# Patient Record
Sex: MALE | Race: WHITE | NOT HISPANIC OR LATINO | Employment: OTHER | URBAN - METROPOLITAN AREA
[De-identification: names, ages, dates, MRNs, and addresses within clinical notes are randomized per-mention and may not be internally consistent; named-entity substitution may affect disease eponyms.]

---

## 2017-01-11 ENCOUNTER — OFFICE VISIT (OUTPATIENT)
Dept: FAMILY MEDICINE | Facility: CLINIC | Age: 67
End: 2017-01-11
Payer: COMMERCIAL

## 2017-01-11 VITALS
BODY MASS INDEX: 33.03 KG/M2 | OXYGEN SATURATION: 95 % | HEIGHT: 73 IN | WEIGHT: 249.2 LBS | SYSTOLIC BLOOD PRESSURE: 128 MMHG | DIASTOLIC BLOOD PRESSURE: 78 MMHG | TEMPERATURE: 97.1 F | HEART RATE: 65 BPM

## 2017-01-11 DIAGNOSIS — I10 BENIGN ESSENTIAL HYPERTENSION: Primary | ICD-10-CM

## 2017-01-11 DIAGNOSIS — G62.9 PERIPHERAL POLYNEUROPATHY: ICD-10-CM

## 2017-01-11 DIAGNOSIS — D17.30 LIPOMA OF SKIN AND SUBCUTANEOUS TISSUE: ICD-10-CM

## 2017-01-11 DIAGNOSIS — M25.531 RIGHT WRIST PAIN: ICD-10-CM

## 2017-01-11 DIAGNOSIS — E78.5 HYPERLIPIDEMIA LDL GOAL <130: ICD-10-CM

## 2017-01-11 DIAGNOSIS — C02.9 SQUAMOUS CELL CARCINOMA OF TONGUE (H): ICD-10-CM

## 2017-01-11 DIAGNOSIS — Z12.5 SCREENING FOR PROSTATE CANCER: ICD-10-CM

## 2017-01-11 LAB
ALBUMIN SERPL-MCNC: 4.3 G/DL (ref 3.4–5)
ALP SERPL-CCNC: 75 U/L (ref 40–150)
ALT SERPL W P-5'-P-CCNC: 53 U/L (ref 0–70)
ANION GAP SERPL CALCULATED.3IONS-SCNC: 5 MMOL/L (ref 3–14)
AST SERPL W P-5'-P-CCNC: 24 U/L (ref 0–45)
BILIRUB SERPL-MCNC: 1.1 MG/DL (ref 0.2–1.3)
BUN SERPL-MCNC: 11 MG/DL (ref 7–30)
CALCIUM SERPL-MCNC: 9.1 MG/DL (ref 8.5–10.1)
CHLORIDE SERPL-SCNC: 105 MMOL/L (ref 94–109)
CHOLEST SERPL-MCNC: 157 MG/DL
CO2 SERPL-SCNC: 28 MMOL/L (ref 20–32)
CREAT SERPL-MCNC: 0.75 MG/DL (ref 0.66–1.25)
ERYTHROCYTE [DISTWIDTH] IN BLOOD BY AUTOMATED COUNT: 13.4 % (ref 10–15)
GFR SERPL CREATININE-BSD FRML MDRD: ABNORMAL ML/MIN/1.7M2
GLUCOSE SERPL-MCNC: 104 MG/DL (ref 70–99)
HCT VFR BLD AUTO: 49.5 % (ref 40–53)
HDLC SERPL-MCNC: 41 MG/DL
HGB BLD-MCNC: 17.3 G/DL (ref 13.3–17.7)
LDLC SERPL CALC-MCNC: 74 MG/DL
MCH RBC QN AUTO: 29.4 PG (ref 26.5–33)
MCHC RBC AUTO-ENTMCNC: 34.9 G/DL (ref 31.5–36.5)
MCV RBC AUTO: 84 FL (ref 78–100)
NONHDLC SERPL-MCNC: 116 MG/DL
PLATELET # BLD AUTO: 219 10E9/L (ref 150–450)
POTASSIUM SERPL-SCNC: 3.8 MMOL/L (ref 3.4–5.3)
PROT SERPL-MCNC: 7 G/DL (ref 6.8–8.8)
PSA SERPL-ACNC: 1.82 UG/L (ref 0–4)
RBC # BLD AUTO: 5.89 10E12/L (ref 4.4–5.9)
SODIUM SERPL-SCNC: 138 MMOL/L (ref 133–144)
TRIGL SERPL-MCNC: 208 MG/DL
WBC # BLD AUTO: 5.6 10E9/L (ref 4–11)

## 2017-01-11 PROCEDURE — 36415 COLL VENOUS BLD VENIPUNCTURE: CPT | Performed by: INTERNAL MEDICINE

## 2017-01-11 PROCEDURE — 85027 COMPLETE CBC AUTOMATED: CPT | Performed by: INTERNAL MEDICINE

## 2017-01-11 PROCEDURE — 80053 COMPREHEN METABOLIC PANEL: CPT | Performed by: INTERNAL MEDICINE

## 2017-01-11 PROCEDURE — 80061 LIPID PANEL: CPT | Performed by: INTERNAL MEDICINE

## 2017-01-11 PROCEDURE — G0103 PSA SCREENING: HCPCS | Performed by: INTERNAL MEDICINE

## 2017-01-11 PROCEDURE — 99203 OFFICE O/P NEW LOW 30 MIN: CPT | Performed by: INTERNAL MEDICINE

## 2017-01-11 RX ORDER — HYDROCHLOROTHIAZIDE 25 MG/1
25 TABLET ORAL PRN
COMMUNITY
End: 2017-02-09

## 2017-01-11 NOTE — MR AVS SNAPSHOT
After Visit Summary   1/11/2017    Chrystal Solis    MRN: [id number]           Patient Information     Date Of Birth          1950        Visit Information        Provider Department      1/11/2017 8:30 AM Lam Rodríguez MD [facility]        Today's Diagnoses     Benign essential hypertension    -  1     Hyperlipidemia LDL goal <130         Squamous cell carcinoma of tongue (H)         Lipoma of skin and subcutaneous tissue         Right wrist pain         Peripheral polyneuropathy (H)         Screening for prostate cancer            Follow-ups after your visit        Additional Services     GENERAL SURG ADULT REFERRAL       Your provider has referred you to: FM[facility]: [facility] [place] [phone]   [email or web address]    Dr. Jaswinder Whitten    Please be aware that coverage of these services is subject to the terms and limitations of your health insurance plan.  Call member services at your health plan with any benefit or coverage questions.      Please bring the following with you to your appointment:    (1) Any X-Rays, CTs or MRIs which have been performed.  Contact the facility where they were done to arrange for  prior to your scheduled appointment.   (2) List of current medications   (3) This referral request   (4) Any documents/labs given to you for this referral            ORTHOPEDICS ADULT REFERRAL       Your provider has referred you to: [facility] [place] [phone]   [email or web address] (Hand specialist)    Please be aware that coverage of these services is subject to the terms and limitations of your health insurance plan.  Call member services at your health plan with any benefit or coverage questions.      Please bring the following to your appointment:    >>   Any x-rays, CTs or MRIs which have been performed.  Contact the facility where they were done to  "arrange for  prior to your scheduled appointment.    >>   List of current medications   >>   This referral request   >>   Any documents/labs given to you for this referral                  Who to contact     If you have questions or need follow up information about today's clinic visit or your schedule please contact [facility] directly at [phone].  Normal or non-critical lab and imaging results will be communicated to you by MyChart, letter or phone within 4 business days after the clinic has received the results. If you do not hear from us within 7 days, please contact the clinic through [employer] or phone. If you have a critical or abnormal lab result, we will notify you by phone as soon as possible.  Submit refill requests through [employer] or call your pharmacy and they will forward the refill request to us. Please allow 3 business days for your refill to be completed.          Additional Information About Your Visit        [employer] Information     [employer] lets you send messages to your doctor, view your test results, renew your prescriptions, schedule appointments and more. To sign up, go to [email or web address] . Click on \"Log in\" on the left side of the screen, which will take you to the Welcome page. Then click on \"Sign up Now\" on the right side of the page.     You will be asked to enter the access code listed below, as well as some personal information. Please follow the directions to create your username and password.     Your access code is: [email or web address]  Expires: 2017  9:51 AM     Your access code will  in 90 days. If you need help or a new code, please call your [place] clinic or [phone].        Care EveryWhere ID     This is your Care EveryWhere ID. This could be used by other organizations to access your [place] medical records  [id number]        Your Vitals Were     Pulse Temperature Height BMI (Body Mass Index) Pulse Oximetry       65 97.1  F (36.2  C) " "(Oral) 6' 0.75\" (1.848 m) 33.10 kg/m2 95%        Blood Pressure from Last 3 Encounters:   01/11/17 128/78   01/31/14 132/79   03/06/13 132/80    Weight from Last 3 Encounters:   01/11/17 249 lb 3.2 oz (113.036 kg)   01/31/14 234 lb 9.6 oz (106.414 kg)   03/06/13 222 lb (100.699 kg)              We Performed the Following     CBC with platelets     Comprehensive metabolic panel     GENERAL SURG ADULT REFERRAL     Lipid Profile with reflex to direct LDL     ORTHOPEDICS ADULT REFERRAL     PSA, screen        Primary Care Provider Office Phone # Fax #    Moshe Thomas [phone]       [facility] INTERNAL [facility] [address] [address]        Thank you!     Thank you for choosing [facility]  for your care. Our goal is always to provide you with excellent care. Hearing back from our patients is one way we can continue to improve our services. Please take a few minutes to complete the written survey that you may receive in the mail after your visit with us. Thank you!             Your Updated Medication List - Protect others around you: Learn how to safely use, store and throw away your medicines at www.disposemymeds.org.          This list is accurate as of: 1/11/17  9:51 AM.  Always use your most recent med list.                   Brand Name Dispense Instructions for use    AVAPRO 150 MG tablet   Generic drug:  irbesartan      Take 300 mg by mouth daily.       hydrochlorothiazide 25 MG tablet    HYDRODIURIL     Take 25 mg by mouth as needed       LIPITOR 10 MG tablet   Generic drug:  atorvastatin      Take 10 mg by mouth daily.       NORVASC 10 MG tablet   Generic drug:  amLODIPine      Take 10 mg by mouth daily.         "

## 2017-01-11 NOTE — PROGRESS NOTES
"  SUBJECTIVE:                                                    Chrystal Solis is a 66 year old male who presents to clinic today for the following health issues:      New Patient/Transfer of Care    Right wrist pain x 2 months    Hyperlipidemia Follow-Up      Rate your low fat/cholesterol diet?: not monitoring fat    Taking statin?  Yes, no muscle aches from statin    Other lipid medications/supplements?:  none     Hypertension Follow-up      Outpatient blood pressures are not being checked.    Low Salt Diet: not monitoring salt       Amount of exercise or physical activity: No exercise outside of work; physical job    Problems taking medications regularly: No    Medication side effects: none  Diet: regular (no restrictions)      Musculoskeletal problem/pain      Duration: 2 years ago fall at work (8/31/2015); since then intermittent pain, severe over last few months    Description  Location: Right wrist     Intensity:  severe    Accompanying signs and symptoms: none    History  Previous similar problem: None   Previous evaluation:  x-ray YES- x-ray in 2015 showed no fracture according to patient    Precipitating or alleviating factors:  Trauma or overuse: YES  Aggravating factors include: some pain at rest, severely exacerbated by griping     Therapies tried and outcome: ice and prednisone (prednisone helped temporarily)      Subcutaneous mass on left shoulder present for years that gets sore when he bumps.  Biopsy showed \"chunk of lard\" \"fatty tissue\" ? Lipoma           Problem list and histories reviewed & adjusted, as indicated.  Additional history: as documented    Patient Active Problem List   Diagnosis     Squamous cell carcinoma of tongue (H)     Benign essential hypertension     Hyperlipidemia LDL goal <130     Peripheral polyneuropathy (H)     Past Surgical History   Procedure Laterality Date     Orthopedic surgery  2002     torn ligament right shoulder     Hernia repair Left 2002     Soft tissue " "surgery  12/6/10     excisional biopsy of tongue lesion     Tonsillectomy  Age 6     Colonoscopy  3/2009     Ent surgery       Head & neck surgery       Biopsy         Social History   Substance Use Topics     Smoking status: Former Smoker -- 0.25 packs/day for 2 years     Types: Cigarettes     Quit date: 01/01/1978     Smokeless tobacco: Never Used      Comment: smoked socially in college     Alcohol Use: 0.0 oz/week     0 Standard drinks or equivalent per week      Comment: 4-5 drinks a week     Family History   Problem Relation Age of Onset     CEREBROVASCULAR DISEASE Mother      Hypertension Mother      Respiratory Father      heavy smoker     CANCER Maternal Grandfather      lip cancer--heavy smoker     Unknown/Adopted Son      Unknown/Adopted Daughter          Current Outpatient Prescriptions   Medication Sig Dispense Refill     hydrochlorothiazide (HYDRODIURIL) 25 MG tablet Take 25 mg by mouth as needed       amLODIPine (NORVASC) 10 MG tablet Take 10 mg by mouth daily.       irbesartan (AVAPRO) 150 MG tablet Take 300 mg by mouth daily.        atorvastatin (LIPITOR) 10 MG tablet Take 10 mg by mouth daily.       Allergies   Allergen Reactions     No Known Allergies        ROS:  Neuropathy numbness in bilateral feet relates to chemo    10 ROS otherwise negative    OBJECTIVE:                                                    /78 mmHg  Pulse 65  Temp(Src) 97.1  F (36.2  C) (Oral)  Ht 6' 0.75\" (1.848 m)  Wt 249 lb 3.2 oz (113.036 kg)  BMI 33.10 kg/m2  SpO2 95%  Body mass index is 33.1 kg/(m^2).   GENERAL: healthy, alert and no distress  EYES: Eyes grossly normal to inspection, PERRL and conjunctivae and sclerae normal  HENT: ear canals and TM's normal, nose and mouth without ulcers or lesions  NECK: fibrotic tissue in neck without masses or adenopathy  RESP: lungs clear to auscultation - no rales, rhonchi or wheezes  CV: regular rate and rhythm, normal S1 S2, no S3 or S4, no murmur, click or rub, no " peripheral edema and peripheral pulses strong  ABDOMEN: soft, nontender, no hepatosplenomegaly, no masses and bowel sounds normal  MS: no gross musculoskeletal defects noted, no edema; pain with palpation of base of right thumb, also pain with flexion extension of wrist  SKIN: no suspicious lesions or rashes; subcutaneous mass on left shoulder, mobile, not tender, 5cm X5cm   NEURO: Normal strength and tone, mentation intact and speech normal  PSYCH: mentation appears normal, affect normal/bright    Diagnostic Test Results:  none      ASSESSMENT:                                                        PLAN:                                                    1. Benign essential hypertension  Second check demonstrates good control   Check labs      2. Hyperlipidemia LDL goal <130  On statin therapy   He wants his lipids checked    3. Squamous cell carcinoma of tongue (H)  Continue follow up with [facility]     4. Lipoma of skin and subcutaneous tissue  He should see Dr. Lopez to discuss removing this large lipoma  - GENERAL SURG ADULT REFERRAL    5. Right wrist pain  Complicated presentation with history of remote trauma, some of his symptoms are likely from base of thumb OA, but wrist symptoms are also concerning, I think he should have a hand surgery consult   - ORTHOPEDICS ADULT REFERRAL    6. Peripheral polyneuropathy (H)  Stable symptoms         FUTURE APPOINTMENTS:       - Follow-up visit in pending labs and symptoms     Lam Rodríguez MD  [facility]      Total time > 28 minutes mostly coordinating care/ counseled on diet and exercise

## 2017-01-11 NOTE — PROGRESS NOTES
"Quick Note:    The following letter pertains to your most recent diagnostic tests:    -Liver and gallbladder tests are normal for you. (ALT,AST, Alk phos, bilirubin), kidney function is normal for you (Creatinine, GFR), Sodium is normal, Potassium is normal for you, Calcium is normal for you, Glucose (blood sugar) is near enough to normal for you.     -Your total cholesterol is 157 which is at your goal of total cholesterol less than 200.    -Your triglycerides are 208 which are above your goal of triglycerides less than 150.    -Your HDL or \"good cholesterol\" is 41 which is at your goal of HDL cholesterol greater than 40.    -Your LDL cholesterol or \"bad cholesterol\" is 74 which is at your goal of LDL cholesterol less than <100. Your LDL goal is based on your risk factors for artery disease.        Bottom line: Your cholesterol could use some attention to your diet. Reducing carbohydrates and fats in your diet, losing weight and consuming less alcohol can improve your triglyceride levels. This can be rechecked in one year. The rest of your labs look OK.       Follow up: Schedule an appointment for a physical examination with fasting blood tests in one year's time, or return sooner if new questions, symptoms or problems arise.        Sincerely,    Dr. Rodríguez  ______  "

## 2017-01-11 NOTE — NURSING NOTE
"Chief Complaint   Patient presents with     Establish Care       Initial /91 mmHg  Pulse 65  Temp(Src) 97.1  F (36.2  C) (Oral)  Ht 6' 0.75\" (1.848 m)  Wt 249 lb 3.2 oz (113.036 kg)  BMI 33.10 kg/m2  SpO2 95% Estimated body mass index is 33.1 kg/(m^2) as calculated from the following:    Height as of this encounter: 6' 0.75\" (1.848 m).    Weight as of this encounter: 249 lb 3.2 oz (113.036 kg).  BP completed using cuff size: large, right arm  Nuvia Sunshine MA  "

## 2017-01-11 NOTE — Clinical Note
"[facility]  [address]  [place] [address]  [phone]                                                                                               Date: 1/12/2017    Chrystal RENEE Will                                                                               [address] [place] [address]              The following letter pertains to your most recent diagnostic tests:    -Liver and gallbladder tests are normal for you. (ALT,AST, Alk phos, bilirubin), kidney function is normal for you (Creatinine, GFR), Sodium is normal, Potassium is normal for you, Calcium is normal for you, Glucose (blood sugar) is near enough to normal for you.      -Your total cholesterol is 157 which is at your goal of total cholesterol less than 200.    -Your triglycerides are 208 which are above your goal of triglycerides less than 150.    -Your HDL or \"good cholesterol\" is 41 which is at your goal of HDL cholesterol greater than 40.    -Your LDL cholesterol or \"bad cholesterol\" is 74 which is at your goal of LDL cholesterol less than <100.  Your LDL goal is based on your risk factors for artery disease.         Bottom line:  Your cholesterol could use some attention to your diet.  Reducing carbohydrates and fats in your diet, losing weight and consuming less alcohol can improve your triglyceride levels. This can be rechecked in one year.  The rest of your labs look OK.        Follow up:  Schedule an appointment for a physical examination with fasting blood tests in one year's time, or return sooner if new questions, symptoms or problems arise.         Sincerely,    Dr. Rodríguez/Melia VAZQUEZ CMA    Results for orders placed or performed in visit on 01/11/17   Comprehensive metabolic panel   Result Value Ref Range    Sodium 138 133 - 144 mmol/L    Potassium 3.8 3.4 - 5.3 mmol/L    Chloride 105 94 - 109 mmol/L    Carbon Dioxide 28 20 - 32 mmol/L    Anion Gap 5 3 - 14 mmol/L    Glucose 104 (H) 70 - 99 " mg/dL    Urea Nitrogen 11 7 - 30 mg/dL    Creatinine 0.75 0.66 - 1.25 mg/dL    GFR Estimate >90  Non  GFR Calc   >60 mL/min/1.7m2    GFR Estimate If Black >90   GFR Calc   >60 mL/min/1.7m2    Calcium 9.1 8.5 - 10.1 mg/dL    Bilirubin Total 1.1 0.2 - 1.3 mg/dL    Albumin 4.3 3.4 - 5.0 g/dL    Protein Total 7.0 6.8 - 8.8 g/dL    Alkaline Phosphatase 75 40 - 150 U/L    ALT 53 0 - 70 U/L    AST 24 0 - 45 U/L   Lipid Profile with reflex to direct LDL   Result Value Ref Range    Cholesterol 157 <200 mg/dL    Triglycerides 208 (H) <150 mg/dL    HDL Cholesterol 41 >39 mg/dL    LDL Cholesterol Calculated 74 <100 mg/dL    Non HDL Cholesterol 116 <130 mg/dL   CBC with platelets   Result Value Ref Range    WBC 5.6 4.0 - 11.0 10e9/L    RBC Count 5.89 4.4 - 5.9 10e12/L    Hemoglobin 17.3 13.3 - 17.7 g/dL    Hematocrit 49.5 40.0 - 53.0 %    MCV 84 78 - 100 fl    MCH 29.4 26.5 - 33.0 pg    MCHC 34.9 31.5 - 36.5 g/dL    RDW 13.4 10.0 - 15.0 %    Platelet Count 219 150 - 450 10e9/L   PSA, screen   Result Value Ref Range    PSA 1.82 0 - 4 ug/L             Sincerely,    {[facility] PROVIDER LIST:[id number]}

## 2017-01-24 ENCOUNTER — OFFICE VISIT (OUTPATIENT)
Dept: SURGERY | Facility: CLINIC | Age: 67
End: 2017-01-24
Payer: COMMERCIAL

## 2017-01-24 VITALS
DIASTOLIC BLOOD PRESSURE: 84 MMHG | HEART RATE: 77 BPM | BODY MASS INDEX: 33.18 KG/M2 | WEIGHT: 245 LBS | HEIGHT: 72 IN | SYSTOLIC BLOOD PRESSURE: 140 MMHG

## 2017-01-24 DIAGNOSIS — D17.30 LIPOMA OF SKIN AND SUBCUTANEOUS TISSUE: Primary | ICD-10-CM

## 2017-01-24 PROCEDURE — 99243 OFF/OP CNSLTJ NEW/EST LOW 30: CPT | Performed by: SURGERY

## 2017-01-24 NOTE — Clinical Note
Sur[facility], [facility]    January 24, 2017         Jaswinder Lopez MD    Chrystal Solis MRN# [id number]   YOB: 1950 Age: 66 year old     PCP:  Lam Rodríguez [phone]    Chief Complaint:  Subcutaneous mass on the back and left arm    Pt was seen in consultation from Lam Rodríguez.    History of Present Illness:  Chrystal Solis is a 66 year old male who presented with the prominent subcutaneous mass on the arm. This was just below the deltoid and measures approximately 5-6 cm in greatest diameter. This is occasionally tender when he strikes that he feels is getting larger. Patient also has a mass on the right shoulder below the scapula. He is interested in surgical removal.    PMH:  Chrystal Solis  has a past medical history of Hypertension; Bronchitis; Malignant neoplasm (H) (12/6/10); Hearing loss, mixed, bilateral; Intervertebral cervical disc disorder with myelopathy, cervical region (2006); Benign essential hypertension (1/11/2017); Squamous cell carcinoma of tongue (H) (12/14/2010); Hyperlipidemia LDL goal <130 (1/11/2017); and Peripheral polyneuropathy (H) (1/11/2017).  PSH:  Chrystal Solis  has past surgical history that includes orthopedic surgery (2002); hernia repair (Left, 2002); Soft tissue surgery (12/6/10); tonsillectomy (Age 6); colonoscopy (3/2009); ENT surgery; Head and neck surgery; and biopsy.    Home medications and allergies reviewed.    Social History:  Chrystal Solis  reports that he quit smoking about 39 years ago. His smoking use included Cigarettes. He has a .5 pack-year smoking history. He has never used smokeless tobacco. He reports that he drinks alcohol. He reports that he does not use illicit drugs.  Family History:  Chrystal Solis family history includes CANCER in his maternal grandfather; CEREBROVASCULAR DISEASE in his mother; Hypertension in his mother; Respiratory in his father; Unknown/Adopted in his daughter and  son.    ROS:  The 10 point Review of Systems is negative other than noted in the HPI.    Physical Exam:  Blood pressure 140/84, pulse 77, height 6' (1.829 m), weight 245 lb (111.131 kg).  245 lbs 0 oz  Healthy-appearing gentleman in no distress.  Patient has a pleasant affect and communicates well.   Pupils equal round and reactive to light.   No cervical lymphadenopathy or thyromegaly.   Lung fields clear, breathing comfortably.   Heart normal sinus rhythm.  No murmurs rubs or gallops.  Soft somewhat firm fleshy lesion below the deltoid on the left. Not particularly mobile. Also a 4-5 cm area of fullness along the right scapula.  Skin warm, dry.  No obvious rashes or lesions.     Assessment/plan:  Pleasant 66-year-old gentleman with two prominent lipomas. He seems very thick skinned and these lipomas on the back are challenging to remove. I recommended surgical excision of both of these lesions under either heavy sedation or general anesthesia. This will insure that we remove the entire lesion and make for a less painful surgery. He was interested in getting this taken care of sometime in the near future.    Surgical co-morbities include hypertension, hypercholesterolemia, previous malignancy.    Naveen Lopez M.D.  [facility]  [phone]

## 2017-01-24 NOTE — MR AVS SNAPSHOT
"              After Visit Summary   1/24/2017    Chrystal Solis    MRN: [id number]           Patient Information     Date Of Birth          1950        Visit Information        Provider Department      1/24/2017 3:00 PM Jaswinder Lopez MD [facility] General Surgery       Follow-ups after your visit        Your next 10 appointments already scheduled     Feb 09, 2017  9:00 AM   PHYSICAL with Lam Rodrí[facility]uez MD   [facility]    [address] [facility] [address] [phone]              Who to contact     If you have questions or need follow up information about today's clinic visit or your schedule please contact [facility] directly at [phone].  Normal or non-critical lab and imaging results will be communicated to you by MyChart, letter or phone within 4 business days after the clinic has received the results. If you do not hear from us within 7 days, please contact the clinic through MyChart or phone. If you have a critical or abnormal lab result, we will notify you by phone as soon as possible.  Submit refill requests through [employer] or call your pharmacy and they will forward the refill request to us. Please allow 3 business days for your refill to be completed.          Additional Information About Your Visit        MyChart Information     [employer] lets you send messages to your doctor, view your test results, renew your prescriptions, schedule appointments and more. To sign up, go to [email or web address] . Click on \"Log in\" on the left side of the screen, which will take you to the Welcome page. Then click on \"Sign up Now\" on the right side of the page.     You will be asked to enter the access code listed below, as well as some personal information. Please follow the directions to create your username and password.     Your access code is: [email or web address]  Expires: 4/11/2017  9:51 " AM     Your access code will  in 90 days. If you need help or a new code, please call your [place] clinic or [phone].        Care EveryWhere ID     This is your Care EveryWhere ID. This could be used by other organizations to access your [place] medical records  [id number]        Your Vitals Were     Pulse Height BMI (Body Mass Index)             77 6' (1.829 m) 33.22 kg/m2          Blood Pressure from Last 3 Encounters:   17 166/99   17 128/78   14 132/79    Weight from Last 3 Encounters:   17 245 lb (111.131 kg)   17 249 lb 3.2 oz (113.036 kg)   14 234 lb 9.6 oz (106.414 kg)              Today, you had the following     No orders found for display       Primary Care Provider Office Phone # Fax #    Lam Rodríguez [phone]       [facility] [address] [address]        Thank you!     Thank you for choosing [facility]  for your care. Our goal is always to provide you with excellent care. Hearing back from our patients is one way we can continue to improve our services. Please take a few minutes to complete the written survey that you may receive in the mail after your visit with us. Thank you!             Your Updated Medication List - Protect others around you: Learn how to safely use, store and throw away your medicines at www.disposemymeds.org.          This list is accurate as of: 17  3:12 PM.  Always use your most recent med list.                   Brand Name Dispense Instructions for use    AVAPRO 150 MG tablet   Generic drug:  irbesartan      Take 300 mg by mouth daily.       hydrochlorothiazide 25 MG tablet    HYDRODIURIL     Take 25 mg by mouth as needed       LIPITOR 10 MG tablet   Generic drug:  atorvastatin      Take 10 mg by mouth daily.       NORVASC 10 MG tablet   Generic drug:  amLODIPine      Take 10 mg by mouth daily.

## 2017-01-25 NOTE — PROGRESS NOTES
Surgery Consultation, [facility], KRISHAN Lopez MD    Chrystal Solis MRN# [id number]   YOB: 1950 Age: 66 year old     PCP:  Lam Rodríguez [phone]    Chief Complaint:  Subcutaneous mass on the back and left arm    Pt was seen in consultation from Lam Rodríguez.    History of Present Illness:  Chrystal Solis is a 66 year old male who presented with the prominent subcutaneous mass on the arm. This was just below the deltoid and measures approximately 5-6 cm in greatest diameter. This is occasionally tender when he strikes that he feels is getting larger. Patient also has a mass on the right shoulder below the scapula. He is interested in surgical removal.    PMH:  Chrystal Solis  has a past medical history of Hypertension; Bronchitis; Malignant neoplasm (H) (12/6/10); Hearing loss, mixed, bilateral; Intervertebral cervical disc disorder with myelopathy, cervical region (2006); Benign essential hypertension (1/11/2017); Squamous cell carcinoma of tongue (H) (12/14/2010); Hyperlipidemia LDL goal <130 (1/11/2017); and Peripheral polyneuropathy (H) (1/11/2017).  PSH:  Chrystal Solis  has past surgical history that includes orthopedic surgery (2002); hernia repair (Left, 2002); Soft tissue surgery (12/6/10); tonsillectomy (Age 6); colonoscopy (3/2009); ENT surgery; Head and neck surgery; and biopsy.    Home medications and allergies reviewed.    Social History:  Chrystal oSlis  reports that he quit smoking about 39 years ago. His smoking use included Cigarettes. He has a .5 pack-year smoking history. He has never used smokeless tobacco. He reports that he drinks alcohol. He reports that he does not use illicit drugs.  Family History:  Chrystal Solis family history includes CANCER in his maternal grandfather; CEREBROVASCULAR DISEASE in his mother; Hypertension in his mother; Respiratory in his father; Unknown/Adopted in his daughter and son.    ROS:  The 10  point Review of Systems is negative other than noted in the HPI.    Physical Exam:  Blood pressure 140/84, pulse 77, height 6' (1.829 m), weight 245 lb (111.131 kg).  245 lbs 0 oz  Healthy-appearing gentleman in no distress.  Patient has a pleasant affect and communicates well.   Pupils equal round and reactive to light.   No cervical lymphadenopathy or thyromegaly.   Lung fields clear, breathing comfortably.   Heart normal sinus rhythm.  No murmurs rubs or gallops.  Soft somewhat firm fleshy lesion below the deltoid on the left. Not particularly mobile. Also a 4-5 cm area of fullness along the right scapula.  Skin warm, dry.  No obvious rashes or lesions.     Assessment/plan:  Pleasant 66-year-old gentleman with two prominent lipomas. He seems very thick skinned and these lipomas on the back are challenging to remove. I recommended surgical excision of both of these lesions under either heavy sedation or general anesthesia. This will insure that we remove the entire lesion and make for a less painful surgery. He was interested in getting this taken care of sometime in the near future.    Surgical co-morbities include hypertension, hypercholesterolemia, previous malignancy.    Naveen Lopez M.D.  [facility]  [phone]    Please route or send letter to:  Primary Care Provider (PCP) and Referring Provider    [facility]      ROS      Physical Exam

## 2017-01-27 ENCOUNTER — TELEPHONE (OUTPATIENT)
Dept: FAMILY MEDICINE | Facility: CLINIC | Age: 67
End: 2017-01-27

## 2017-01-27 NOTE — TELEPHONE ENCOUNTER
Patient states that he spoke with Estela [phone] she advised him that he does not need a Pre op for this minor surgery.     Marybeth Jackson MA

## 2017-01-27 NOTE — TELEPHONE ENCOUNTER
Reason for Call:  Other appointment    Detailed comments: Pt is scheduled for procedure on 2/3 is confused  If he needs a pre op since he was just seen on 1/11. Pt has   Been trying to get ah old of surgical scheduling and has been unsuccessful   Please call Pt back to let him know if Pre op is needed.    Phone Number Patient can be reached at: Home number on file [phone] (home)    Best Time: anytime    Can we leave a detailed message on this number? YES    Call taken on 1/27/2017 at 8:16 AM by Esha Catherine

## 2017-02-01 NOTE — H&P (VIEW-ONLY)
"  SUBJECTIVE:                                                    Chrystal Solis is a 66 year old male who presents to clinic today for the following health issues:      New Patient/Transfer of Care    Right wrist pain x 2 months    Hyperlipidemia Follow-Up      Rate your low fat/cholesterol diet?: not monitoring fat    Taking statin?  Yes, no muscle aches from statin    Other lipid medications/supplements?:  none     Hypertension Follow-up      Outpatient blood pressures are not being checked.    Low Salt Diet: not monitoring salt       Amount of exercise or physical activity: No exercise outside of work; physical job    Problems taking medications regularly: No    Medication side effects: none  Diet: regular (no restrictions)      Musculoskeletal problem/pain      Duration: 2 years ago fall at work (8/31/2015); since then intermittent pain, severe over last few months    Description  Location: Right wrist     Intensity:  severe    Accompanying signs and symptoms: none    History  Previous similar problem: None   Previous evaluation:  x-ray YES- x-ray in 2015 showed no fracture according to patient    Precipitating or alleviating factors:  Trauma or overuse: YES  Aggravating factors include: some pain at rest, severely exacerbated by griping     Therapies tried and outcome: ice and prednisone (prednisone helped temporarily)      Subcutaneous mass on left shoulder present for years that gets sore when he bumps.  Biopsy showed \"chunk of lard\" \"fatty tissue\" ? Lipoma           Problem list and histories reviewed & adjusted, as indicated.  Additional history: as documented    Patient Active Problem List   Diagnosis     Squamous cell carcinoma of tongue (H)     Benign essential hypertension     Hyperlipidemia LDL goal <130     Peripheral polyneuropathy (H)     Past Surgical History   Procedure Laterality Date     Orthopedic surgery  2002     torn ligament right shoulder     Hernia repair Left 2002     Soft tissue " "surgery  12/6/10     excisional biopsy of tongue lesion     Tonsillectomy  Age 6     Colonoscopy  3/2009     Ent surgery       Head & neck surgery       Biopsy         Social History   Substance Use Topics     Smoking status: Former Smoker -- 0.25 packs/day for 2 years     Types: Cigarettes     Quit date: 01/01/1978     Smokeless tobacco: Never Used      Comment: smoked socially in college     Alcohol Use: 0.0 oz/week     0 Standard drinks or equivalent per week      Comment: 4-5 drinks a week     Family History   Problem Relation Age of Onset     CEREBROVASCULAR DISEASE Mother      Hypertension Mother      Respiratory Father      heavy smoker     CANCER Maternal Grandfather      lip cancer--heavy smoker     Unknown/Adopted Son      Unknown/Adopted Daughter          Current Outpatient Prescriptions   Medication Sig Dispense Refill     hydrochlorothiazide (HYDRODIURIL) 25 MG tablet Take 25 mg by mouth as needed       amLODIPine (NORVASC) 10 MG tablet Take 10 mg by mouth daily.       irbesartan (AVAPRO) 150 MG tablet Take 300 mg by mouth daily.        atorvastatin (LIPITOR) 10 MG tablet Take 10 mg by mouth daily.       Allergies   Allergen Reactions     No Known Allergies        ROS:  Neuropathy numbness in bilateral feet relates to chemo    10 ROS otherwise negative    OBJECTIVE:                                                    /78 mmHg  Pulse 65  Temp(Src) 97.1  F (36.2  C) (Oral)  Ht 6' 0.75\" (1.848 m)  Wt 249 lb 3.2 oz (113.036 kg)  BMI 33.10 kg/m2  SpO2 95%  Body mass index is 33.1 kg/(m^2).   GENERAL: healthy, alert and no distress  EYES: Eyes grossly normal to inspection, PERRL and conjunctivae and sclerae normal  HENT: ear canals and TM's normal, nose and mouth without ulcers or lesions  NECK: fibrotic tissue in neck without masses or adenopathy  RESP: lungs clear to auscultation - no rales, rhonchi or wheezes  CV: regular rate and rhythm, normal S1 S2, no S3 or S4, no murmur, click or rub, no " peripheral edema and peripheral pulses strong  ABDOMEN: soft, nontender, no hepatosplenomegaly, no masses and bowel sounds normal  MS: no gross musculoskeletal defects noted, no edema; pain with palpation of base of right thumb, also pain with flexion extension of wrist  SKIN: no suspicious lesions or rashes; subcutaneous mass on left shoulder, mobile, not tender, 5cm X5cm   NEURO: Normal strength and tone, mentation intact and speech normal  PSYCH: mentation appears normal, affect normal/bright    Diagnostic Test Results:  none      ASSESSMENT:                                                        PLAN:                                                    1. Benign essential hypertension  Second check demonstrates good control   Check labs      2. Hyperlipidemia LDL goal <130  On statin therapy   He wants his lipids checked    3. Squamous cell carcinoma of tongue (H)  Continue follow up with [facility]     4. Lipoma of skin and subcutaneous tissue  He should see Dr. Lopez to discuss removing this large lipoma  - GENERAL SURG ADULT REFERRAL    5. Right wrist pain  Complicated presentation with history of remote trauma, some of his symptoms are likely from base of thumb OA, but wrist symptoms are also concerning, I think he should have a hand surgery consult   - ORTHOPEDICS ADULT REFERRAL    6. Peripheral polyneuropathy (H)  Stable symptoms         FUTURE APPOINTMENTS:       - Follow-up visit in pending labs and symptoms     Lam Rodríguez MD  [facility]      Total time > 28 minutes mostly coordinating care/ counseled on diet and exercise

## 2017-02-03 ENCOUNTER — APPOINTMENT (OUTPATIENT)
Dept: SURGERY | Facility: PHYSICIAN GROUP | Age: 67
End: 2017-02-03
Payer: COMMERCIAL

## 2017-02-03 ENCOUNTER — HOSPITAL ENCOUNTER (OUTPATIENT)
Facility: CLINIC | Age: 67
Discharge: HOME | End: 2017-02-03
Attending: SURGERY | Admitting: SURGERY
Payer: COMMERCIAL

## 2017-02-03 ENCOUNTER — SURGERY (OUTPATIENT)
Age: 67
End: 2017-02-03

## 2017-02-03 ENCOUNTER — ANESTHESIA EVENT (OUTPATIENT)
Dept: SURGERY | Facility: CLINIC | Age: 67
End: 2017-02-03
Payer: COMMERCIAL

## 2017-02-03 ENCOUNTER — ANESTHESIA (OUTPATIENT)
Dept: SURGERY | Facility: CLINIC | Age: 67
End: 2017-02-03
Payer: COMMERCIAL

## 2017-02-03 VITALS
SYSTOLIC BLOOD PRESSURE: 123 MMHG | RESPIRATION RATE: 16 BRPM | BODY MASS INDEX: 33.38 KG/M2 | HEIGHT: 72 IN | TEMPERATURE: 97.4 F | OXYGEN SATURATION: 95 % | WEIGHT: 246.4 LBS | DIASTOLIC BLOOD PRESSURE: 81 MMHG

## 2017-02-03 DIAGNOSIS — D17.30 LIPOMA OF SKIN AND SUBCUTANEOUS TISSUE: Primary | ICD-10-CM

## 2017-02-03 PROCEDURE — 21933 EXC BACK TUM DEEP 5 CM/>: CPT | Performed by: SURGERY

## 2017-02-03 PROCEDURE — 36000052 ZZH SURGERY LEVEL 2 EA 15 ADDTL MIN: Performed by: SURGERY

## 2017-02-03 PROCEDURE — 36000050 ZZH SURGERY LEVEL 2 1ST 30 MIN: Performed by: SURGERY

## 2017-02-03 PROCEDURE — 25000125 ZZHC RX 250: Performed by: NURSE ANESTHETIST, CERTIFIED REGISTERED

## 2017-02-03 PROCEDURE — 25000125 ZZHC RX 250: Performed by: SURGERY

## 2017-02-03 PROCEDURE — 25000128 H RX IP 250 OP 636: Performed by: SURGERY

## 2017-02-03 PROCEDURE — 40000170 ZZH STATISTIC PRE-PROCEDURE ASSESSMENT II: Performed by: SURGERY

## 2017-02-03 PROCEDURE — 27210794 ZZH OR GENERAL SUPPLY STERILE: Performed by: SURGERY

## 2017-02-03 PROCEDURE — 25000128 H RX IP 250 OP 636: Performed by: NURSE ANESTHETIST, CERTIFIED REGISTERED

## 2017-02-03 PROCEDURE — 71000027 ZZH RECOVERY PHASE 2 EACH 15 MINS: Performed by: SURGERY

## 2017-02-03 PROCEDURE — 37000008 ZZH ANESTHESIA TECHNICAL FEE, 1ST 30 MIN: Performed by: SURGERY

## 2017-02-03 PROCEDURE — 27210995 ZZH RX 272: Performed by: SURGERY

## 2017-02-03 PROCEDURE — 88304 TISSUE EXAM BY PATHOLOGIST: CPT | Mod: 26,59 | Performed by: SURGERY

## 2017-02-03 PROCEDURE — 24071 EXC ARM/ELBOW LES SC 3 CM/>: CPT | Performed by: SURGERY

## 2017-02-03 PROCEDURE — 88304 TISSUE EXAM BY PATHOLOGIST: CPT | Performed by: SURGERY

## 2017-02-03 PROCEDURE — 71000012 ZZH RECOVERY PHASE 1 LEVEL 1 FIRST HR: Performed by: SURGERY

## 2017-02-03 PROCEDURE — 25800025 ZZH RX 258: Performed by: NURSE ANESTHETIST, CERTIFIED REGISTERED

## 2017-02-03 PROCEDURE — 25000132 ZZH RX MED GY IP 250 OP 250 PS 637: Performed by: SURGERY

## 2017-02-03 PROCEDURE — 37000009 ZZH ANESTHESIA TECHNICAL FEE, EACH ADDTL 15 MIN: Performed by: SURGERY

## 2017-02-03 RX ORDER — HYDROMORPHONE HYDROCHLORIDE 1 MG/ML
.3-.5 INJECTION, SOLUTION INTRAMUSCULAR; INTRAVENOUS; SUBCUTANEOUS EVERY 10 MIN PRN
Status: DISCONTINUED | OUTPATIENT
Start: 2017-02-03 | End: 2017-02-03 | Stop reason: HOSPADM

## 2017-02-03 RX ORDER — MEPERIDINE HYDROCHLORIDE 25 MG/ML
12.5 INJECTION INTRAMUSCULAR; INTRAVENOUS; SUBCUTANEOUS
Status: DISCONTINUED | OUTPATIENT
Start: 2017-02-03 | End: 2017-02-03 | Stop reason: HOSPADM

## 2017-02-03 RX ORDER — FENTANYL CITRATE 50 UG/ML
INJECTION, SOLUTION INTRAMUSCULAR; INTRAVENOUS PRN
Status: DISCONTINUED | OUTPATIENT
Start: 2017-02-03 | End: 2017-02-03

## 2017-02-03 RX ORDER — ONDANSETRON 2 MG/ML
INJECTION INTRAMUSCULAR; INTRAVENOUS PRN
Status: DISCONTINUED | OUTPATIENT
Start: 2017-02-03 | End: 2017-02-03

## 2017-02-03 RX ORDER — MAGNESIUM HYDROXIDE 1200 MG/15ML
LIQUID ORAL PRN
Status: DISCONTINUED | OUTPATIENT
Start: 2017-02-03 | End: 2017-02-03 | Stop reason: HOSPADM

## 2017-02-03 RX ORDER — SODIUM CHLORIDE, SODIUM LACTATE, POTASSIUM CHLORIDE, CALCIUM CHLORIDE 600; 310; 30; 20 MG/100ML; MG/100ML; MG/100ML; MG/100ML
INJECTION, SOLUTION INTRAVENOUS CONTINUOUS PRN
Status: DISCONTINUED | OUTPATIENT
Start: 2017-02-03 | End: 2017-02-03

## 2017-02-03 RX ORDER — FENTANYL CITRATE 50 UG/ML
25-50 INJECTION, SOLUTION INTRAMUSCULAR; INTRAVENOUS
Status: DISCONTINUED | OUTPATIENT
Start: 2017-02-03 | End: 2017-02-03 | Stop reason: HOSPADM

## 2017-02-03 RX ORDER — ONDANSETRON 4 MG/1
4 TABLET, ORALLY DISINTEGRATING ORAL EVERY 30 MIN PRN
Status: DISCONTINUED | OUTPATIENT
Start: 2017-02-03 | End: 2017-02-03 | Stop reason: HOSPADM

## 2017-02-03 RX ORDER — GINSENG 100 MG
CAPSULE ORAL PRN
Status: DISCONTINUED | OUTPATIENT
Start: 2017-02-03 | End: 2017-02-03 | Stop reason: HOSPADM

## 2017-02-03 RX ORDER — METOCLOPRAMIDE HYDROCHLORIDE 5 MG/ML
5 INJECTION INTRAMUSCULAR; INTRAVENOUS EVERY 6 HOURS PRN
Status: DISCONTINUED | OUTPATIENT
Start: 2017-02-03 | End: 2017-02-03 | Stop reason: HOSPADM

## 2017-02-03 RX ORDER — CEFAZOLIN SODIUM 1 G/3ML
1 INJECTION, POWDER, FOR SOLUTION INTRAMUSCULAR; INTRAVENOUS SEE ADMIN INSTRUCTIONS
Status: DISCONTINUED | OUTPATIENT
Start: 2017-02-03 | End: 2017-02-03 | Stop reason: HOSPADM

## 2017-02-03 RX ORDER — HYDROCODONE BITARTRATE AND ACETAMINOPHEN 5; 325 MG/1; MG/1
1-2 TABLET ORAL
Status: DISCONTINUED | OUTPATIENT
Start: 2017-02-03 | End: 2017-02-03 | Stop reason: HOSPADM

## 2017-02-03 RX ORDER — LIDOCAINE HYDROCHLORIDE 10 MG/ML
INJECTION, SOLUTION INFILTRATION; PERINEURAL
Status: DISCONTINUED
Start: 2017-02-03 | End: 2017-02-03 | Stop reason: HOSPADM

## 2017-02-03 RX ORDER — SODIUM CHLORIDE, SODIUM LACTATE, POTASSIUM CHLORIDE, CALCIUM CHLORIDE 600; 310; 30; 20 MG/100ML; MG/100ML; MG/100ML; MG/100ML
1000 INJECTION, SOLUTION INTRAVENOUS CONTINUOUS
Status: DISCONTINUED | OUTPATIENT
Start: 2017-02-03 | End: 2017-02-03 | Stop reason: HOSPADM

## 2017-02-03 RX ORDER — ONDANSETRON 2 MG/ML
4 INJECTION INTRAMUSCULAR; INTRAVENOUS EVERY 30 MIN PRN
Status: DISCONTINUED | OUTPATIENT
Start: 2017-02-03 | End: 2017-02-03 | Stop reason: HOSPADM

## 2017-02-03 RX ORDER — METOCLOPRAMIDE 5 MG/1
5 TABLET ORAL EVERY 6 HOURS PRN
Status: DISCONTINUED | OUTPATIENT
Start: 2017-02-03 | End: 2017-02-03 | Stop reason: HOSPADM

## 2017-02-03 RX ORDER — FENTANYL CITRATE 50 UG/ML
25-50 INJECTION, SOLUTION INTRAMUSCULAR; INTRAVENOUS EVERY 5 MIN PRN
Status: DISCONTINUED | OUTPATIENT
Start: 2017-02-03 | End: 2017-02-03 | Stop reason: HOSPADM

## 2017-02-03 RX ORDER — NALOXONE HYDROCHLORIDE 0.4 MG/ML
.1-.4 INJECTION, SOLUTION INTRAMUSCULAR; INTRAVENOUS; SUBCUTANEOUS
Status: DISCONTINUED | OUTPATIENT
Start: 2017-02-03 | End: 2017-02-03 | Stop reason: HOSPADM

## 2017-02-03 RX ORDER — BUPIVACAINE HYDROCHLORIDE AND EPINEPHRINE 5; 5 MG/ML; UG/ML
INJECTION, SOLUTION EPIDURAL; INTRACAUDAL; PERINEURAL
Status: DISCONTINUED
Start: 2017-02-03 | End: 2017-02-03 | Stop reason: HOSPADM

## 2017-02-03 RX ORDER — HYDROCODONE BITARTRATE AND ACETAMINOPHEN 5; 325 MG/1; MG/1
1-2 TABLET ORAL EVERY 4 HOURS PRN
Qty: 20 TABLET | Refills: 0 | Status: SHIPPED | OUTPATIENT
Start: 2017-02-03 | End: 2017-02-09

## 2017-02-03 RX ORDER — CEFAZOLIN SODIUM 2 G/100ML
2 INJECTION, SOLUTION INTRAVENOUS
Status: COMPLETED | OUTPATIENT
Start: 2017-02-03 | End: 2017-02-03

## 2017-02-03 RX ORDER — PROPOFOL 10 MG/ML
INJECTION, EMULSION INTRAVENOUS CONTINUOUS PRN
Status: DISCONTINUED | OUTPATIENT
Start: 2017-02-03 | End: 2017-02-03

## 2017-02-03 RX ORDER — PHYSOSTIGMINE SALICYLATE 1 MG/ML
1-2 INJECTION INTRAVENOUS
Status: DISCONTINUED | OUTPATIENT
Start: 2017-02-03 | End: 2017-02-03 | Stop reason: HOSPADM

## 2017-02-03 RX ADMIN — MIDAZOLAM HYDROCHLORIDE 4 MG: 1 INJECTION, SOLUTION INTRAMUSCULAR; INTRAVENOUS at 10:23

## 2017-02-03 RX ADMIN — FENTANYL CITRATE 25 MCG: 50 INJECTION, SOLUTION INTRAMUSCULAR; INTRAVENOUS at 10:40

## 2017-02-03 RX ADMIN — MIDAZOLAM HYDROCHLORIDE 1 MG: 1 INJECTION, SOLUTION INTRAMUSCULAR; INTRAVENOUS at 10:57

## 2017-02-03 RX ADMIN — FENTANYL CITRATE 50 MCG: 50 INJECTION, SOLUTION INTRAMUSCULAR; INTRAVENOUS at 10:23

## 2017-02-03 RX ADMIN — SODIUM CHLORIDE 1000 ML: 0.9 IRRIGANT IRRIGATION at 10:42

## 2017-02-03 RX ADMIN — PROPOFOL 50 MCG/KG/MIN: 10 INJECTION, EMULSION INTRAVENOUS at 10:23

## 2017-02-03 RX ADMIN — ONDANSETRON 4 MG: 2 INJECTION INTRAMUSCULAR; INTRAVENOUS at 11:06

## 2017-02-03 RX ADMIN — SODIUM CHLORIDE, POTASSIUM CHLORIDE, SODIUM LACTATE AND CALCIUM CHLORIDE: 600; 310; 30; 20 INJECTION, SOLUTION INTRAVENOUS at 10:17

## 2017-02-03 RX ADMIN — FENTANYL CITRATE 25 MCG: 50 INJECTION, SOLUTION INTRAMUSCULAR; INTRAVENOUS at 10:51

## 2017-02-03 RX ADMIN — LIDOCAINE HYDROCHLORIDE 15 ML: 10 INJECTION, SOLUTION EPIDURAL; INFILTRATION; INTRACAUDAL; PERINEURAL at 10:40

## 2017-02-03 RX ADMIN — BACITRACIN 5 G: 500 OINTMENT TOPICAL at 10:55

## 2017-02-03 RX ADMIN — CEFAZOLIN SODIUM 2 G: 2 INJECTION, SOLUTION INTRAVENOUS at 10:25

## 2017-02-03 RX ADMIN — LIDOCAINE HYDROCHLORIDE 19 ML: 10 INJECTION, SOLUTION EPIDURAL; INFILTRATION; INTRACAUDAL; PERINEURAL at 11:11

## 2017-02-03 RX ADMIN — LIDOCAINE HYDROCHLORIDE 10 ML: 10 INJECTION, SOLUTION EPIDURAL; INFILTRATION; INTRACAUDAL; PERINEURAL at 10:41

## 2017-02-03 ASSESSMENT — LIFESTYLE VARIABLES: TOBACCO_USE: 1

## 2017-02-03 NOTE — OP NOTE
General Surgery Operative Note    PREOPERATIVE DIAGNOSIS:  BACK LIPOMA ; LEFT ARM LIPOMA     POSTOPERATIVE DIAGNOSIS:  Upper right back and upper left arm lipoma    PROCEDURE:   Procedure(s):  EXCISE LESION TRUNK  EXCISE LESION UPPER EXTREMITY    ANESTHESIA:  MAC and local    PREOPERATIVE MEDICATIONS:  Ancef    SURGEON:  Jaswinder Lopez MD    ASSISTANT:  Keesha Hare PA-C.  Assistant was required owing to challenging exposure and need for retraction.    INDICATIONS:  Painful slowly enlarging soft tissue lesions    PROCEDURE:  The patient was taken to the operating suite and was given IV sedation. He was placed prone with the left arm out.  The area over the palpable masses was anesthetized with local.  We began with a transverse incision over the left arm.  An obvious fatty mass was encountered superficial to the muscle of the upper arm. The mass was taken out in its entirety. Wound was closed in layers using absorbable suture and nylon.  We then turned our attention toward the right back lesion. We incised th skin and subcutaneous tissue of the back toward the mass.  Muscle was divided and we encountered the lipoma which was deep to the back muscle. This was carefully taken off of the scapula with cautery. I reapproximated the muscle and closed the skin and subcutaneous tissues with absorbable suture and nylon.  Wounds were dressed.  The patient was uneventfully awakened and taken to the PACU in stable condition.  At the conclusion of the case, all lap and needle counts were correct.      ESTIMATED BLOOD LOSS:  10 mL    INTRAOPERATIVE FINDINGS:  Fatty lesions consistent with lipoma.    Jaswinder Lopez MD

## 2017-02-03 NOTE — IP AVS SNAPSHOT
MRN:[id number]                      After Visit Summary   2/3/2017    Chrystal Solis    MRN: [id number]           Thank you!     Thank you for choosing [facility] for your care. Our goal is always to provide you with excellent care. Hearing back from our patients is one way we can continue to improve our services. Please take a few minutes to complete the written survey that you may receive in the mail after you visit with us. Thank you!        Patient Information     Date Of Birth          1950        About your hospital stay     You were admitted on:  February 3, 2017 You last received care in the:  [facility]    You were discharged on:  February 3, 2017       Who to Call     For medical emergencies, please call 911.  For non-urgent questions about your medical care, please call your primary care provider or clinic, [phone]  For questions related to your surgery, please call your surgery clinic        Attending Provider     Provider    Jaswinder Lopez MD       Primary Care Provider Office Phone # Fax #    Lam Rodríguez [phone]       [facility] [address] [place] [address]        After Care Instructions     Discharge Instructions       Patient to follow up with appointment in 10-14 days for suture removal, call office for appointment at [phone]            Dressing       Keep dressing clean and dry.    Redress wounds with bacitracin and gauze every day.  Leave open to air at night.  Ok to shower                  Your next 10 appointments already scheduled     Feb 09, 2017  9:00 AM   PHYSICAL with Lam Rodríguez MD   [facility]    [address] [facility] [id number] [phone]              Further instructions from your care team       Same Day Surgery Discharge Instructions for  Sedation and General Anesthesia       It's not unusual to feel dizzy, light-headed or  faint for up to 24 hours after surgery or while taking pain medication.  If you have these symptoms: sit for a few minutes before standing and have someone assist you when you get up to walk or use the bathroom.      You should rest and relax for the next 24 hours. We recommend you make arrangements to have an adult stay with you for at least 24 hours after your discharge.  Avoid hazardous and strenuous activity.      DO NOT DRIVE any vehicle or operate mechanical equipment for 24 hours following the end of your surgery.  Even though you may feel normal, your reactions may be affected by the medication you have received.      Do not drink alcoholic beverages for 24 hours following surgery.       Slowly progress to your regular diet as you feel able. It's not unusual to feel nauseated and/or vomit after receiving anesthesia.  If you develop these symptoms, drink clear liquids (apple juice, ginger ale, broth, 7-up, etc. ) until you feel better.  If your nausea and vomiting persists for 24 hours, please notify your surgeon.        All narcotic pain medications, along with inactivity and anesthesia, can cause constipation. Drinking plenty of liquids and increasing fiber intake will help.      For any questions of a medical nature, call your surgeon.      Do not make important decisions for 24 hours.      If you had general anesthesia, you may have a sore throat for a couple of days related to the breathing tube used during surgery.  You may use Cepacol lozenges to help with this discomfort.  If it worsens or if you develop a fever, contact your surgeon.       If you feel your pain is not well managed with the pain medications prescribed by your surgeon, please contact your surgeon's office to let them know so they can address your concerns.       Reasons to contact your surgeon:    1. Signs of possible infection: Check your incision daily for redness, swelling, warmth, red streaks or foul drainage.   2. Elevated  "temperature.  3. Pain not controlled with pain medication and/or rest.   4. Uncontrolled nausea or vomiting.  5. Any questions or concerns.      Pending Results     No orders found from 2017 to 2017.            Admission Information        Provider Department Dept Phone    2/3/2017 Jaswinder Lopez MD, MD [facility] [phone]      Your Vitals Were     Blood Pressure Temperature Respirations    103/75 mmHg 97.4  F (36.3  C) (Oral) 13    Height Weight BMI (Body Mass Index)    1.829 m (6') 111.766 kg (246 lb 6.4 oz) 33.41 kg/m2    Pulse Oximetry          96%        MyChart Information     [employer] lets you send messages to your doctor, view your test results, renew your prescriptions, schedule appointments and more. To sign up, go to [email or web address] . Click on \"Log in\" on the left side of the screen, which will take you to the Welcome page. Then click on \"Sign up Now\" on the right side of the page.     You will be asked to enter the access code listed below, as well as some personal information. Please follow the directions to create your username and password.     Your access code is: [email or web address]  Expires: 2017  9:51 AM     Your access code will  in 90 days. If you need help or a new code, please call your [facility] clinic or [phone].        Care EveryWhere ID     This is your Care EveryWhere ID. This could be used by other organizations to access your [facility] medical records  [id number]           Review of your medicines      START taking        Dose / Directions    HYDROcodone-acetaminophen 5-325 MG per tablet   Commonly known as:  NORCO   Used for:  Lipoma of skin and subcutaneous tissue        Dose:  1-2 tablet   Take 1-2 tablets by mouth every 4 hours as needed for other (Moderate to Severe Pain)   Quantity:  20 tablet   Refills:  0         CONTINUE these medicines which have NOT CHANGED        Dose / Directions    AVAPRO 150 MG tablet   Generic drug:  irbesartan        " Dose:  300 mg   Take 300 mg by mouth daily.   Refills:  0       hydrochlorothiazide 25 MG tablet   Commonly known as:  HYDRODIURIL        Dose:  25 mg   Take 25 mg by mouth as needed   Refills:  0       LIPITOR 10 MG tablet   Generic drug:  atorvastatin        Dose:  10 mg   Take 10 mg by mouth daily.   Refills:  0       NORVASC 10 MG tablet   Generic drug:  amLODIPine        Dose:  10 mg   Take 10 mg by mouth daily.   Refills:  0            Where to get your medicines      Some of these will need a paper prescription and others can be bought over the counter. Ask your nurse if you have questions.     Bring a paper prescription for each of these medications    - HYDROcodone-acetaminophen 5-325 MG per tablet             Protect others around you: Learn how to safely use, store and throw away your medicines at www.disposemymeds.org.             Medication List: This is a list of all your medications and when to take them. Check marks below indicate your daily home schedule. Keep this list as a reference.      Medications           Morning Afternoon Evening Bedtime As Needed    AVAPRO 150 MG tablet   Take 300 mg by mouth daily.   Generic drug:  irbesartan                                hydrochlorothiazide 25 MG tablet   Commonly known as:  HYDRODIURIL   Take 25 mg by mouth as needed                                HYDROcodone-acetaminophen 5-325 MG per tablet   Commonly known as:  NORCO   Take 1-2 tablets by mouth every 4 hours as needed for other (Moderate to Severe Pain)                                LIPITOR 10 MG tablet   Take 10 mg by mouth daily.   Generic drug:  atorvastatin                                NORVASC 10 MG tablet   Take 10 mg by mouth daily.   Generic drug:  amLODIPine

## 2017-02-03 NOTE — DISCHARGE INSTRUCTIONS
Same Day Surgery Discharge Instructions for  Sedation and General Anesthesia       It's not unusual to feel dizzy, light-headed or faint for up to 24 hours after surgery or while taking pain medication.  If you have these symptoms: sit for a few minutes before standing and have someone assist you when you get up to walk or use the bathroom.      You should rest and relax for the next 24 hours. We recommend you make arrangements to have an adult stay with you for at least 24 hours after your discharge.  Avoid hazardous and strenuous activity.      DO NOT DRIVE any vehicle or operate mechanical equipment for 24 hours following the end of your surgery.  Even though you may feel normal, your reactions may be affected by the medication you have received.      Do not drink alcoholic beverages for 24 hours following surgery.       Slowly progress to your regular diet as you feel able. It's not unusual to feel nauseated and/or vomit after receiving anesthesia.  If you develop these symptoms, drink clear liquids (apple juice, ginger ale, broth, 7-up, etc. ) until you feel better.  If your nausea and vomiting persists for 24 hours, please notify your surgeon.        All narcotic pain medications, along with inactivity and anesthesia, can cause constipation. Drinking plenty of liquids and increasing fiber intake will help.      For any questions of a medical nature, call your surgeon.      Do not make important decisions for 24 hours.      If you had general anesthesia, you may have a sore throat for a couple of days related to the breathing tube used during surgery.  You may use Cepacol lozenges to help with this discomfort.  If it worsens or if you develop a fever, contact your surgeon.       If you feel your pain is not well managed with the pain medications prescribed by your surgeon, please contact your surgeon's office to let them know so they can address your concerns.       Reasons to contact your surgeon:    1. Signs  of possible infection: Check your incision daily for redness, swelling, warmth, red streaks or foul drainage.   2. Elevated temperature.  3. Pain not controlled with pain medication and/or rest.   4. Uncontrolled nausea or vomiting.  5. Any questions or concerns.

## 2017-02-03 NOTE — IP AVS SNAPSHOT
[facility]    [address]    [place] [address]    Phone:  [phone]    Fax:  [phone]                                       After Visit Summary   2/3/2017    Chrystal Solis    MRN: [id number]           After Visit Summary Signature Page     I have received my discharge instructions, and my questions have been answered. I have discussed any challenges I see with this plan with the nurse or doctor.    ..........................................................................................................................................  Patient/Patient Representative Signature      ..........................................................................................................................................  Patient Representative Print Name and Relationship to Patient    ..................................................               ................................................  Date                                            Time    ..........................................................................................................................................  Reviewed by Signature/Title    ...................................................              ..............................................  Date                                                            Time

## 2017-02-03 NOTE — BRIEF OP NOTE
[facility] Brief Operative Note    Pre-operative diagnosis: BACK LYPOMA ; LEFT ARM LYPOMA    Post-operative diagnosis Upper right back and upper left arm lipoma      Procedure: Procedure(s):  EXCISE BACK LYPOMA; EXCISE LEFT ARM LYMPOMA  - Wound Class: I-Clean   - Wound Class: I-Clean   Surgeon(s): Surgeon(s) and Role:  Panel 1:     * Jaswinder Lopez MD - Primary     * Keesha Hare PA-C    Panel 2:     * Jaswinder Lopez MD - Primary     * Keesha Hare PA-C - Assisting   Estimated blood loss: 5 mL    Specimens:   ID Type Source Tests Collected by Time Destination   A : A revised  Right upper back mass Tissue Back SURGICAL PATHOLOGY EXAM Jaswinder Lopez MD 2/3/2017 10:42 AM    B : Left upper arm lipoma Tissue Arm, Left SURGICAL PATHOLOGY EXAM Jaswinder Lopez MD 2/3/2017 10:43 AM       Findings: Same as above      Keesha Hare PA-C

## 2017-02-03 NOTE — ANESTHESIA PREPROCEDURE EVALUATION
Anesthesia Evaluation     . Pt has had prior anesthetic.     No history of anesthetic complications     ROS/MED HX    ENT/Pulmonary: Comment: Has had radiation to mouth atiya neck to treat CA of tongue    (+)tobacco use, Past use , . .    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 : . . . :. .       METS/Exercise Tolerance:     Hematologic:         Musculoskeletal:         GI/Hepatic:         Renal/Genitourinary:         Endo:     (+) Obesity, .      Psychiatric:         Infectious Disease:         Malignancy:         Other:               Physical Exam  Normal systems: cardiovascular and pulmonary    Airway   Mallampati: II  Neck ROM: limited    Dental     Cardiovascular       Pulmonary                     Anesthesia Plan      History & Physical Review  History and physical reviewed and following examination; no interval change.    ASA Status:  2 .    NPO Status:  > 8 hours    Plan for MAC          Postoperative Care  Postoperative pain management:  Oral pain medications.      Consents  Anesthetic plan, risks, benefits and alternatives discussed with:  Patient and Spouse..                          .  DPreop diagnosis: BACK LYPOMA ; LEFT ARM LYPOMA   Procedure(s):  EXCISE LESION TRUNK  EXCISE LESION UPPER EXTREMITY  Allergies   Allergen Reactions     No Known Allergies        No current facility-administered medications on file prior to encounter.  Current Outpatient Prescriptions on File Prior to Encounter:  hydrochlorothiazide (HYDRODIURIL) 25 MG tablet Take 25 mg by mouth as needed   amLODIPine (NORVASC) 10 MG tablet Take 10 mg by mouth daily.   irbesartan (AVAPRO) 150 MG tablet Take 300 mg by mouth daily.    atorvastatin (LIPITOR) 10 MG tablet Take 10 mg by mouth daily.     HEMOGLOBIN   Date Value Ref Range Status   01/11/2017 17.3 13.3 - 17.7 g/dL Final       POTASSIUM   Date Value Ref Range Status   01/11/2017  3.8 3.4 - 5.3 mmol/L Final

## 2017-02-03 NOTE — ANESTHESIA CARE TRANSFER NOTE
Patient: Chrystal Solis    Procedure(s):  EXCISE BACK LYPOMA; EXCISE LEFT ARM LYMPOMA  - Wound Class: I-Clean   - Wound Class: I-Clean    Diagnosis: BACK LYPOMA ; LEFT ARM LYPOMA   Diagnosis Additional Information: No value filed.    Anesthesia Type:   MAC     Note:  Airway :Room Air  Patient transferred to:PACU  Comments: Pt awake and able to verbalize needs. Spontaneous respiration without difficulty.  All monitors on and audible. Report given to PACU RN, Vital Signs Stable. Pt denies pain.      Vitals: (Last set prior to Anesthesia Care Transfer)    CRNA VITALS  2/3/2017 1047 - 2/3/2017 1124      2/3/2017             NIBP: 115/66 mmHg    Ht Rate: 65    SpO2: 93 %    EKG: Sinus rhythm                Electronically Signed By: XIOMY Balderas CRNA  February 3, 2017  11:24 AM

## 2017-02-03 NOTE — ANESTHESIA POSTPROCEDURE EVALUATION
Patient: Chrystal Solis    Procedure(s):  EXCISE BACK LYPOMA; EXCISE LEFT ARM LYMPOMA  - Wound Class: I-Clean   - Wound Class: I-Clean    Diagnosis:BACK LYPOMA ; LEFT ARM LYPOMA   Diagnosis Additional Information: No value filed.    Anesthesia Type:  MAC    Note:  Anesthesia Post Evaluation    Patient location during evaluation: PACU  Patient participation: Able to fully participate in evaluation  Level of consciousness: awake  Pain management: adequate  Airway patency: patent  Cardiovascular status: acceptable  Respiratory status: acceptable  Hydration status: acceptable  PONV: none     Anesthetic complications: None          Last vitals:  Filed Vitals:    02/03/17 1130 02/03/17 1145 02/03/17 1200   BP: 103/75 121/76 124/69   Temp:      Resp: 13 18 24   SpO2: 96% 96% 98%         Electronically Signed By: Ed Miller MD  February 3, 2017  12:13 PM

## 2017-02-06 LAB — COPATH REPORT: NORMAL

## 2017-02-09 ENCOUNTER — OFFICE VISIT (OUTPATIENT)
Dept: FAMILY MEDICINE | Facility: CLINIC | Age: 67
End: 2017-02-09
Payer: COMMERCIAL

## 2017-02-09 VITALS
BODY MASS INDEX: 34.13 KG/M2 | HEART RATE: 75 BPM | TEMPERATURE: 97.8 F | DIASTOLIC BLOOD PRESSURE: 75 MMHG | WEIGHT: 252 LBS | RESPIRATION RATE: 18 BRPM | SYSTOLIC BLOOD PRESSURE: 123 MMHG | HEIGHT: 72 IN | OXYGEN SATURATION: 98 %

## 2017-02-09 DIAGNOSIS — E78.5 HYPERLIPIDEMIA LDL GOAL <130: ICD-10-CM

## 2017-02-09 DIAGNOSIS — I10 BENIGN ESSENTIAL HYPERTENSION: ICD-10-CM

## 2017-02-09 DIAGNOSIS — Z23 NEED FOR PROPHYLACTIC VACCINATION AGAINST STREPTOCOCCUS PNEUMONIAE (PNEUMOCOCCUS): ICD-10-CM

## 2017-02-09 DIAGNOSIS — Z11.59 NEED FOR HEPATITIS C SCREENING TEST: ICD-10-CM

## 2017-02-09 DIAGNOSIS — Z00.00 ROUTINE GENERAL MEDICAL EXAMINATION AT A HEALTH CARE FACILITY: Primary | ICD-10-CM

## 2017-02-09 DIAGNOSIS — Z12.11 SCREEN FOR COLON CANCER: ICD-10-CM

## 2017-02-09 DIAGNOSIS — Z13.6 ENCOUNTER FOR ABDOMINAL AORTIC ANEURYSM SCREENING: ICD-10-CM

## 2017-02-09 DIAGNOSIS — Z23 NEED FOR VACCINATION: ICD-10-CM

## 2017-02-09 PROCEDURE — 90670 PCV13 VACCINE IM: CPT | Performed by: INTERNAL MEDICINE

## 2017-02-09 PROCEDURE — 90471 IMMUNIZATION ADMIN: CPT | Performed by: INTERNAL MEDICINE

## 2017-02-09 PROCEDURE — G0402 INITIAL PREVENTIVE EXAM: HCPCS | Performed by: INTERNAL MEDICINE

## 2017-02-09 RX ORDER — HYDROCHLOROTHIAZIDE 25 MG/1
25 TABLET ORAL PRN
Qty: 90 TABLET | Refills: 3 | Status: SHIPPED | OUTPATIENT
Start: 2017-02-09 | End: 2018-03-19

## 2017-02-09 RX ORDER — ATORVASTATIN CALCIUM 10 MG/1
10 TABLET, FILM COATED ORAL DAILY
Qty: 90 TABLET | Refills: 3 | Status: SHIPPED | OUTPATIENT
Start: 2017-02-09 | End: 2018-02-16

## 2017-02-09 RX ORDER — IRBESARTAN 150 MG/1
300 TABLET ORAL DAILY
Qty: 90 TABLET | Refills: 3 | Status: SHIPPED | OUTPATIENT
Start: 2017-02-09 | End: 2017-12-07

## 2017-02-09 RX ORDER — AMLODIPINE BESYLATE 10 MG/1
10 TABLET ORAL DAILY
Qty: 90 TABLET | Refills: 3 | Status: SHIPPED | OUTPATIENT
Start: 2017-02-09 | End: 2018-03-30

## 2017-02-09 NOTE — PROGRESS NOTES
SUBJECTIVE:                                                            Chrystal Solis is a 66 year old male who presents for Preventive Visit.      Are you in the first 12 months of your Medicare Part B coverage?  Yes,  Visual Acuity:  Right Eye: 20/25   Left Eye: 20/25  Both Eyes: 20/20    Healthy Habits:    Do you get at least three servings of calcium containing foods daily (dairy, green leafy vegetables, etc.)? yes    Amount of exercise or daily activities, outside of work: active-at work only    Problems taking medications regularly No    Medication side effects: No    Have you had an eye exam in the past two years? yes    Do you see a dentist twice per year? yes    Do you have sleep apnea, excessive snoring or daytime drowsiness?no    COGNITIVE SCREEN  1) Repeat 3 items (Banana, Sunrise, Chair)    2) Clock draw: NORMAL  3) 3 item recall: Recalls 1 object   Results: NORMAL clock, 1-2 items recalled: COGNITIVE IMPAIRMENT LESS LIKELY    Mini-CogTM Copyright CASSANDRA Smith. Licensed by the author for use in [facility]; reprinted with permission ([email or web address]@[email or web address]). All rights reserved.          All Histories reviewed and updated in [facility] as appropriate.  Social History   Substance Use Topics     Smoking status: Former Smoker -- 0.25 packs/day for 2 years     Types: Cigarettes     Quit date: 01/01/1978     Smokeless tobacco: Never Used      Comment: smoked socially in college     Alcohol Use: 0.0 oz/week     0 Standard drinks or equivalent per week      Comment: 4-5 drinks a week       The patient does not drink >3 drinks per day nor >7 drinks per week.    Today's PHQ-2 Score:   PHQ-2 ( 1999 Pfizer) 2/9/2017 1/11/2017   Q1: Little interest or pleasure in doing things 0 0   Q2: Feeling down, depressed or hopeless 0 0   PHQ-2 Score 0 0       Do you feel safe in your environment - Yes    Do you have a Health Care Directive?: Yes: Patient states has Advance Directive and will bring in a copy to clinic.    Current  providers sharing in care for this patient include:   Patient Care Team:  Lam Rodríguez MD as PCP - General (Internal Medicine)      Hearing impairment: No    Ability to successfully perform activities of daily living: Yes, no assistance needed     Fall risk:  Fallen 2 or more times in the past year?: No  Any fall with injury in the past year?: No    Home safety:  none identified      The following health maintenance items are reviewed in Epic and correct as of today:  Health Maintenance   Topic Date Due     ADVANCE DIRECTIVE PLANNING Q5 YRS (NO INBASKET)  12/11/1969     HEPATITIS C SCREENING  12/11/1969     COLON CANCER SCREEN (SYSTEM ASSIGNED)  12/11/2001     PNEUMOCOCCAL (1 of 2 - PCV13) 12/11/2016     AORTIC ANEURYSM SCREENING (SYSTEM ASSIGNED)  12/11/2016     INFLUENZA VACCINE (SYSTEM ASSIGNED)  09/01/2017     FALL RISK ASSESSMENT  01/11/2018     LIPID SCREEN Q5 YR MALE (SYSTEM ASSIGNED)  01/11/2022     TETANUS IMMUNIZATION (SYSTEM ASSIGNED)  08/01/2022              ROS:  Constitutional, HEENT, cardiovascular, pulmonary, GI, , musculoskeletal, neuro, skin, endocrine and psych systems are negative, except as otherwise noted.    Patient Active Problem List   Diagnosis     Squamous cell carcinoma of tongue (H)     Benign essential hypertension     Hyperlipidemia LDL goal <130     Peripheral polyneuropathy (H)     Lipoma of skin and subcutaneous tissue     Past Surgical History   Procedure Laterality Date     Orthopedic surgery  2002     torn ligament right shoulder     Hernia repair Left 2002     Soft tissue surgery  12/6/10     excisional biopsy of tongue lesion     Tonsillectomy  Age 6     Colonoscopy  3/2009     Ent surgery       Head & neck surgery       Biopsy       Excise lesion trunk N/A 2/3/2017     Procedure: EXCISE LESION TRUNK;  Surgeon: Jaswinder Lopez MD;  Location: [facility]     Excise lesion upper extremity Left 2/3/2017     Procedure: EXCISE LESION UPPER EXTREMITY;  Surgeon: Jaswinder Lopez  MD Mike;  Location: [facility]       Social History   Substance Use Topics     Smoking status: Former Smoker -- 0.25 packs/day for 2 years     Types: Cigarettes     Quit date: 01/01/1978     Smokeless tobacco: Never Used      Comment: smoked socially in college     Alcohol Use: 0.0 oz/week     0 Standard drinks or equivalent per week      Comment: 4-5 drinks a week     Family History   Problem Relation Age of Onset     CEREBROVASCULAR DISEASE Mother      Hypertension Mother      Respiratory Father      heavy smoker     CANCER Maternal Grandfather      lip cancer--heavy smoker     Unknown/Adopted Son      Unknown/Adopted Daughter          Current Outpatient Prescriptions   Medication Sig Dispense Refill     hydrochlorothiazide (HYDRODIURIL) 25 MG tablet Take 25 mg by mouth as needed       amLODIPine (NORVASC) 10 MG tablet Take 10 mg by mouth daily.       irbesartan (AVAPRO) 150 MG tablet Take 300 mg by mouth daily.        atorvastatin (LIPITOR) 10 MG tablet Take 10 mg by mouth daily.       No Known Allergies  OBJECTIVE:                                                            /75 mmHg  Pulse 75  Temp(Src) 97.8  F (36.6  C) (Tympanic)  Resp 18  Ht 6' (1.829 m)  Wt 252 lb (114.306 kg)  BMI 34.17 kg/m2  SpO2 98% Estimated body mass index is 34.17 kg/(m^2) as calculated from the following:    Height as of this encounter: 6' (1.829 m).    Weight as of this encounter: 252 lb (114.306 kg).  EXAM:   GENERAL: healthy, alert and no distress  EYES: Eyes grossly normal to inspection, PERRL and conjunctivae and sclerae normal  HENT: ear canals and TM's normal, nose and mouth without ulcers or lesions  NECK: fibrotic tissue in neck without masses or adenopathy  RESP: lungs clear to auscultation - no rales, rhonchi or wheezes  CV: regular rate and rhythm, normal S1 S2, no S3 or S4, no murmur, click or rub, no peripheral edema and peripheral pulses strong  ABDOMEN: soft, nontender, no hepatosplenomegaly, no masses and  bowel sounds normal  RECTAL: normal sphincter tone, no rectal masses, prostate normal size, smooth, nontender without nodules or masses  MS: no gross musculoskeletal defects noted, no edema  SKIN: no suspicious lesions or rashes; well healing lipoma removal scars  NEURO: Normal strength and tone, mentation intact and speech normal  PSYCH: mentation appears normal, affect normal/bright    ASSESSMENT / PLAN:                                                            1. Routine general medical examination at a health care facility      2. Benign essential hypertension    - hydrochlorothiazide (HYDRODIURIL) 25 MG tablet; Take 1 tablet (25 mg) by mouth as needed  Dispense: 90 tablet; Refill: 3  - amLODIPine (NORVASC) 10 MG tablet; Take 1 tablet (10 mg) by mouth daily  Dispense: 90 tablet; Refill: 3  - irbesartan (AVAPRO) 150 MG tablet; Take 2 tablets (300 mg) by mouth daily  Dispense: 90 tablet; Refill: 3    3. Hyperlipidemia LDL goal <130    - atorvastatin (LIPITOR) 10 MG tablet; Take 1 tablet (10 mg) by mouth daily  Dispense: 90 tablet; Refill: 3    4. Screen for colon cancer    - GASTROENTEROLOGY ADULT REF PROCEDURE ONLY    5. Need for prophylactic vaccination against Streptococcus pneumoniae (pneumococcus)  Prevnar 13 today    6. Need for hepatitis C screening test  With next lab draw    7. Encounter for abdominal aortic aneurysm screening    -  Aorta Medicare AAA Screening; Future    End of Life Planning:  Patient currently has an advanced directive: No.  I have verified the patient's ablity to prepare an advanced directive/make health care decisions.  Literature was provided to assist patient in preparing an advanced directive.    COUNSELING:  Reviewed preventive health counseling, as reflected in patient instructions       Consider AAA screening for ages 65-75 and smoking history       Regular exercise       Healthy diet/nutrition       Colon cancer screening       Prostate cancer screening        Estimated body  mass index is 34.17 kg/(m^2) as calculated from the following:    Height as of this encounter: 6' (1.829 m).    Weight as of this encounter: 252 lb (114.306 kg).  Weight management plan: Discussed healthy diet and exercise guidelines and patient will follow up in 12 months in clinic to re-evaluate.   reports that he quit smoking about 39 years ago. His smoking use included Cigarettes. He has a .5 pack-year smoking history. He has never used smokeless tobacco.      Appropriate preventive services were discussed with this patient, including applicable screening as appropriate for cardiovascular disease, diabetes, osteopenia/osteoporosis, and glaucoma.  As appropriate for age/gender, discussed screening for colorectal cancer, prostate cancer, breast cancer, and cervical cancer. Checklist reviewing preventive services available has been given to the patient.    Reviewed patients plan of care and provided an AVS. The Basic Care Plan (routine screening as documented in Health Maintenance) for [place] meets the Care Plan requirement. This Care Plan has been established and reviewed with the Patient.    Counseling Resources:  ATP IV Guidelines  Pooled Cohorts Equation Calculator  Breast Cancer Risk Calculator  FRAX Risk Assessment  ICSI Preventive Guidelines  Dietary Guidelines for Americans, 2010  USDA's MyPlate  ASA Prophylaxis  Lung CA Screening    Lam Rodríguez MD  [facility]

## 2017-02-09 NOTE — NURSING NOTE
Chief Complaint   Patient presents with     Wellness Visit     Not Fasting       Initial /75 mmHg  Pulse 75  Temp(Src) 97.8  F (36.6  C) (Tympanic)  Resp 18  Ht 6' (1.829 m)  Wt 252 lb (114.306 kg)  BMI 34.17 kg/m2  SpO2 98% Estimated body mass index is 34.17 kg/(m^2) as calculated from the following:    Height as of this encounter: 6' (1.829 m).    Weight as of this encounter: 252 lb (114.306 kg).  Medication Reconciliation: complete   Makayla Malave CMA (AAMA)

## 2017-02-09 NOTE — MR AVS SNAPSHOT
After Visit Summary   2/9/2017    Chrystal Solis    MRN: [id number]           Patient Information     Date Of Birth          1950        Visit Information        Provider Department      2/9/2017 9:00 AM Lam Rodríguez MD [facility]        Today's Diagnoses     Routine general medical examination at a health care facility    -  1     Benign essential hypertension         Hyperlipidemia LDL goal <130         Screen for colon cancer         Need for prophylactic vaccination against Streptococcus pneumoniae (pneumococcus)         Need for hepatitis C screening test         Encounter for abdominal aortic aneurysm screening           Care Instructions      Preventive Health Recommendations:       Male Ages 65 and over    Yearly exam:             See your health care provider every year in order to  o   Review health changes.   o   Discuss preventive care.    o   Review your medicines if your doctor has prescribed any.    Talk with your health care provider about whether you should have a test to screen for prostate cancer (PSA).    Every 3 years, have a diabetes test (fasting glucose). If you are at risk for diabetes, you should have this test more often.    Every 5 years, have a cholesterol test. Have this test more often if you are at risk for high cholesterol or heart disease.     Every 10 years, have a colonoscopy. Or, have a yearly FIT test (stool test). These exams will check for colon cancer.    Talk to with your health care provider about screening for Abdominal Aortic Aneurysm if you have a family history of AAA or have a history of smoking.  Shots:     Get a flu shot each year.     Get a tetanus shot every 10 years.     Talk to your doctor about your pneumonia vaccines. There are now two you should receive - Pneumovax (PPSV 23) and Prevnar (PCV 13).    Talk to your doctor about a shingles vaccine.     Talk to your doctor about the hepatitis B vaccine.  Nutrition:     Eat at  least 5 servings of fruits and vegetables each day.     Eat whole-grain bread, whole-wheat pasta and brown rice instead of white grains and rice.     Talk to your doctor about Calcium and Vitamin D.   Lifestyle    Exercise for at least 150 minutes a week (30 minutes a day, 5 days a week). This will help you control your weight and prevent disease.     Limit alcohol to one drink per day.     No smoking.     Wear sunscreen to prevent skin cancer.     See your dentist every six months for an exam and cleaning.     See your eye doctor every 1 to 2 years to screen for conditions such as glaucoma, macular degeneration and cataracts.        Follow-ups after your visit        Additional Services     GASTROENTEROLOGY ADULT REF PROCEDURE ONLY       Last Lab Result: CREATININE (mg/dL)       Date                     Value                 01/11/2017               0.75             ----------  Body mass index is 34.17 kg/(m^2).     Needed:  No  Language:  English    Patient will be contacted to schedule procedure.     Please be aware that coverage of these services is subject to the terms and limitations of your health insurance plan.  Call member services at your health plan with any benefit or coverage questions.  Any procedures must be performed at a [facility] facility OR coordinated by your clinic's referral office.    Please bring the following with you to your appointment:    (1) Any X-Rays, CTs or MRIs which have been performed.  Contact the facility where they were done to arrange for  prior to your scheduled appointment.    (2) List of current medications   (3) This referral request   (4) Any documents/labs given to you for this referral                  Follow-up notes from your care team     Return in about 1 year (around 2/9/2018) for Physical Exam.      Future tests that were ordered for you today     Open Future Orders        Priority Expected Expires Ordered    [place] Aorta Medicare AAA Screening  "Routine  2018            Who to contact     If you have questions or need follow up information about today's clinic visit or your schedule please contact [facility] directly at [phone].  Normal or non-critical lab and imaging results will be communicated to you by MyChart, letter or phone within 4 business days after the clinic has received the results. If you do not hear from us within 7 days, please contact the clinic through MyChart or phone. If you have a critical or abnormal lab result, we will notify you by phone as soon as possible.  Submit refill requests through [employer] or call your pharmacy and they will forward the refill request to us. Please allow 3 business days for your refill to be completed.          Additional Information About Your Visit        [employer] Information     [employer] lets you send messages to your doctor, view your test results, renew your prescriptions, schedule appointments and more. To sign up, go to [email or web address] . Click on \"Log in\" on the left side of the screen, which will take you to the Welcome page. Then click on \"Sign up Now\" on the right side of the page.     You will be asked to enter the access code listed below, as well as some personal information. Please follow the directions to create your username and password.     Your access code is: [email or web address]  Expires: 2017  9:51 AM     Your access code will  in 90 days. If you need help or a new code, please call your [place] clinic or [phone].        Care EveryWhere ID     This is your Care EveryWhere ID. This could be used by other organizations to access your [facility] medical records  [id number]        Your Vitals Were     Pulse Temperature Respirations Height BMI (Body Mass Index) Pulse Oximetry    75 97.8  F (36.6  C) (Tympanic) 18 6' (1.829 m) 34.17 kg/m2 98%       Blood Pressure from Last 3 Encounters:   17 123/75   17 123/81   17 140/84    Weight " from Last 3 Encounters:   02/09/17 252 lb (114.306 kg)   02/03/17 246 lb 6.4 oz (111.766 kg)   01/24/17 245 lb (111.131 kg)              We Performed the Following     GASTROENTEROLOGY ADULT REF PROCEDURE ONLY          Where to get your medicines      These medications were sent to [facility] - [place], [place] - [address]  [address] [address]     Phone:  [phone]    - amLODIPine 10 MG tablet  - atorvastatin 10 MG tablet  - hydrochlorothiazide 25 MG tablet  - irbesartan 150 MG tablet       Primary Care Provider Office Phone # Fax #    Lam Rodríguez [phone]       [facility] [address]  [facility] [address]        Thank you!     Thank you for choosing [facility]  for your care. Our goal is always to provide you with excellent care. Hearing back from our patients is one way we can continue to improve our services. Please take a few minutes to complete the written survey that you may receive in the mail after your visit with us. Thank you!             Your Updated Medication List - Protect others around you: Learn how to safely use, store and throw away your medicines at www.disposemymeds.org.          This list is accurate as of: 2/9/17  9:39 AM.  Always use your most recent med list.                   Brand Name Dispense Instructions for use    amLODIPine 10 MG tablet    NORVASC    90 tablet    Take 1 tablet (10 mg) by mouth daily       atorvastatin 10 MG tablet    LIPITOR    90 tablet    Take 1 tablet (10 mg) by mouth daily       hydrochlorothiazide 25 MG tablet    HYDRODIURIL    90 tablet    Take 1 tablet (25 mg) by mouth as needed       irbesartan 150 MG tablet    AVAPRO    90 tablet    Take 2 tablets (300 mg) by mouth daily

## 2017-02-09 NOTE — PROGRESS NOTES
Screening Questionnaire for Adult Immunization     Are you sick today?   No    Do you have allergies to medications, food or any vaccine?   No    Have you ever had a serious reaction after receiving a vaccination?   No    Do you have a long-term health problem with heart disease, lung disease,  asthma, kidney disease, diabetes, anemia, metabolic or blood disease?   No    Do you have cancer, leukemia, AIDS, or any immune system problem?   No    Do you take cortisone, prednisone, other steroids, or anticancer drugs, or  have you had any x-ray (radiation) treatments?   No    Have you had a seizure, brain, or other nervous system problem?   No    During the past year, have you received a transfusion of blood or blood       products, or been given a medicine called immune (gamma) globulin?   No    For women: Are you pregnant or is there a chance you could become         pregnant during the next month?   No    Have you received any vaccinations in the past 4 weeks?   No     Immunization questionnaire answers were all negative.      MNVFC doesn't apply on this patient     Screening performed by Brielle Pittman on 2/9/2017 at 10:30 AM.

## 2017-02-13 ENCOUNTER — TELEPHONE (OUTPATIENT)
Dept: SURGERY | Facility: CLINIC | Age: 67
End: 2017-02-13

## 2017-02-13 ENCOUNTER — OFFICE VISIT (OUTPATIENT)
Dept: SURGERY | Facility: CLINIC | Age: 67
End: 2017-02-13
Payer: COMMERCIAL

## 2017-02-13 DIAGNOSIS — Z09 SURGERY FOLLOW-UP EXAMINATION: Primary | ICD-10-CM

## 2017-02-13 PROCEDURE — 99207 ZZC NO CHARGE NURSE ONLY: CPT

## 2017-02-13 NOTE — MR AVS SNAPSHOT
"              After Visit Summary   2017    Chrystal Solis    MRN: [id number]           Patient Information     Date Of Birth          1950        Visit Information        Provider Department      2017 3:45 PM Nurse, [facility]      Today's Diagnoses     Surgery follow-up examination    -  1       Follow-ups after your visit        Who to contact     If you have questions or need follow up information about today's clinic visit or your schedule please contact [facility] directly at [phone].  Normal or non-critical lab and imaging results will be communicated to you by Distil Networkshart, letter or phone within 4 business days after the clinic has received the results. If you do not hear from us within 7 days, please contact the clinic through Userstorylabt or phone. If you have a critical or abnormal lab result, we will notify you by phone as soon as possible.  Submit refill requests through Akademos or call your pharmacy and they will forward the refill request to us. Please allow 3 business days for your refill to be completed.          Additional Information About Your Visit        MyChart Information     Akademos lets you send messages to your doctor, view your test results, renew your prescriptions, schedule appointments and more. To sign up, go to [email or web address] . Click on \"Log in\" on the left side of the screen, which will take you to the Welcome page. Then click on \"Sign up Now\" on the right side of the page.     You will be asked to enter the access code listed below, as well as some personal information. Please follow the directions to create your username and password.     Your access code is: [email or web address]  Expires: 2017  9:51 AM     Your access code will  in 90 days. If you need help or a new code, please call your [place] clinic or [phone].        Care EveryWhere ID     This is your " Care EveryWhere ID. This could be used by other organizations to access your [place] medical records  [id number]         Blood Pressure from Last 3 Encounters:   02/09/17 123/75   02/03/17 123/81   01/24/17 140/84    Weight from Last 3 Encounters:   02/09/17 252 lb (114.3 kg)   02/03/17 246 lb 6.4 oz (111.8 kg)   01/24/17 245 lb (111.1 kg)              Today, you had the following     No orders found for display       Primary Care Provider Office Phone # Fax #    Lam Rodríguez [phone]       [facility] [address] [place] [address]        Thank you!     Thank you for choosing [facility]  for your care. Our goal is always to provide you with excellent care. Hearing back from our patients is one way we can continue to improve our services. Please take a few minutes to complete the written survey that you may receive in the mail after your visit with us. Thank you!             Your Updated Medication List - Protect others around you: Learn how to safely use, store and throw away your medicines at www.disposemymeds.org.          This list is accurate as of: 2/13/17  4:03 PM.  Always use your most recent med list.                   Brand Name Dispense Instructions for use    amLODIPine 10 MG tablet    NORVASC    90 tablet    Take 1 tablet (10 mg) by mouth daily       atorvastatin 10 MG tablet    LIPITOR    90 tablet    Take 1 tablet (10 mg) by mouth daily       hydrochlorothiazide 25 MG tablet    HYDRODIURIL    90 tablet    Take 1 tablet (25 mg) by mouth as needed       irbesartan 150 MG tablet    AVAPRO    90 tablet    Take 2 tablets (300 mg) by mouth daily

## 2017-02-13 NOTE — TELEPHONE ENCOUNTER
Name of caller: Patient    Reason for Call:  Had surgery need stitches removed, no pa available this week. Would like done today.      Surgeon:  Dr. Lopez     Recent Surgery:  Yes.    If yes, when & what type:  2/3/17-EXCISION LEFT ARM LIPOMA AND BACK LIPOMA      Best phone number to reach pt at is: [phone]  Ok to leave a message with medical info? Yes.    I didn't know if you could do this or not.

## 2017-02-13 NOTE — NURSING NOTE
Patient here today to have sutures removed from right upper back and left upper arm. Incision appears well healed with no signs of infection. Sutures removed without issue. Advised patient to call if he has any questions or concerns, may follow-up with PCP with regular needs.     Solange Doyle RN BSN

## 2017-02-22 ENCOUNTER — HOSPITAL ENCOUNTER (OUTPATIENT)
Dept: ULTRASOUND IMAGING | Facility: CLINIC | Age: 67
Discharge: HOME | End: 2017-02-22
Attending: INTERNAL MEDICINE | Admitting: INTERNAL MEDICINE
Payer: COMMERCIAL

## 2017-02-22 DIAGNOSIS — Z13.6 ENCOUNTER FOR ABDOMINAL AORTIC ANEURYSM SCREENING: ICD-10-CM

## 2017-02-22 PROCEDURE — 76706 US ABDL AORTA SCREEN AAA: CPT

## 2017-02-22 NOTE — PROGRESS NOTES
The following letter pertains to your most recent diagnostic tests:    -Your abdominal aortic aneurysm screen ultrasound did NOT show evidence for abdominal aortic aneurysm.           Sincerely,    Dr. Rodríguez

## 2017-02-22 NOTE — LETTER
[address]  [address]  [place]  [address]  Tel: [phone]    February 22, 2017    Chrystal Solis  [address]  [place] [address]        Dear Mr. Solis,    -Your abdominal aortic aneurysm screen ultrasound did NOT show evidence for abdominal aortic aneurysm.     Sincerely,     Dr. Rodríguez     Results for orders placed or performed during the hospital encounter of 02/22/17    Aorta Medicare AAA Screening    Narrative    ULTRASOUND AORTA MEDICARE AAA SCREENING  2/22/2017 8:16 AM     HISTORY:  Encounter for screening for cardiovascular disorders.    FINDINGS: Examination of the abdominal aorta is performed.    Proximal abdominal aorta: 2.8 x 2.4 cm.    Mid abdominal aorta: 2.3 x 2.1 cm.    Distal abdominal aorta: 2.0 x 1.9 cm.    Proximal right common iliac artery is normal in caliber measuring 1.2  cm in maximal diameter.    Proximal left common iliac artery is mildly ectatic measuring 1.3 cm  in maximal diameter.      Impression    IMPRESSION: No evidence of abdominal aortic aneurysm.    JENS RAYMUNDO MD

## 2017-03-08 ENCOUNTER — HOSPITAL ENCOUNTER (OUTPATIENT)
Facility: CLINIC | Age: 67
Discharge: HOME | End: 2017-03-08
Attending: INTERNAL MEDICINE | Admitting: INTERNAL MEDICINE
Payer: COMMERCIAL

## 2017-03-08 VITALS
DIASTOLIC BLOOD PRESSURE: 73 MMHG | SYSTOLIC BLOOD PRESSURE: 132 MMHG | RESPIRATION RATE: 19 BRPM | OXYGEN SATURATION: 94 %

## 2017-03-08 LAB — COLONOSCOPY: NORMAL

## 2017-03-08 PROCEDURE — 88305 TISSUE EXAM BY PATHOLOGIST: CPT | Mod: 26 | Performed by: INTERNAL MEDICINE

## 2017-03-08 PROCEDURE — 25000125 ZZHC RX 250: Performed by: INTERNAL MEDICINE

## 2017-03-08 PROCEDURE — 45380 COLONOSCOPY AND BIOPSY: CPT | Mod: PT | Performed by: INTERNAL MEDICINE

## 2017-03-08 PROCEDURE — 25000128 H RX IP 250 OP 636: Performed by: INTERNAL MEDICINE

## 2017-03-08 PROCEDURE — G0500 MOD SEDAT ENDO SERVICE >5YRS: HCPCS | Performed by: INTERNAL MEDICINE

## 2017-03-08 PROCEDURE — 88305 TISSUE EXAM BY PATHOLOGIST: CPT | Performed by: INTERNAL MEDICINE

## 2017-03-08 RX ORDER — FLUMAZENIL 0.1 MG/ML
0.2 INJECTION, SOLUTION INTRAVENOUS
Status: DISCONTINUED | OUTPATIENT
Start: 2017-03-08 | End: 2017-03-08 | Stop reason: HOSPADM

## 2017-03-08 RX ORDER — ONDANSETRON 2 MG/ML
4 INJECTION INTRAMUSCULAR; INTRAVENOUS EVERY 6 HOURS PRN
Status: DISCONTINUED | OUTPATIENT
Start: 2017-03-08 | End: 2017-03-08 | Stop reason: HOSPADM

## 2017-03-08 RX ORDER — LIDOCAINE 40 MG/G
CREAM TOPICAL
Status: DISCONTINUED | OUTPATIENT
Start: 2017-03-08 | End: 2017-03-08 | Stop reason: HOSPADM

## 2017-03-08 RX ORDER — ONDANSETRON 2 MG/ML
4 INJECTION INTRAMUSCULAR; INTRAVENOUS
Status: DISCONTINUED | OUTPATIENT
Start: 2017-03-08 | End: 2017-03-08 | Stop reason: HOSPADM

## 2017-03-08 RX ORDER — ONDANSETRON 4 MG/1
4 TABLET, ORALLY DISINTEGRATING ORAL EVERY 6 HOURS PRN
Status: DISCONTINUED | OUTPATIENT
Start: 2017-03-08 | End: 2017-03-08 | Stop reason: HOSPADM

## 2017-03-08 RX ORDER — NALOXONE HYDROCHLORIDE 0.4 MG/ML
.1-.4 INJECTION, SOLUTION INTRAMUSCULAR; INTRAVENOUS; SUBCUTANEOUS
Status: DISCONTINUED | OUTPATIENT
Start: 2017-03-08 | End: 2017-03-08 | Stop reason: HOSPADM

## 2017-03-08 RX ORDER — FENTANYL CITRATE 50 UG/ML
INJECTION, SOLUTION INTRAMUSCULAR; INTRAVENOUS PRN
Status: DISCONTINUED | OUTPATIENT
Start: 2017-03-08 | End: 2017-03-08 | Stop reason: HOSPADM

## 2017-03-08 NOTE — H&P
Pre-Endoscopy History and Physical     Chrystal Solis MRN# [id number]   YOB: 1950 Age: 66 year old     Date of Procedure: 3/8/2017  Primary care provider: Lam Rodríguez  Type of Endoscopy: Colonoscopy with possible biopsy, possible polypectomy  Reason for Procedure: screen  Type of Anesthesia Anticipated: Conscious Sedation    HPI:    Chrystal is a 66 year old male who will be undergoing the above procedure.      A history and physical has been performed. The patient's medications and allergies have been reviewed. The risks and benefits of the procedure and the sedation options and risks were discussed with the patient.  All questions were answered and informed consent was obtained.      He denies a personal or family history of anesthesia complications or bleeding disorders.     Patient Active Problem List   Diagnosis     Squamous cell carcinoma of tongue (H)     Benign essential hypertension     Hyperlipidemia LDL goal <130     Peripheral polyneuropathy (H)     Lipoma of skin and subcutaneous tissue        Past Medical History   Diagnosis Date     Benign essential hypertension 1/11/2017     Bronchitis      Hearing loss, mixed, bilateral      Hyperlipidemia LDL goal <130 1/11/2017     Hypertension      Intervertebral cervical disc disorder with myelopathy, cervical region 2006     had injections     Malignant neoplasm (H) 12/6/10     squamous cell of tongue      Numbness and tingling      numbness in toes     Peripheral polyneuropathy (H) 1/11/2017     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        Past Surgical History   Procedure Laterality Date     Orthopedic surgery  2002     torn ligament right shoulder     Hernia repair Left 2002     Soft tissue surgery  12/6/10     excisional biopsy of tongue lesion     Tonsillectomy  Age 6     Colonoscopy  3/2009     Ent surgery       Head & neck surgery       Biopsy       Excise lesion trunk N/A 2/3/2017     Procedure: EXCISE LESION TRUNK;   Surgeon: Jaswinder Lopez MD;  Location: [facility]     Excise lesion upper extremity Left 2/3/2017     Procedure: EXCISE LESION UPPER EXTREMITY;  Surgeon: Jaswinder Lopez MD;  Location: [facility]       Social History   Substance Use Topics     Smoking status: Former Smoker     Packs/day: 0.25     Years: 2.00     Types: Cigarettes     Quit date: 1/1/1978     Smokeless tobacco: Never Used      Comment: smoked socially in college     Alcohol use 0.0 oz/week     0 Standard drinks or equivalent per week      Comment: 4-5 drinks a week       Family History   Problem Relation Age of Onset     CEREBROVASCULAR DISEASE Mother      Hypertension Mother      Respiratory Father      heavy smoker     CANCER Maternal Grandfather      lip cancer--heavy smoker     Unknown/Adopted Son      Unknown/Adopted Daughter        Prior to Admission medications    Medication Sig Start Date End Date Taking? Authorizing Provider   hydrochlorothiazide (HYDRODIURIL) 25 MG tablet Take 1 tablet (25 mg) by mouth as needed 2/9/17   Lam Rodríguez MD   amLODIPine (NORVASC) 10 MG tablet Take 1 tablet (10 mg) by mouth daily 2/9/17   Lam Rodríguez MD   irbesartan (AVAPRO) 150 MG tablet Take 2 tablets (300 mg) by mouth daily 2/9/17   Lam Rodríguez MD   atorvastatin (LIPITOR) 10 MG tablet Take 1 tablet (10 mg) by mouth daily 2/9/17   Lam Rodríguez MD       No Known Allergies     REVIEW OF SYSTEMS:   5 point ROS negative except as noted above in HPI, including Gen., Resp., CV, GI &  system review.    PHYSICAL EXAM:   There were no vitals taken for this visit. Estimated body mass index is 34.18 kg/(m^2) as calculated from the following:    Height as of 2/9/17: 1.829 m (6').    Weight as of 2/9/17: 114.3 kg (252 lb).   GENERAL APPEARANCE: alert, and oriented  MENTAL STATUS: alert  AIRWAY EXAM: Mallampatti Class I (visualization of the soft palate, fauces, uvula, anterior and posterior pillars)  RESP: lungs clear to auscultation - no rales,  rhonchi or wheezes  CV: regular rates and rhythm  DIAGNOSTICS:    Not indicated    IMPRESSION   ASA Class 2 - Mild systemic disease    PLAN:   Plan for Colonoscopy with possible biopsy, possible polypectomy. We discussed the risks, benefits and alternatives and the patient wished to proceed.    The above has been forwarded to the consulting provider.      Signed Electronically by: Yung Maguire  March 8, 2017

## 2017-03-09 LAB — COPATH REPORT: NORMAL

## 2017-07-18 ENCOUNTER — TRANSFERRED RECORDS (OUTPATIENT)
Dept: HEALTH INFORMATION MANAGEMENT | Facility: CLINIC | Age: 67
End: 2017-07-18

## 2017-12-07 DIAGNOSIS — I10 BENIGN ESSENTIAL HYPERTENSION: ICD-10-CM

## 2017-12-07 RX ORDER — IRBESARTAN 150 MG/1
300 TABLET ORAL DAILY
Qty: 180 TABLET | Refills: 0 | Status: SHIPPED | OUTPATIENT
Start: 2017-12-07 | End: 2018-03-09

## 2017-12-07 RX ORDER — IRBESARTAN 150 MG/1
300 TABLET ORAL DAILY
Qty: 90 TABLET | Refills: 3 | Status: CANCELLED | OUTPATIENT
Start: 2017-12-07

## 2017-12-07 NOTE — TELEPHONE ENCOUNTER
Prescription approved per [facility] Refill Protocol.  Sadie NORIEGA RN    Requested Prescriptions   Pending Prescriptions Disp Refills     irbesartan (AVAPRO) 150 MG tablet 90 tablet 3     Sig: Take 2 tablets (300 mg) by mouth daily    Angiotensin-II Receptors Passed    12/7/2017  2:15 PM       Passed - Blood pressure under 140/90 in past 12 months.    BP Readings from Last 3 Encounters:   03/08/17 132/73   02/09/17 123/75   02/03/17 123/81                Passed - Recent or future visit with authorizing provider's specialty    Patient had office visit in the last year or has a visit in the next 30 days with authorizing provider.  See chart review.              Passed - Patient is age 18 or older       Passed - Normal serum creatinine on file in past 12 months    Recent Labs   Lab Test  01/11/17   0959   CR  0.75            Passed - Normal serum potassium on file in past 12 months    Recent Labs   Lab Test  01/11/17   0959   POTASSIUM  3.8

## 2017-12-11 ENCOUNTER — RADIANT APPOINTMENT (OUTPATIENT)
Dept: GENERAL RADIOLOGY | Facility: CLINIC | Age: 67
End: 2017-12-11
Attending: PHYSICIAN ASSISTANT
Payer: OTHER MISCELLANEOUS

## 2017-12-11 ENCOUNTER — OFFICE VISIT (OUTPATIENT)
Dept: URGENT CARE | Facility: URGENT CARE | Age: 67
End: 2017-12-11
Payer: OTHER MISCELLANEOUS

## 2017-12-11 ENCOUNTER — TELEPHONE (OUTPATIENT)
Dept: FAMILY MEDICINE | Facility: CLINIC | Age: 67
End: 2017-12-11

## 2017-12-11 VITALS
OXYGEN SATURATION: 96 % | DIASTOLIC BLOOD PRESSURE: 80 MMHG | HEART RATE: 95 BPM | SYSTOLIC BLOOD PRESSURE: 156 MMHG | WEIGHT: 254 LBS | BODY MASS INDEX: 34.45 KG/M2 | RESPIRATION RATE: 20 BRPM

## 2017-12-11 DIAGNOSIS — S22.42XA CLOSED FRACTURE OF MULTIPLE RIBS OF LEFT SIDE, INITIAL ENCOUNTER: ICD-10-CM
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
## 2017-12-11 DIAGNOSIS — S69.91XA WRIST INJURY, RIGHT, INITIAL ENCOUNTER: ICD-10-CM

## 2017-12-11 PROCEDURE — 73110 X-RAY EXAM OF WRIST: CPT | Mod: RT

## 2017-12-11 PROCEDURE — 71101 X-RAY EXAM UNILAT RIBS/CHEST: CPT | Mod: LT

## 2017-12-11 PROCEDURE — 99214 OFFICE O/P EST MOD 30 MIN: CPT | Performed by: PHYSICIAN ASSISTANT

## 2017-12-11 RX ORDER — OXYCODONE AND ACETAMINOPHEN 5; 325 MG/1; MG/1
1 TABLET ORAL EVERY 6 HOURS PRN
Qty: 20 TABLET | Refills: 0 | Status: SHIPPED | OUTPATIENT
Start: 2017-12-11 | End: 2017-12-15

## 2017-12-11 NOTE — NURSING NOTE
Chief Complaint   Patient presents with     Rib Pain     fell aprox 2 feet off a ladder at work and hit lt side of chest     Work Comp       Initial /80 (Cuff Size: Adult Large)  Pulse 95  Resp 20  Wt 254 lb (115.2 kg)  SpO2 96%  BMI 34.45 kg/m2 Estimated body mass index is 34.45 kg/(m^2) as calculated from the following:    Height as of 2/9/17: 6' (1.829 m).    Weight as of this encounter: 254 lb (115.2 kg).  Medication Reconciliation: complete

## 2017-12-11 NOTE — TELEPHONE ENCOUNTER
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 He \"leaned one way and the ladder went the other way\".  Landed on the lower cabinetry framing structure (no counter top in place)  He did not hit his head or lose consciousness.  Pain is in upper left chest near armpit. \"I think it's a contusion.\" no pain with deep inspiration, but \"hurts like crazy when coughing\". No limb abnormalities.  Continued to work for awhile after incident.    I advised [facility] urgent care due to the time of day AND his current location.  He will transport self there now.    Saba Ramos RN    "

## 2017-12-11 NOTE — LETTER
[address] [address]  [phone]        2017    REPORT OF WORK ABILITY    PATIENT DATA  Employee Name: Chrystal Solis        : 1950   [id number]  Work related injury: YES  Today's date: 2017  Date of injury: 2017     PROVIDER EVALUATION: Please fill in as needed.  Please give copy to employee for employer.  1. Diagnosis: Right wrist injury, left rib fractures, 6th and 7th ribs  2. Treatment: Medications, rib belt, right wrist brace  3. Medication: percocet  NOTE: When ordering a medication, [place] Rules require Work Comp or WC on prescriptions.  4. Return to work date: no work  - 12/15 due pain control      RESTRICTIONS: Unlimited unless listed.  Restrictions apply to home and leisure also.  If work within restrictions is not available, the employee is totally disabled.  Provider comments:   Medical Examiner: Martín Gary [facility] PAC  License or registration:     Next appointment: next week with Occupational medicine    CC: Employer, Managed Care Plan/Payor, Patient

## 2017-12-11 NOTE — MR AVS SNAPSHOT
After Visit Summary   12/11/2017    Chrystal Solis    MRN: [id number]           Patient Information     Date Of Birth          1950        Visit Information        Provider Department      12/11/2017 3:30 PM Martín Gary PA-C [facility]        Today's Diagnoses     Fall, initial encounter    -  1    Wrist injury, right, initial encounter        Closed fracture of multiple ribs of left side, initial encounter           Follow-ups after your visit        Your next 10 appointments already scheduled     Dec 18, 2017  2:00 PM CST   [facility]    [address] [facility] [address] [phone]           Bring a current list of meds and any records pertaining to this visit. For Physicals, please bring immunization records and any forms needing to be filled out. Please arrive 10 minutes early to complete paperwork.              Who to contact     If you have questions or need follow up information about today's clinic visit or your schedule please contact [facility] directly at [phone].  Normal or non-critical lab and imaging results will be communicated to you by LumiFoldhart, letter or phone within 4 business days after the clinic has received the results. If you do not hear from us within 7 days, please contact the clinic through LumiFoldhart or phone. If you have a critical or abnormal lab result, we will notify you by phone as soon as possible.  Submit refill requests through SoundSenasation or call your pharmacy and they will forward the refill request to us. Please allow 3 business days for your refill to be completed.          Additional Information About Your Visit        LumiFoldharAir2Web Information     SoundSenasation lets you send messages to your doctor, view your test results, renew your prescriptions, schedule appointments and more. To sign up, go to  [email or web address] . Click on \"Log in\" on the left side of the screen, which will take you to the Welcome page. Then click on \"Sign up Now\" on the right side of the page.     You will be asked to enter the access code listed below, as well as some personal information. Please follow the directions to create your username and password.     Your access code is: [email or web address]  Expires: 3/15/2018  1:14 PM     Your access code will  in 90 days. If you need help or a new code, please call your [place] clinic or [phone].        Care EveryWhere ID     This is your Care EveryWhere ID. This could be used by other organizations to access your [place] medical records  [id number]        Your Vitals Were     Pulse Respirations Pulse Oximetry BMI (Body Mass Index)          95 20 96% 34.45 kg/m2         Blood Pressure from Last 3 Encounters:   17 156/80   17 132/73   17 123/75    Weight from Last 3 Encounters:   17 254 lb (115.2 kg)   17 252 lb (114.3 kg)   17 246 lb 6.4 oz (111.8 kg)                 Today's Medication Changes          These changes are accurate as of: 17 11:59 PM.  If you have any questions, ask your nurse or doctor.               Start taking these medicines.        Dose/Directions    * order for DME   Used for:  Closed fracture of multiple ribs of left side, initial encounter, Wrist injury, right, initial encounter   Started by:  Martín Gary PA-C        Rib belt   Quantity:  1 Device   Refills:  0       * order for DME   Used for:  Closed fracture of multiple ribs of left side, initial encounter   Started by:  Martín Gary PA-C        Equipment being ordered: right thumb spica splint   Quantity:  1 Device   Refills:  0       oxyCODONE-acetaminophen 5-325 MG per tablet   Commonly known as:  PERCOCET   Used for:  Wrist injury, right, initial encounter, Closed fracture of multiple ribs of left side, initial encounter   Started by:  Martín Gary" B, PA-C        Dose:  1 tablet   Take 1 tablet by mouth every 6 hours as needed for pain   Quantity:  20 tablet   Refills:  0       * Notice:  This list has 2 medication(s) that are the same as other medications prescribed for you. Read the directions carefully, and ask your doctor or other care provider to review them with you.         Where to get your medicines      Some of these will need a paper prescription and others can be bought over the counter.  Ask your nurse if you have questions.     Bring a paper prescription for each of these medications     order for DME    order for DME    oxyCODONE-acetaminophen 5-325 MG per tablet                Primary Care Provider Office Phone # Fax #    Lam Rodríguez [phone]       [place] [address] [address]        Equal Access to Services     [employer] : Hadii choco [place] hadasho Soomaali, waaxda luqadaha, qaybta kaalmada adeegyada, cory granda . So [facility] [phone].    ATENCIÓN: Si habla español, tiene a sanchez disposición servicios gratuitos de asistencia lingüística. Llame al [phone].    We comply with applicable federal civil rights laws and [place] laws. We do not discriminate on the basis of race, color, national origin, age, disability, sex, sexual orientation, or gender identity.            Thank you!     Thank you for choosing [facility]  for your care. Our goal is always to provide you with excellent care. Hearing back from our patients is one way we can continue to improve our services. Please take a few minutes to complete the written survey that you may receive in the mail after your visit with us. Thank you!             Your Updated Medication List - Protect others around you: Learn how to safely use, store and throw away your medicines at www.disposemymeds.org.          This list is accurate as of: 12/11/17 11:59 PM.  Always use your most  recent med list.                   Brand Name Dispense Instructions for use Diagnosis    amLODIPine 10 MG tablet    NORVASC    90 tablet    Take 1 tablet (10 mg) by mouth daily    Benign essential hypertension       atorvastatin 10 MG tablet    LIPITOR    90 tablet    Take 1 tablet (10 mg) by mouth daily    Hyperlipidemia LDL goal <130       hydrochlorothiazide 25 MG tablet    HYDRODIURIL    90 tablet    Take 1 tablet (25 mg) by mouth as needed    Benign essential hypertension       irbesartan 150 MG tablet    AVAPRO    180 tablet    Take 2 tablets (300 mg) by mouth daily    Benign essential hypertension       * order for DME     1 Device    Rib belt    Closed fracture of multiple ribs of left side, initial encounter, Wrist injury, right, initial encounter       * order for DME     1 Device    Equipment being ordered: right thumb spica splint    Closed fracture of multiple ribs of left side, initial encounter       oxyCODONE-acetaminophen 5-325 MG per tablet    PERCOCET    20 tablet    Take 1 tablet by mouth every 6 hours as needed for pain    Wrist injury, right, initial encounter, Closed fracture of multiple ribs of left side, initial encounter       * Notice:  This list has 2 medication(s) that are the same as other medications prescribed for you. Read the directions carefully, and ask your doctor or other care provider to review them with you.

## 2017-12-15 ENCOUNTER — TELEPHONE (OUTPATIENT)
Dept: FAMILY MEDICINE | Facility: CLINIC | Age: 67
End: 2017-12-15

## 2017-12-15 DIAGNOSIS — S69.91XA WRIST INJURY, RIGHT, INITIAL ENCOUNTER: ICD-10-CM

## 2017-12-15 DIAGNOSIS — S22.42XA CLOSED FRACTURE OF MULTIPLE RIBS OF LEFT SIDE, INITIAL ENCOUNTER: ICD-10-CM

## 2017-12-15 RX ORDER — OXYCODONE AND ACETAMINOPHEN 5; 325 MG/1; MG/1
1 TABLET ORAL EVERY 6 HOURS PRN
Qty: 20 TABLET | Refills: 0 | Status: SHIPPED | OUTPATIENT
Start: 2017-12-15 | End: 2017-12-18

## 2017-12-15 NOTE — TELEPHONE ENCOUNTER
Reason for call:  Patient reporting a symptom    Symptom or request: patient was seen at [facility] on 12/11 for WC injury and was diagnosed with broken ribs. He wants to see Dr Rodríguez to follow up on this, first opening that is not on hold is 1/4.   In the meantime He said he is not having good pain control and mike like to speak to a nurse.     Duration (how long have symptoms been present): 12/11    Have you been treated for this before?  Went to  12/11    Additional comments:     Phone Number patient can be reached at:  Cell number on file:    Telephone Information:   Mobile [phone]       Best Time:  any    Can we leave a detailed message on this number:  YES    Call taken on 12/15/2017 at 9:24 AM by Lilia Brennan

## 2017-12-15 NOTE — TELEPHONE ENCOUNTER
TO PCP:  Patient was seen in  on 12/11 for a fall at work.  Has Fractured Ribs.  Very painful especially with movement.  He was given 20 tabs of Percocet 5-325 on 12/11.  Percocet is lasting about 3 hours with 1.5 tabs for pain control.  He is also alternating with ibuprofen.  Patient is hoping for refill or different medication to help with pain.  He has a follow up appt with you on Monday 12/18.  Patient's wife is also an ER nurse and has educated patient of Rib fractures, deep breathing, etc.  Please advise.  Thank you.  Khloe Pineda, RN

## 2017-12-15 NOTE — PROGRESS NOTES
SUBJECTIVE:  Chief Complaint   Patient presents with     Rib Pain     fell aprox 2 feet off a ladder at work and hit lt side of chest     Work Comp     Chrystal Solis is a 67 year old male presents with a chief complaint of left side rib injury, pain and right wrist injury .  The injury occurred 2 hours(s) ago.   The injury happened while at work. How: fall from ladder.  The patient complained of moderate pain  and has had decreased ROM.  Pain exacerbated by movement.  Relieved by rest.  He treated it initially with no therapy. This is the first time this type of injury has occurred to this patient.     Past Medical History:   Diagnosis Date     Benign essential hypertension 1/11/2017     Bronchitis      Hearing loss, mixed, bilateral      Hyperlipidemia LDL goal <130 1/11/2017     Hypertension      Intervertebral cervical disc disorder with myelopathy, cervical region 2006    had injections     Malignant neoplasm (H) 12/6/10    squamous cell of tongue      Numbness and tingling     numbness in toes     Peripheral polyneuropathy 1/11/2017     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     No Known Allergies  Social History   Substance Use Topics     Smoking status: Former Smoker     Packs/day: 0.25     Years: 2.00     Types: Cigarettes     Quit date: 1/1/1978     Smokeless tobacco: Never Used      Comment: smoked socially in college     Alcohol use 0.0 oz/week     0 Standard drinks or equivalent per week      Comment: 4-5 drinks a week       ROS:  CONSTITUTIONAL:NEGATIVE for fever, chills, change in weight  INTEGUMENTARY/SKIN: NEGATIVE for worrisome rashes, moles or lesions  RESP:NEGATIVE for significant cough or SOB  CV: NEGATIVE for chest pain, palpitations or peripheral edema  GI: NEGATIVE for nausea, abdominal pain, heartburn, or change in bowel habits  MUSCULOSKELETAL: POSITIVE  for left side rib and right wrist injuries\  VASC: Negative for cold extremities  NEURO: NEGATIVE for weakness, dizziness or  paresthesias    EXAM:   /80 (Cuff Size: Adult Large)  Pulse 95  Resp 20  Wt 254 lb (115.2 kg)  SpO2 96%  BMI 34.45 kg/m2  Gen: healthy,alert,no distress  Extremity: decreased DROM of right wrist has point tenderness.   There is not compromise to the distal circulation.  Pulses are +2 and CRT is brisk  GENERAL APPEARANCE: healthy, alert and no distress  NECK: supple, non-tender to palpation, FROM   CHEST: clear to auscultation  CV: regular rate and rhythm  EXTREMITIES: peripheral pulses normal  MS:  Positive for left side rib and right wrist tenderness, pain  SKIN: no suspicious lesions or rashes  NEURO: Normal strength and tone, sensory exam grossly normal, mentation intact and speech normal    X-RAY Positive for 2 left side rib fractures Xray read by Martín Gary at time of visit    ASSESSMENT/PLAN      ICD-10-CM    1. Fall, initial encounter W19.XXXA XR Wrist Right G/E 3 Views   2. Wrist injury, right, initial encounter S69.91XA XR Wrist Right G/E 3 Views     order for DME     : oxyCODONE-acetaminophen (PERCOCET) 5-325 MG per tablet   3. Closed fracture of multiple ribs of left side, initial encounter S22.42XA XR Ribs & Chest Left G/E 3 Views     XR Wrist Right G/E 3 Views     order for DME     order for DME      oxyCODONE-acetaminophen (PERCOCET) 5-325 MG per tablet       RICE treatment: Rest, Ice, compression, elevation   Wear rib belt for comfort  Wear right wrist brace for comfort  Advised to follow up with occupational medicine, info given  No work as per note

## 2017-12-18 ENCOUNTER — OFFICE VISIT (OUTPATIENT)
Dept: FAMILY MEDICINE | Facility: CLINIC | Age: 67
End: 2017-12-18
Payer: OTHER MISCELLANEOUS

## 2017-12-18 VITALS
DIASTOLIC BLOOD PRESSURE: 84 MMHG | BODY MASS INDEX: 34.9 KG/M2 | TEMPERATURE: 98.2 F | WEIGHT: 257.7 LBS | OXYGEN SATURATION: 95 % | SYSTOLIC BLOOD PRESSURE: 146 MMHG | HEIGHT: 72 IN | HEART RATE: 70 BPM

## 2017-12-18 DIAGNOSIS — S22.42XD CLOSED FRACTURE OF MULTIPLE RIBS OF LEFT SIDE WITH ROUTINE HEALING, SUBSEQUENT ENCOUNTER: Primary | ICD-10-CM

## 2017-12-18 DIAGNOSIS — Z71.89 ADVANCED DIRECTIVES, COUNSELING/DISCUSSION: ICD-10-CM

## 2017-12-18 PROCEDURE — 99214 OFFICE O/P EST MOD 30 MIN: CPT | Performed by: INTERNAL MEDICINE

## 2017-12-18 RX ORDER — OXYCODONE HYDROCHLORIDE 5 MG/1
5-10 TABLET ORAL
Qty: 30 TABLET | Refills: 0 | Status: SHIPPED | OUTPATIENT
Start: 2017-12-18 | End: 2018-01-05

## 2017-12-18 NOTE — LETTER
To Whom it May Concern:      Chrystal Solis sustained a serious injury of 12/11/17.  I evaluated him on 12/18/17.  In my opinion, he will not be able to return to work until 1/8/18 due to this injury.      Sincerely,    Lam Rodríguez

## 2017-12-18 NOTE — MR AVS SNAPSHOT
After Visit Summary   12/18/2017    Chrystal Solis    MRN: [id number]           Patient Information     Date Of Birth          1950        Visit Information        Provider Department      12/18/2017 2:00 PM Lam Rodríguez MD [facility]        Today's Diagnoses     Closed fracture of multiple ribs of left side with routine healing, subsequent encounter    -  1    Advanced directives, counseling/discussion          Care Instructions    For your rib fracture pain you may safely use over the counter Tylenol (acetaminophen).  You make take 500-1000mg up to three times per day as needed for pain.   I would recommend that you take 1000 mg with breakfast, lunch and dinner to keep the pain at bay.      If the Tylenol does not manage the pain adequately, you may add ibuprofen 600 mg up to every 8 hours as needed for break- through pain.  You should take ibuprofen with food.  The risk of ibuprofen is that it can thin the lining of your stomach and cause stomach ulcers and bleeding, so take ibuprofen judiciously.    Finally, at night when rib fractures can hurt the most, you may use oxycodone to get comfortable.  Oxycodone is a potentially addictive opioid analgesic and can be habit forming and you can overdose on oxycodone if you take too much.  For the next few days, you may need to take 1-2 oxycodone at bedtime as needed to get comfortable.  Hopefully, you will not need to take this medication for too long. You should not drive after taking oxycodone.      Applying heat to your painful chest can be very helpful to relax spastic muscles associated with the fracture.      Your work will require you to return to see me in January to determine if you are fit to return to duty.  Please schedule that appointment today as you leave.              Follow-ups after your visit        Follow-up notes from your care team     Return in 3 weeks (on 1/8/2018).      Who to contact     If you have  "questions or need follow up information about today's clinic visit or your schedule please contact [facility] directly at [phone].  Normal or non-critical lab and imaging results will be communicated to you by MyChart, letter or phone within 4 business days after the clinic has received the results. If you do not hear from us within 7 days, please contact the clinic through [facility] [employer] or phone. If you have a critical or abnormal lab result, we will notify you by phone as soon as possible.  Submit refill requests through [employer] or call your pharmacy and they will forward the refill request to us. Please allow 3 business days for your refill to be completed.          Additional Information About Your Visit        [facility] Information     [employer] lets you send messages to your doctor, view your test results, renew your prescriptions, schedule appointments and more. To sign up, go to [email or web address] . Click on \"Log in\" on the left side of the screen, which will take you to the Welcome page. Then click on \"Sign up Now\" on the right side of the page.     You will be asked to enter the access code listed below, as well as some personal information. Please follow the directions to create your username and password.     Your access code is: [email or web address]  Expires: 3/15/2018  1:14 PM     Your access code will  in 90 days. If you need help or a new code, please call your [place] clinic or [phone].        Care EveryWhere ID     This is your Care EveryWhere ID. This could be used by other organizations to access your [place] medical records  [id number]        Your Vitals Were     Pulse Temperature Height Pulse Oximetry BMI (Body Mass Index)       70 98.2  F (36.8  C) (Oral) 6' (1.829 m) 95% 34.95 kg/m2        Blood Pressure from Last 3 Encounters:   17 146/84   17 156/80   17 132/73    Weight from Last 3 Encounters:   17 257 lb 11.2 oz (116.9 kg)   17 254 lb " (115.2 kg)   02/09/17 252 lb (114.3 kg)              Today, you had the following     No orders found for display         Today's Medication Changes          These changes are accurate as of: 12/18/17  2:42 PM.  If you have any questions, ask your nurse or doctor.               Start taking these medicines.        Dose/Directions    oxyCODONE IR 5 MG tablet   Commonly known as:  ROXICODONE   Used for:  Closed fracture of multiple ribs of left side with routine healing, subsequent encounter   Started by:  Lam Rodríguez MD        Dose:  5-10 mg   Take 1-2 tablets (5-10 mg) by mouth nightly as needed for pain maximum 3 tablet(s) per day   Quantity:  30 tablet   Refills:  0         Stop taking these medicines if you haven't already. Please contact your care team if you have questions.     oxyCODONE-acetaminophen 5-325 MG per tablet   Commonly known as:  PERCOCET   Stopped by:  Lam Rodríguez MD                Where to get your medicines      Some of these will need a paper prescription and others can be bought over the counter.  Ask your nurse if you have questions.     Bring a paper prescription for each of these medications     oxyCODONE IR 5 MG tablet                Primary Care Provider Office Phone # Fax #    Lam Rodríguez [phone]       [place] [address] [address]        Equal Access to Services     [facility]: Hadii aad ku hadasho Soomaali, waaxda luqadaha, qaybta kaalmada adeegyada, waxay elisha haycassia granda . So [facility] [phone].    ATENCIÓN: Si habla español, tiene a sanchez disposición servicios gratuitos de asistencia lingüística. Llame al [phone].    We comply with applicable federal civil rights laws and [place] laws. We do not discriminate on the basis of race, color, national origin, age, disability, sex, sexual orientation, or gender identity.            Thank you!     Thank you for choosing [facility]  for  your care. Our goal is always to provide you with excellent care. Hearing back from our patients is one way we can continue to improve our services. Please take a few minutes to complete the written survey that you may receive in the mail after your visit with us. Thank you!             Your Updated Medication List - Protect others around you: Learn how to safely use, store and throw away your medicines at www.disposemymeds.org.          This list is accurate as of: 12/18/17  2:42 PM.  Always use your most recent med list.                   Brand Name Dispense Instructions for use Diagnosis    amLODIPine 10 MG tablet    NORVASC    90 tablet    Take 1 tablet (10 mg) by mouth daily    Benign essential hypertension       atorvastatin 10 MG tablet    LIPITOR    90 tablet    Take 1 tablet (10 mg) by mouth daily    Hyperlipidemia LDL goal <130       hydrochlorothiazide 25 MG tablet    HYDRODIURIL    90 tablet    Take 1 tablet (25 mg) by mouth as needed    Benign essential hypertension       irbesartan 150 MG tablet    AVAPRO    180 tablet    Take 2 tablets (300 mg) by mouth daily    Benign essential hypertension       * order for DME     1 Device    Rib belt    Closed fracture of multiple ribs of left side, initial encounter, Wrist injury, right, initial encounter       * order for DME     1 Device    Equipment being ordered: right thumb spica splint    Closed fracture of multiple ribs of left side, initial encounter       oxyCODONE IR 5 MG tablet    ROXICODONE    30 tablet    Take 1-2 tablets (5-10 mg) by mouth nightly as needed for pain maximum 3 tablet(s) per day    Closed fracture of multiple ribs of left side with routine healing, subsequent encounter       * Notice:  This list has 2 medication(s) that are the same as other medications prescribed for you. Read the directions carefully, and ask your doctor or other care provider to review them with you.

## 2017-12-18 NOTE — PROGRESS NOTES
SUBJECTIVE:   Chrystal Solis is a 67 year old male who presents to clinic today for the following health issues:    Recheck : Fracture of multiple ribs of left side - pain level 7/10 constant - when coughing  Pain level goes up to   8- 8 1/2 / 10 - discomfort - Denies SOB     67-year-old  who fell and struck his left flank on December 11 and sustained multiple rib fractures.  He describes severe pain since the injury.  He has not been able to return to work.  He has been taking Percocet to manage pain.  However, 5 mg of oxycodone contained within the Percocet has not been sufficient to manage his pain at night.  He states that he may have some tolerance to opioid analgesics since he took high doses of opioids when he was treated for tongue cancer several years ago.  He has a very physical job, and does not think that he will be able to return to his work for an additional 3 weeks.  He has extreme pain associated with lifting his left arm or moving or twisting his upper body.  However, he does not have shortness of breath, cough or fever.    Problem list and histories reviewed & adjusted, as indicated.  Additional history: as documented    Patient Active Problem List   Diagnosis     Squamous cell carcinoma of tongue (H)     Benign essential hypertension     Hyperlipidemia LDL goal <130     Peripheral polyneuropathy     Lipoma of skin and subcutaneous tissue     Advanced directives, counseling/discussion     Past Surgical History:   Procedure Laterality Date     BIOPSY       COLONOSCOPY  3/2009     COLONOSCOPY N/A 3/8/2017    Procedure: COMBINED COLONOSCOPY, SINGLE OR MULTIPLE BIOPSY/POLYPECTOMY BY BIOPSY;  Surgeon: Yung Maguire MD;  Location:  GI     ENT SURGERY       EXCISE LESION TRUNK N/A 2/3/2017    Procedure: EXCISE LESION TRUNK;  Surgeon: Jaswinder Lopez MD;  Location: [facility]     EXCISE LESION UPPER EXTREMITY Left 2/3/2017    Procedure: EXCISE LESION UPPER EXTREMITY;   Surgeon: Jaswinder Lopez MD;  [facility]: [facility]     HEAD & NECK SURGERY       HERNIA REPAIR Left 2002     ORTHOPEDIC SURGERY  2002    torn ligament right shoulder     SOFT TISSUE SURGERY  12/6/10    excisional biopsy of tongue lesion     tonsillectomy  Age 6       Social History   Substance Use Topics     Smoking status: Former Smoker     Packs/day: 0.25     Years: 2.00     Types: Cigarettes     Quit date: 1/1/1978     Smokeless tobacco: Never Used      Comment: smoked socially in college     Alcohol use 0.0 oz/week     0 Standard drinks or equivalent per week      Comment: 4-5 drinks a week     Family History   Problem Relation Age of Onset     CEREBROVASCULAR DISEASE Mother      Hypertension Mother      Respiratory Father      heavy smoker     CANCER Maternal Grandfather      lip cancer--heavy smoker     Unknown/Adopted Son      Unknown/Adopted Daughter          Current Outpatient Prescriptions   Medication Sig Dispense Refill     oxyCODONE IR (ROXICODONE) 5 MG tablet Take 1-2 tablets (5-10 mg) by mouth nightly as needed for pain maximum 3 tablet(s) per day 30 tablet 0     order for DME Rib belt 1 Device 0     order for DME Equipment being ordered: right thumb spica splint 1 Device 0     irbesartan (AVAPRO) 150 MG tablet Take 2 tablets (300 mg) by mouth daily 180 tablet 0     hydrochlorothiazide (HYDRODIURIL) 25 MG tablet Take 1 tablet (25 mg) by mouth as needed 90 tablet 3     amLODIPine (NORVASC) 10 MG tablet Take 1 tablet (10 mg) by mouth daily 90 tablet 3     atorvastatin (LIPITOR) 10 MG tablet Take 1 tablet (10 mg) by mouth daily 90 tablet 3     No Known Allergies      Reviewed and updated as needed this visit by clinical staff       Reviewed and updated as needed this visit by Provider         ROS:  Constitutional, HEENT, cardiovascular, pulmonary, gi and gu systems are negative, except as otherwise noted.      OBJECTIVE:   /84 (BP Location: Right arm, Patient Position: Chair, Cuff Size: Adult  Large)  Pulse 70  Temp 98.2  F (36.8  C) (Oral)  Ht 6' (1.829 m)  Wt 257 lb 11.2 oz (116.9 kg)  SpO2 95%  BMI 34.95 kg/m2  Body mass index is 34.95 kg/(m^2).  General: This is a mildly uncomfortable appearing middle-aged man in no acute distress.  He speaks in full sentences.  He does grimace in pain when he has to move his upper body or his left arm.  Pulmonary: The lungs are clear to auscultation bilaterally, no use of accessory muscles.  There is severe tenderness to palpation over the lateral ribs on the left side.  There is no bruising overlying the area of tenderness.  There is no crepitus or areas of flail segments noted.    Diagnostic Test Results:  Results for orders placed or performed in visit on 12/11/17   XR Wrist Right G/E 3 Views    Narrative    RIGHT WRIST THREE OR MORE VIEWS   12/11/2017 5:08 PM     HISTORY: Fall, initial encounter. Wrist injury, right, initial  encounter. Closed fracture of multiple ribs of left side, initial  encounter.    COMPARISON: None.      Impression    IMPRESSION: Degenerative change is seen in the medial carpal joints as  well as the first carpometacarpal joint. There is no evidence for  fracture or dislocation.    ABDIEL MORIN MD     Chest x-ray shows recent fractures of the left sixth and seventh rib  ASSESSMENT/PLAN:             1. Closed fracture of multiple ribs of left side with routine healing, subsequent encounter  Refer to patient instructions  - oxyCODONE IR (ROXICODONE) 5 MG tablet; Take 1-2 tablets (5-10 mg) by mouth nightly as needed for pain maximum 3 tablet(s) per day  Dispense: 30 tablet; Refill: 0    2. Advanced directives, counseling/discussion        Patient Instructions   For your rib fracture pain you may safely use over the counter Tylenol (acetaminophen).  You make take 500-1000mg up to three times per day as needed for pain.   I would recommend that you take 1000 mg with breakfast, lunch and dinner to keep the pain at bay.      If the Tylenol  does not manage the pain adequately, you may add ibuprofen 600 mg up to every 8 hours as needed for break- through pain.  You should take ibuprofen with food.  The risk of ibuprofen is that it can thin the lining of your stomach and cause stomach ulcers and bleeding, so take ibuprofen judiciously.    Finally, at night when rib fractures can hurt the most, you may use oxycodone to get comfortable.  Oxycodone is a potentially addictive opioid analgesic and can be habit forming and you can overdose on oxycodone if you take too much.  For the next few days, you may need to take 1-2 oxycodone at bedtime as needed to get comfortable.  Hopefully, you will not need to take this medication for too long. You should not drive after taking oxycodone.      Applying heat to your painful chest can be very helpful to relax spastic muscles associated with the fracture.      Your work will require you to return to see me in January to determine if you are fit to return to duty.  Please schedule that appointment today as you leave.        The patient had numerous questions about returning to work and pain management strategies, I tried to answer his questions to the best of my ability, he also questions about pain in his left wrist that had preceded the injury.  I recommended that he see Dr. Dodson from hand surgery regarding the symptoms.  Total time was more than 25 minutes the majority of which was spent counseling and coordinating care.  He also had concerns about his blood pressure which was mildly elevated today.  His blood pressure was well controlled at his physical less than 1 year ago.  Also when he had his colonoscopy.  I suspect that his blood pressure elevation today is related to acute pain.  We will monitor his blood pressure when he returns in a several weeks to follow-up on his rib fracture pain.      Lam Rodríguez MD  [facility]

## 2017-12-18 NOTE — NURSING NOTE
Chief Complaint   Patient presents with     RECHECK     Rib fracture        Initial /84 (BP Location: Right arm, Patient Position: Chair, Cuff Size: Adult Large)  Pulse 70  Temp 98.2  F (36.8  C) (Oral)  Ht 6' (1.829 m)  Wt 257 lb 11.2 oz (116.9 kg)  SpO2 95%  BMI 34.95 kg/m2 Estimated body mass index is 34.95 kg/(m^2) as calculated from the following:    Height as of this encounter: 6' (1.829 m).    Weight as of this encounter: 257 lb 11.2 oz (116.9 kg).  Medication Reconciliation: complete     Naomi Saenz MA

## 2017-12-18 NOTE — PATIENT INSTRUCTIONS
For your rib fracture pain you may safely use over the counter Tylenol (acetaminophen).  You make take 500-1000mg up to three times per day as needed for pain.   I would recommend that you take 1000 mg with breakfast, lunch and dinner to keep the pain at bay.      If the Tylenol does not manage the pain adequately, you may add ibuprofen 600 mg up to every 8 hours as needed for break- through pain.  You should take ibuprofen with food.  The risk of ibuprofen is that it can thin the lining of your stomach and cause stomach ulcers and bleeding, so take ibuprofen judiciously.    Finally, at night when rib fractures can hurt the most, you may use oxycodone to get comfortable.  Oxycodone is a potentially addictive opioid analgesic and can be habit forming and you can overdose on oxycodone if you take too much.  For the next few days, you may need to take 1-2 oxycodone at bedtime as needed to get comfortable.  Hopefully, you will not need to take this medication for too long. You should not drive after taking oxycodone.      Applying heat to your painful chest can be very helpful to relax spastic muscles associated with the fracture.      Your work will require you to return to see me in January to determine if you are fit to return to duty.  Please schedule that appointment today as you leave.

## 2017-12-29 ENCOUNTER — TELEPHONE (OUTPATIENT)
Dept: FAMILY MEDICINE | Facility: CLINIC | Age: 67
End: 2017-12-29

## 2017-12-29 NOTE — TELEPHONE ENCOUNTER
Reason for Call:  Other Appointment    Detailed comments: Pt has note for work until January 8th. Pt needs to be seen before then in order to determine whether or not he should return to work after the 8th.    Phone Number Patient can be reached at: Home number on file [phone] (home)    Best Time: Anytime    Can we leave a detailed message on this number? YES    Call taken on 12/29/2017 at 1:42 PM by Radha Lambert

## 2017-12-29 NOTE — TELEPHONE ENCOUNTER
I am not sure what is being asked of me in this message  I can see him in a same day slot or at the end of my schedule some day next week if needed?

## 2017-12-29 NOTE — TELEPHONE ENCOUNTER
Routing to team as well because pt would like Dr. Rodríguez to help him decide what he should do next if he is not able to be seen sooner.

## 2018-01-05 ENCOUNTER — OFFICE VISIT (OUTPATIENT)
Dept: FAMILY MEDICINE | Facility: CLINIC | Age: 68
End: 2018-01-05
Payer: OTHER MISCELLANEOUS

## 2018-01-05 VITALS
BODY MASS INDEX: 32.84 KG/M2 | HEART RATE: 99 BPM | HEIGHT: 73 IN | DIASTOLIC BLOOD PRESSURE: 88 MMHG | WEIGHT: 247.8 LBS | OXYGEN SATURATION: 97 % | SYSTOLIC BLOOD PRESSURE: 138 MMHG | TEMPERATURE: 98 F

## 2018-01-05 DIAGNOSIS — S22.42XD CLOSED FRACTURE OF MULTIPLE RIBS OF LEFT SIDE WITH ROUTINE HEALING, SUBSEQUENT ENCOUNTER: Primary | ICD-10-CM

## 2018-01-05 DIAGNOSIS — I10 BENIGN ESSENTIAL HYPERTENSION: ICD-10-CM

## 2018-01-05 DIAGNOSIS — M25.532 LEFT WRIST PAIN: ICD-10-CM

## 2018-01-05 PROCEDURE — 99213 OFFICE O/P EST LOW 20 MIN: CPT | Performed by: INTERNAL MEDICINE

## 2018-01-05 RX ORDER — OXYCODONE HYDROCHLORIDE 5 MG/1
5-10 TABLET ORAL
Qty: 30 TABLET | Refills: 0 | Status: SHIPPED | OUTPATIENT
Start: 2018-01-05 | End: 2018-04-12

## 2018-01-05 NOTE — NURSING NOTE
"Chief Complaint   Patient presents with     Follow Up For     Rib fracture, RTW note       Initial BP (!) 144/92  Pulse 99  Temp 98  F (36.7  C) (Oral)  Ht 6' 0.5\" (1.842 m)  Wt 247 lb 12.8 oz (112.4 kg)  SpO2 97%  BMI 33.15 kg/m2 Estimated body mass index is 33.15 kg/(m^2) as calculated from the following:    Height as of this encounter: 6' 0.5\" (1.842 m).    Weight as of this encounter: 247 lb 12.8 oz (112.4 kg).  Medication Reconciliation: complete   Nuvia Sunshine MA  "

## 2018-01-05 NOTE — PROGRESS NOTES
SUBJECTIVE:   Chrystal Solis is a 67 year old male who presents to clinic today for the following health issues:      F/up rib fracture  RTW note    Overall, pain seems to be improving  Less pain in anterior chest associated with laughing or coughing   He continues to use oxycodone on an intermittent basis  He is taking APAP regularly  He has had to stop taking NSAIDs due to mild stomach upset associated with taking those medication   He also injured his right wrist during the fall and has an appointment to see Dr. Dodson 1/12/18  He admits to remote previous right wrist injury, but he thinks his wrist has gotten worse since his recent fall and pain prevents him from lifting with his right arm   He does not wish to return to work until he gets her input as to whether he needs any intervention for his wrist  He is concerned that his blood pressure is elevated       Problem list and histories reviewed & adjusted, as indicated.  Additional history: as documented    Patient Active Problem List   Diagnosis     Squamous cell carcinoma of tongue (H)     Benign essential hypertension     Hyperlipidemia LDL goal <130     Peripheral polyneuropathy     Lipoma of skin and subcutaneous tissue     Advanced directives, counseling/discussion     Past Surgical History:   Procedure Laterality Date     BIOPSY       COLONOSCOPY  3/2009     COLONOSCOPY N/A 3/8/2017    Procedure: COMBINED COLONOSCOPY, SINGLE OR MULTIPLE BIOPSY/POLYPECTOMY BY BIOPSY;  Surgeon: Yung Maguire MD;  Location:  GI     ENT SURGERY       EXCISE LESION TRUNK N/A 2/3/2017    Procedure: EXCISE LESION TRUNK;  Surgeon: Jaswinder Lopez MD;  Location: [facility]     EXCISE LESION UPPER EXTREMITY Left 2/3/2017    Procedure: EXCISE LESION UPPER EXTREMITY;  Surgeon: Jaswinder Lopez MD;  Location: [facility]     HEAD & NECK SURGERY       HERNIA REPAIR Left 2002     ORTHOPEDIC SURGERY  2002    torn ligament right shoulder     SOFT TISSUE SURGERY  12/6/10     "excisional biopsy of tongue lesion     tonsillectomy  Age 6       Social History   Substance Use Topics     Smoking status: Former Smoker     Packs/day: 0.25     Years: 2.00     Types: Cigarettes     Quit date: 1/1/1978     Smokeless tobacco: Never Used      Comment: smoked socially in college     Alcohol use 0.0 oz/week     0 Standard drinks or equivalent per week      Comment: 3-4 drinks per week     Family History   Problem Relation Age of Onset     CEREBROVASCULAR DISEASE Mother      Hypertension Mother      Respiratory Father      heavy smoker     CANCER Maternal Grandfather      lip cancer--heavy smoker     Unknown/Adopted Son      Unknown/Adopted Daughter          Current Outpatient Prescriptions   Medication Sig Dispense Refill     oxyCODONE IR (ROXICODONE) 5 MG tablet Take 1-2 tablets (5-10 mg) by mouth nightly as needed for pain maximum 3 tablet(s) per day 30 tablet 0     order for DME Rib belt 1 Device 0     order for DME Equipment being ordered: right thumb spica splint 1 Device 0     irbesartan (AVAPRO) 150 MG tablet Take 2 tablets (300 mg) by mouth daily 180 tablet 0     hydrochlorothiazide (HYDRODIURIL) 25 MG tablet Take 1 tablet (25 mg) by mouth as needed 90 tablet 3     amLODIPine (NORVASC) 10 MG tablet Take 1 tablet (10 mg) by mouth daily 90 tablet 3     atorvastatin (LIPITOR) 10 MG tablet Take 1 tablet (10 mg) by mouth daily 90 tablet 3     No Known Allergies      Reviewed and updated as needed this visit by clinical staffTobacco  Allergies  Soc Hx      Reviewed and updated as needed this visit by Provider         ROS:  Constitutional, HEENT, cardiovascular, pulmonary, gi and gu systems are negative, except as otherwise noted.      OBJECTIVE:   /88  Pulse 99  Temp 98  F (36.7  C) (Oral)  Ht 6' 0.5\" (1.842 m)  Wt 247 lb 12.8 oz (112.4 kg)  SpO2 97%  BMI 33.15 kg/m2  Body mass index is 33.15 kg/(m^2).  GENERAL: healthy, alert and no distress  RESP: lungs clear to auscultation - no " rales, rhonchi or wheezes; mild posterior rib tenderness, no flail segments or crepitus, no T spine tenderness   CV: Heart with regular rate and rhythm.   ABDOMEN: soft, nontender, no hepatosplenomegaly, no masses and bowel sounds normal  MS: No bony left wrist tenderness, no edema noted, good  strength, no snuff box tenderness   NEURO: Normal strength and tone, mentation intact and speech normal  PSYCH: Very anxious affect, anxious mood, well groomed     Diagnostic Test Results:  Results for orders placed or performed in visit on 12/11/17   XR Wrist Right G/E 3 Views    Narrative    RIGHT WRIST THREE OR MORE VIEWS   12/11/2017 5:08 PM     HISTORY: Fall, initial encounter. Wrist injury, right, initial  encounter. Closed fracture of multiple ribs of left side, initial  encounter.    COMPARISON: None.      Impression    IMPRESSION: Degenerative change is seen in the medial carpal joints as  well as the first carpometacarpal joint. There is no evidence for  fracture or dislocation.    ABDIEL MORIN MD       ASSESSMENT/PLAN:       1. Closed fracture of multiple ribs of left side with routine healing, subsequent encounter  Rib fractures are healing; he was counseled that they take a long time to heal and pain can be severe for several months  Refilled oxycodone risks and side effects discussed  He is not ready to return to work, but may be able to return later this month   - oxyCODONE IR (ROXICODONE) 5 MG tablet; Take 1-2 tablets (5-10 mg) by mouth nightly as needed for pain maximum 3 tablet(s) per day  Dispense: 30 tablet; Refill: 0    2. Left wrist pain  Follow up with Dr. Dodson will be important to exclude fracture or other problem that may not have been detected by initial x-ray     3. Benign essential hypertension  OK control given the amount of pain he is having and the fact that he is very stressed about his injuries       FUTURE APPOINTMENTS:       - Follow-up visit as symptoms dictate     Lam Rodríguez  MD  [facility]

## 2018-01-05 NOTE — LETTER
To Whom it May Concern:     Chrystal Solis was evaluated in clinic on 1/5/18 in follow up for an injury that occurred on 12/11/17.  He will require until at least 1/23/18 away from work activities to optimally recover from his injury.      Sincerely,      Lam Rodríguez

## 2018-01-05 NOTE — MR AVS SNAPSHOT
"              After Visit Summary   1/5/2018    Chrystal Solis    MRN: [id number]           Patient Information     Date Of Birth          1950        Visit Information        Provider Department      1/5/2018 3:30 PM Lam Rodríguez MD [facility]        Today's Diagnoses     Closed fracture of multiple ribs of left side with routine healing, subsequent encounter    -  1    Left wrist pain        Benign essential hypertension           Follow-ups after your visit        Who to contact     If you have questions or need follow up information about today's clinic visit or your schedule please contact [facility] directly at [phone].  Normal or non-critical lab and imaging results will be communicated to you by Clinklehart, letter or phone within 4 business days after the clinic has received the results. If you do not hear from us within 7 days, please contact the clinic through Clinklehart or phone. If you have a critical or abnormal lab result, we will notify you by phone as soon as possible.  Submit refill requests through CoachUp or call your pharmacy and they will forward the refill request to us. Please allow 3 business days for your refill to be completed.          Additional Information About Your Visit        MyChart Information     CoachUp gives you secure access to your electronic health record. If you see a primary care provider, you can also send messages to your care team and make appointments. If you have questions, please call your primary care clinic.  If you do not have a primary care provider, please call [phone] and they will assist you.        Care EveryWhere ID     This is your Care EveryWhere ID. This could be used by other organizations to access your [facility] medical records  [id number]        Your Vitals Were     Pulse Temperature Height Pulse Oximetry BMI (Body Mass Index)       99 98  F (36.7  C) (Oral) 6' 0.5\" (1.842 m) 97% 33.15 kg/m2        Blood " Pressure from Last 3 Encounters:   01/05/18 138/88   12/18/17 146/84   12/11/17 156/80    Weight from Last 3 Encounters:   01/05/18 247 lb 12.8 oz (112.4 kg)   12/18/17 257 lb 11.2 oz (116.9 kg)   12/11/17 254 lb (115.2 kg)              Today, you had the following     No orders found for display         Where to get your medicines      Some of these will need a paper prescription and others can be bought over the counter.  Ask your nurse if you have questions.     Bring a paper prescription for each of these medications     oxyCODONE IR 5 MG tablet          Primary Care Provider Office Phone # Fax #    Lam Rodríguez [phone]       [facility] [address] [address]        Equal Access to Services     [employer] [employer] : Hadii choco [place] hadasho Sopatricia, waaxda luqadaha, qaybta kaalmada ayaan, cory granda . So [facility] [phone].    ATENCIÓN: Si habla español, tiene a sanchez disposición servicios gratuitos de asistencia lingüística. Carmen al [phone].    We comply with applicable federal civil rights laws and [place] laws. We do not discriminate on the basis of race, color, national origin, age, disability, sex, sexual orientation, or gender identity.            Thank you!     Thank you for choosing [facility]  for your care. Our goal is always to provide you with excellent care. Hearing back from our patients is one way we can continue to improve our services. Please take a few minutes to complete the written survey that you may receive in the mail after your visit with us. Thank you!             Your Updated Medication List - Protect others around you: Learn how to safely use, store and throw away your medicines at www.disposemymeds.org.          This list is accurate as of: 1/5/18  4:18 PM.  Always use your most recent med list.                   Brand Name Dispense Instructions for use Diagnosis    amLODIPine 10 MG tablet     NORVASC    90 tablet    Take 1 tablet (10 mg) by mouth daily    Benign essential hypertension       atorvastatin 10 MG tablet    LIPITOR    90 tablet    Take 1 tablet (10 mg) by mouth daily    Hyperlipidemia LDL goal <130       hydrochlorothiazide 25 MG tablet    HYDRODIURIL    90 tablet    Take 1 tablet (25 mg) by mouth as needed    Benign essential hypertension       irbesartan 150 MG tablet    AVAPRO    180 tablet    Take 2 tablets (300 mg) by mouth daily    Benign essential hypertension       * order for DME     1 Device    Rib belt    Closed fracture of multiple ribs of left side, initial encounter, Wrist injury, right, initial encounter       * order for DME     1 Device    Equipment being ordered: right thumb spica splint    Closed fracture of multiple ribs of left side, initial encounter       oxyCODONE IR 5 MG tablet    ROXICODONE    30 tablet    Take 1-2 tablets (5-10 mg) by mouth nightly as needed for pain maximum 3 tablet(s) per day    Closed fracture of multiple ribs of left side with routine healing, subsequent encounter       * Notice:  This list has 2 medication(s) that are the same as other medications prescribed for you. Read the directions carefully, and ask your doctor or other care provider to review them with you.

## 2018-02-16 DIAGNOSIS — E78.5 HYPERLIPIDEMIA LDL GOAL <130: ICD-10-CM

## 2018-02-16 RX ORDER — ATORVASTATIN CALCIUM 10 MG/1
10 TABLET, FILM COATED ORAL DAILY
Qty: 90 TABLET | Refills: 3 | Status: SHIPPED | OUTPATIENT
Start: 2018-02-16 | End: 2019-04-10

## 2018-02-16 NOTE — TELEPHONE ENCOUNTER
"  atorvastatin (LIPITOR) 10 MG tablet 90 tablet 3 2/9/2017       Last Written Prescription Date:  02/09/2017  Last Fill Quantity: 90,  # refills: 3   Last office visit: 1/5/2018 with prescribing provider:     Future Office Visit:  Unknown    Requested Prescriptions   Pending Prescriptions Disp Refills     atorvastatin (LIPITOR) 10 MG tablet 90 tablet 3     Sig: Take 1 tablet (10 mg) by mouth daily    Statins Protocol Failed    2/16/2018  3:38 PM       Failed - LDL on file in past 12 months    Recent Labs   Lab Test  01/11/17   0959   LDL  74            Passed - No abnormal creatine kinase in past 12 months    No lab results found.         Passed - Recent or future visit with authorizing provider    Patient had office visit in the last year or has a visit in the next 30 days with authorizing provider.  See \"Patient Info\" tab in inbasket, or \"Choose Columns\" in Meds & Orders section of the refill encounter.            Passed - Patient is age 18 or older            "

## 2018-02-16 NOTE — TELEPHONE ENCOUNTER
Routing refill request to provider for review/approval because:  Protocol failed due to NO LDL Panel in over 1 year.  PCP: would you like patient to come in for lab only appt for updated LDL?    Julianna Mi RN

## 2018-03-01 ENCOUNTER — TRANSFERRED RECORDS (OUTPATIENT)
Dept: HEALTH INFORMATION MANAGEMENT | Facility: CLINIC | Age: 68
End: 2018-03-01

## 2018-03-09 DIAGNOSIS — I10 BENIGN ESSENTIAL HYPERTENSION: ICD-10-CM

## 2018-03-09 RX ORDER — IRBESARTAN 150 MG/1
300 TABLET ORAL DAILY
Qty: 180 TABLET | Refills: 1 | Status: SHIPPED | OUTPATIENT
Start: 2018-03-09 | End: 2018-08-29

## 2018-03-09 NOTE — TELEPHONE ENCOUNTER
Irbesartan  Last Written Prescription Date:  12/07/2017  Last Fill Quantity: 180,  # refills: 0   Last office visit: 1/5/2018 with prescribing provider:     Future Office Visit:    Requested Prescriptions   Pending Prescriptions Disp Refills     irbesartan (AVAPRO) 150 MG tablet 180 tablet 0     Sig: Take 2 tablets (300 mg) by mouth daily    There is no refill protocol information for this order

## 2018-03-09 NOTE — TELEPHONE ENCOUNTER
Reason for Call:  medication refill    Do you use a [facility]?  Name of the pharmacy and phone number for the current request:     [facility] - [place], [place] - [address] [place] [address]      Name of the medication requested: irbesartan (AVAPRO) 150 MG tablet    Other request: pharmacy calling stating they will be faxing over medication refill request for pt and that pt is completely out.    Can we leave a detailed message on this number? Not Applicable    Phone number patient can be reached at: Other phone number:  [place] [phone]  Fax# [phone]    Best Time: any    Call taken on 3/9/2018 at 11:42 AM by Selvin Veras

## 2018-03-09 NOTE — TELEPHONE ENCOUNTER
PCP, labs slight overdue, but pt has been in for frequent visits recently.  Pended rx, please advise only if needing a Px/labs sooner than pended rx.  Fiona Hernadez RN       Creatinine   Date Value Ref Range Status   01/11/2017 0.75 0.66 - 1.25 mg/dL Final   ]  Potassium   Date Value Ref Range Status   01/11/2017 3.8 3.4 - 5.3 mmol/L Final

## 2018-03-19 ENCOUNTER — TELEPHONE (OUTPATIENT)
Dept: FAMILY MEDICINE | Facility: CLINIC | Age: 68
End: 2018-03-19

## 2018-03-19 DIAGNOSIS — I10 BENIGN ESSENTIAL HYPERTENSION: ICD-10-CM

## 2018-03-19 RX ORDER — HYDROCHLOROTHIAZIDE 25 MG/1
25 TABLET ORAL PRN
Qty: 30 TABLET | Refills: 0 | Status: SHIPPED | OUTPATIENT
Start: 2018-03-19 | End: 2018-04-18

## 2018-03-19 NOTE — TELEPHONE ENCOUNTER
Reason for Call:  Medication or medication refill:    Do you use a [facility]?  Name of the pharmacy and phone number for the current request:       [facility] - [place], [place] - [address] [place] [address]      Name of the medication requested: HCTZ 25mg  They sent a refill request on the 13th, not sure why we never received it was  Sent electronically, they Pharmacy did give him a weeks worth so far cause he was  Out  Can you please call this in ASAP?    Other request: Needs ASAP    Can we leave a detailed message on this number? YES        Call taken on 3/19/2018 at 2:29 PM by Patricio Cherry

## 2018-03-19 NOTE — TELEPHONE ENCOUNTER
"Last Written Prescription Date:  2/9/19  Last Fill Quantity: 90,  # refills: 3   Last office visit: 1/5/2018    Future Office Visit:                  equested Prescriptions   Pending Prescriptions Disp Refills     hydrochlorothiazide (HYDRODIURIL) 25 MG tablet 90 tablet 3     Sig: Take 1 tablet (25 mg) by mouth as needed    Diuretics (Including Combos) Protocol Failed    3/19/2018  2:34 PM       Failed - Normal serum creatinine on file in past 12 months    Recent Labs   Lab Test  01/11/17   0959   CR  0.75             Failed - Normal serum potassium on file in past 12 months    Recent Labs   Lab Test  01/11/17   0959   POTASSIUM  3.8                   Failed - Normal serum sodium on file in past 12 months    Recent Labs   Lab Test  01/11/17   0959   NA  138             Passed - Blood pressure under 140/90 in past 12 months    BP Readings from Last 3 Encounters:   01/05/18 138/88   12/18/17 146/84   12/11/17 156/80                Passed - Recent (12 mo) or future (30 days) visit within the authorizing provider's specialty    Patient had office visit in the last 12 months or has a visit in the next 30 days with authorizing provider or within the authorizing provider's specialty.  See \"Patient Info\" tab in inbasket, or \"Choose Columns\" in Meds & Orders section of the refill encounter.           Passed - Patient is age 18 or older        HCTZ  Last Written Prescription Date:  02/09/17  Last Fill Quantity: 90,  # refills: 3   Last office visit: 1/5/2018 with prescribing provider:     Future Office Visit:    Esha Dias MA        "

## 2018-03-19 NOTE — TELEPHONE ENCOUNTER
30 day salvador refill given.  Patient is due for an office visit and labs.  Please notify and assist in scheduling.  Maria Del Rosario Hale RN  [facility] [facility]

## 2018-03-30 DIAGNOSIS — I10 BENIGN ESSENTIAL HYPERTENSION: ICD-10-CM

## 2018-03-30 NOTE — TELEPHONE ENCOUNTER
"Last Written Prescription Date:  2/09/17  Last Fill Quantity: 90 tablet,  # refills: 3   Last office visit: 1/5/2018 with prescribing provider:  Anen   Future Office Visit:   Next 5 appointments (look out 90 days)     Apr 12, 2018 11:00 AM CDT   [facility] Visit with Lam Rod[facility]guez MD   [facility]    [address] [facility] [address] [phone]                 Requested Prescriptions   Pending Prescriptions Disp Refills     amLODIPine (NORVASC) 10 MG tablet 90 tablet 3     Sig: Take 1 tablet (10 mg) by mouth daily    Calcium Channel Blockers Protocol  Failed    3/30/2018  6:21 PM       Failed - Normal serum creatinine on file in past 12 months    Recent Labs   Lab Test  01/11/17   0959   CR  0.75            Passed - Blood pressure under 140/90 in past 12 months    BP Readings from Last 3 Encounters:   01/05/18 138/88   12/18/17 146/84   12/11/17 156/80                Passed - Recent (12 mo) or future (30 days) visit within the authorizing provider's specialty    Patient had office visit in the last 12 months or has a visit in the next 30 days with authorizing provider or within the authorizing provider's specialty.  See \"Patient Info\" tab in inbasket, or \"Choose Columns\" in Meds & Orders section of the refill encounter.           Passed - Patient is age 18 or older          "

## 2018-04-02 RX ORDER — AMLODIPINE BESYLATE 10 MG/1
10 TABLET ORAL DAILY
Qty: 90 TABLET | Refills: 3 | Status: SHIPPED | OUTPATIENT
Start: 2018-04-02 | End: 2019-05-08

## 2018-04-12 ENCOUNTER — OFFICE VISIT (OUTPATIENT)
Dept: FAMILY MEDICINE | Facility: CLINIC | Age: 68
End: 2018-04-12
Payer: COMMERCIAL

## 2018-04-12 VITALS
TEMPERATURE: 97.2 F | HEIGHT: 73 IN | WEIGHT: 245 LBS | BODY MASS INDEX: 32.47 KG/M2 | DIASTOLIC BLOOD PRESSURE: 82 MMHG | SYSTOLIC BLOOD PRESSURE: 126 MMHG | OXYGEN SATURATION: 95 % | HEART RATE: 66 BPM

## 2018-04-12 DIAGNOSIS — Z11.59 NEED FOR HEPATITIS C SCREENING TEST: ICD-10-CM

## 2018-04-12 DIAGNOSIS — N52.9 MALE ERECTILE DISORDER: ICD-10-CM

## 2018-04-12 DIAGNOSIS — I87.2 VENOUS (PERIPHERAL) INSUFFICIENCY: ICD-10-CM

## 2018-04-12 DIAGNOSIS — Z12.5 SPECIAL SCREENING, PROSTATE CANCER: ICD-10-CM

## 2018-04-12 DIAGNOSIS — C02.9 SQUAMOUS CELL CARCINOMA OF TONGUE (H): ICD-10-CM

## 2018-04-12 DIAGNOSIS — I10 BENIGN ESSENTIAL HYPERTENSION: ICD-10-CM

## 2018-04-12 DIAGNOSIS — Z23 NEED FOR PROPHYLACTIC VACCINATION AGAINST STREPTOCOCCUS PNEUMONIAE (PNEUMOCOCCUS): ICD-10-CM

## 2018-04-12 DIAGNOSIS — R68.82 LOW LIBIDO: ICD-10-CM

## 2018-04-12 DIAGNOSIS — Z00.00 ROUTINE GENERAL MEDICAL EXAMINATION AT A HEALTH CARE FACILITY: Primary | ICD-10-CM

## 2018-04-12 DIAGNOSIS — E78.5 HYPERLIPIDEMIA LDL GOAL <130: ICD-10-CM

## 2018-04-12 PROBLEM — D17.30 LIPOMA OF SKIN AND SUBCUTANEOUS TISSUE: Status: RESOLVED | Noted: 2017-01-24 | Resolved: 2018-04-12

## 2018-04-12 LAB
ERYTHROCYTE [DISTWIDTH] IN BLOOD BY AUTOMATED COUNT: 13.7 % (ref 10–15)
HCT VFR BLD AUTO: 52 % (ref 40–53)
HGB BLD-MCNC: 18.2 G/DL (ref 13.3–17.7)
MCH RBC QN AUTO: 30.2 PG (ref 26.5–33)
MCHC RBC AUTO-ENTMCNC: 35 G/DL (ref 31.5–36.5)
MCV RBC AUTO: 86 FL (ref 78–100)
PLATELET # BLD AUTO: 220 10E9/L (ref 150–450)
RBC # BLD AUTO: 6.03 10E12/L (ref 4.4–5.9)
WBC # BLD AUTO: 5.9 10E9/L (ref 4–11)

## 2018-04-12 PROCEDURE — 80061 LIPID PANEL: CPT | Performed by: INTERNAL MEDICINE

## 2018-04-12 PROCEDURE — G0009 ADMIN PNEUMOCOCCAL VACCINE: HCPCS | Performed by: INTERNAL MEDICINE

## 2018-04-12 PROCEDURE — 84443 ASSAY THYROID STIM HORMONE: CPT | Performed by: INTERNAL MEDICINE

## 2018-04-12 PROCEDURE — 99397 PER PM REEVAL EST PAT 65+ YR: CPT | Mod: 25 | Performed by: INTERNAL MEDICINE

## 2018-04-12 PROCEDURE — 80053 COMPREHEN METABOLIC PANEL: CPT | Performed by: INTERNAL MEDICINE

## 2018-04-12 PROCEDURE — 90732 PPSV23 VACC 2 YRS+ SUBQ/IM: CPT | Performed by: INTERNAL MEDICINE

## 2018-04-12 PROCEDURE — 85027 COMPLETE CBC AUTOMATED: CPT | Performed by: INTERNAL MEDICINE

## 2018-04-12 PROCEDURE — 99213 OFFICE O/P EST LOW 20 MIN: CPT | Mod: 25 | Performed by: INTERNAL MEDICINE

## 2018-04-12 PROCEDURE — 86803 HEPATITIS C AB TEST: CPT | Performed by: INTERNAL MEDICINE

## 2018-04-12 PROCEDURE — 36415 COLL VENOUS BLD VENIPUNCTURE: CPT | Performed by: INTERNAL MEDICINE

## 2018-04-12 PROCEDURE — G0103 PSA SCREENING: HCPCS | Performed by: INTERNAL MEDICINE

## 2018-04-12 RX ORDER — SILDENAFIL 25 MG/1
25 TABLET, FILM COATED ORAL DAILY PRN
Qty: 12 TABLET | Refills: 11 | Status: SHIPPED | OUTPATIENT
Start: 2018-04-12 | End: 2020-06-18

## 2018-04-12 NOTE — PROGRESS NOTES
SUBJECTIVE:   Chrystal Solis is a 67 year old male who presents for Preventive Visit.    Are you in the first 12 months of your Medicare Part B coverage?  No    Healthy Habits:    Do you get at least three servings of calcium containing foods daily (dairy, green leafy vegetables, etc.)? yes    Amount of exercise or daily activities, outside of work: 4 day(s) per week    Problems taking medications regularly No    Medication side effects: No    Have you had an eye exam in the past two years? yes    Do you see a dentist twice per year? No - once yearly    Do you have sleep apnea, excessive snoring or daytime drowsiness?no      Ability to successfully perform activities of daily living: Yes, no assistance needed    Home safety:  none identified     Hearing impairment: Yes, hearing is managed by specialist    Fall risk:  Fallen 2 or more times in the past year?: No  Any fall with injury in the past year?: Yes      COGNITIVE SCREEN  1) Repeat 3 items (Banana, Sunrise, Chair)    2) Clock draw: NORMAL  3) 3 item recall: Recalls 3 objects  Results: 3 items recalled: COGNITIVE IMPAIRMENT LESS LIKELY    Mini-CogTM Copyright S Sarah. Licensed by the author for use in [facility]; reprinted with permission (josefa@[facility]). All rights reserved.        Reviewed and updated as needed this visit by clinical staff  Tobacco  Allergies  Meds  Med Hx  Surg Hx  Fam Hx  Soc Hx        Reviewed and updated as needed this visit by Provider  Tobacco  Med Hx  Surg Hx  Fam Hx  Soc Hx       Social History   Substance Use Topics     Smoking status: Former Smoker     Packs/day: 0.25     Years: 2.00     Types: Cigarettes     Quit date: 1/1/1978     Smokeless tobacco: Never Used      Comment: smoked socially in college     Alcohol use 0.0 oz/week     0 Standard drinks or equivalent per week      Comment: 3-4 drinks per week       If you drink alcohol do you typically have >3 drinks per day or >7 drinks per week? No                         Today's PHQ-2 Score:   PHQ-2 ( 1999 Pfizer) 4/12/2018 1/5/2018   Q1: Little interest or pleasure in doing things 0 0   Q2: Feeling down, depressed or hopeless 0 0   PHQ-2 Score 0 0       Do you feel safe in your environment - Yes    Do you have a Health Care Directive?: No: Advance care planning reviewed with patient; information given to patient to review.    Current providers sharing in care for this patient include:   Patient Care Team:  Lam Rodríguez MD as PCP - General (Internal Medicine)    The following health maintenance items are reviewed in Epic and correct as of today:  Health Maintenance   Topic Date Due     HEPATITIS C SCREENING  12/11/1969     FALL RISK ASSESSMENT  02/09/2018     PNEUMOCOCCAL (2 of 2 - PPSV23) 02/09/2018     INFLUENZA VACCINE (SYSTEM ASSIGNED)  09/01/2018     LIPID SCREEN Q5 YR MALE (SYSTEM ASSIGNED)  01/11/2022     TETANUS IMMUNIZATION (SYSTEM ASSIGNED)  08/01/2022     ADVANCE DIRECTIVE PLANNING Q5 YRS  12/18/2022     COLON CANCER SCREEN (SYSTEM ASSIGNED)  03/08/2027     AORTIC ANEURYSM SCREENING (SYSTEM ASSIGNED)  Completed     Patient Active Problem List   Diagnosis     Squamous cell carcinoma of tongue (H)     Benign essential hypertension     Hyperlipidemia LDL goal <130     Peripheral polyneuropathy     Advanced directives, counseling/discussion     Past Surgical History:   Procedure Laterality Date     BIOPSY       COLONOSCOPY  3/2009     COLONOSCOPY N/A 3/8/2017    Procedure: COMBINED COLONOSCOPY, SINGLE OR MULTIPLE BIOPSY/POLYPECTOMY BY BIOPSY;  Surgeon: Yung Maguire MD;  Location:  GI     ENT SURGERY       EXCISE LESION TRUNK N/A 2/3/2017    Procedure: EXCISE LESION TRUNK;  Surgeon: Jaswinder Lopez MD;  Location: [facility]     EXCISE LESION UPPER EXTREMITY Left 2/3/2017    Procedure: EXCISE LESION UPPER EXTREMITY;  Surgeon: Jaswinder Lopez MD;  Location: [facility]     HEAD & NECK SURGERY       HERNIA REPAIR Left 2002     ORTHOPEDIC SURGERY   "2002    torn ligament right shoulder     SOFT TISSUE SURGERY  12/6/10    excisional biopsy of tongue lesion     tonsillectomy  Age 6       Social History   Substance Use Topics     Smoking status: Former Smoker     Packs/day: 0.25     Years: 2.00     Types: Cigarettes     Quit date: 1/1/1978     Smokeless tobacco: Never Used      Comment: smoked socially in college     Alcohol use 0.0 oz/week     0 Standard drinks or equivalent per week      Comment: 3-4 drinks per week     Family History   Problem Relation Age of Onset     CEREBROVASCULAR DISEASE Mother      Hypertension Mother      Respiratory Father      heavy smoker     CANCER Maternal Grandfather      lip cancer--heavy smoker     Unknown/Adopted Son      Unknown/Adopted Daughter          Current Outpatient Prescriptions   Medication Sig Dispense Refill     amLODIPine (NORVASC) 10 MG tablet Take 1 tablet (10 mg) by mouth daily 90 tablet 3     hydrochlorothiazide (HYDRODIURIL) 25 MG tablet Take 1 tablet (25 mg) by mouth as needed 30 tablet 0     irbesartan (AVAPRO) 150 MG tablet Take 2 tablets (300 mg) by mouth daily 180 tablet 1     atorvastatin (LIPITOR) 10 MG tablet Take 1 tablet (10 mg) by mouth daily 90 tablet 3     order for DME Rib belt 1 Device 0     order for DME Equipment being ordered: right thumb spica splint 1 Device 0     No Known Allergies        ROS:  Several years of progressive low libido and erection problems  Wife wants him to have TSH due to radiation exposure on neck form head and neck cancer treatment  Rib pain is better  Saw Iv[facility] for wrist who recommended splint and possible surgery   Constitutional, HEENT, cardiovascular, pulmonary, gi and gu systems are negative, except as otherwise noted.    OBJECTIVE:   /82 (BP Location: Left arm, Cuff Size: Adult Large)  Pulse 66  Temp 97.2  F (36.2  C) (Tympanic)  Ht 6' 0.5\" (1.842 m)  Wt 245 lb (111.1 kg)  SpO2 95%  BMI 32.77 kg/m2 Estimated body mass index is 32.77 kg/(m^2) as " "calculated from the following:    Height as of this encounter: 6' 0.5\" (1.842 m).    Weight as of this encounter: 245 lb (111.1 kg).  EXAM:   GENERAL: healthy, alert and no distress  EYES: Eyes grossly normal to inspection, PERRL and conjunctivae and sclerae normal  HENT: ear canals and TM's normal, nose and mouth without ulcers or lesions  NECK: no adenopathy, no asymmetry, masses, or scars and thyroid normal to palpation  RESP: lungs clear to auscultation - no rales, rhonchi or wheezes  CV: regular rate and rhythm, normal S1 S2, no S3 or S4, no murmur, click or rub, no peripheral edema and peripheral pulses strong  ABDOMEN: soft, nontender, no hepatosplenomegaly, no masses and bowel sounds normal  RECTAL: normal sphincter tone, no rectal masses, prostate normal size, smooth, nontender without nodules or masses  MS: no gross musculoskeletal defects noted, no edema  SKIN: no suspicious lesions or rashes  NEURO: Normal strength and tone, mentation intact and speech normal  PSYCH: mentation appears normal, affect normal/bright    ASSESSMENT / PLAN:   1. Routine general medical examination at a health care facility      2. Squamous cell carcinoma of tongue (H)  Continue follow up at [place] annually; check thyroid test   - TSH    3. Hyperlipidemia LDL goal <130    - Comprehensive metabolic panel  - CBC with platelets  - Lipid panel reflex to direct LDL Non-fasting    4. Benign essential hypertension  Well controlled     5. Need for prophylactic vaccination against Streptococcus pneumoniae (pneumococcus)    - Pneumococcal vaccine 23 valent PPSV23  (Pneumovax) [32850]  - ADMIN: Vaccine, Initial (55634)    6. Need for hepatitis C screening test    - Hepatitis C Screen Reflex to HCV RNA Quant and Genotype    7. Special screening, prostate cancer    - Prostate spec antigen screen    8. Low libido  Return for 8 AM testosterone level  - **Testosterone Free and Total FUTURE anytime; Future    9. Male erectile disorder  Can try " "viagra; side effects and risks discussed   - sildenafil (VIAGRA) 25 MG tablet; Take 1 tablet (25 mg) by mouth daily as needed 30 min to 4 hrs before sex. Do not use with nitroglycerin, terazosin or doxazosin.  Dispense: 12 tablet; Refill: 11    10. Venous (peripheral) insufficiency    - Elastic Bandages & Supports (JOBST ACTIVE 20-30MMHG MEDIUM) MISC; 1 packet daily as needed  Dispense: 1 each; Refill: 0    End of Life Planning:  Patient currently has an advanced directive: No.  I have verified the patient's ablity to prepare an advanced directive/make health care decisions.  Literature was provided to assist patient in preparing an advanced directive.    COUNSELING:  Reviewed preventive health counseling, as reflected in patient instructions  Special attention given to:       Regular exercise       Healthy diet/nutrition       Immunizations    Vaccinated for: Pneumococcal 23    Estimated body mass index is 32.77 kg/(m^2) as calculated from the following:    Height as of this encounter: 6' 0.5\" (1.842 m).    Weight as of this encounter: 245 lb (111.1 kg).  Weight management plan: Discussed healthy diet and exercise guidelines and patient will follow up in 12 months in clinic to re-evaluate.     reports that he quit smoking about 40 years ago. His smoking use included Cigarettes. He has a 0.50 pack-year smoking history. He has never used smokeless tobacco.      Appropriate preventive services were discussed with this patient, including applicable screening as appropriate for cardiovascular disease, diabetes, osteopenia/osteoporosis, and glaucoma.  As appropriate for age/gender, discussed screening for colorectal cancer, prostate cancer, breast cancer, and cervical cancer. Checklist reviewing preventive services available has been given to the patient.    Reviewed patients plan of care and provided an AVS. The Basic Care Plan (routine screening as documented in Health Maintenance) for Chrystal meets the Care Plan " requirement. This Care Plan has been established and reviewed with the Patient.    Counseling Resources:  ATP IV Guidelines  Pooled Cohorts Equation Calculator  Breast Cancer Risk Calculator  FRAX Risk Assessment  ICSI Preventive Guidelines  Dietary Guidelines for Americans, 2010  USDA's MyPlate  ASA Prophylaxis  Lung CA Screening    Lam Rodríguez MD  [facility]

## 2018-04-12 NOTE — MR AVS SNAPSHOT
After Visit Summary   4/12/2018    Chrystal Solis    MRN: [id number]           Patient Information     Date Of Birth          1950        Visit Information        Provider Department      4/12/2018 11:00 AM Lam Rodríguez MD [facility]        Today's Diagnoses     Routine general medical examination at a health care facility    -  1    Squamous cell carcinoma of tongue (H)        Hyperlipidemia LDL goal <130        Benign essential hypertension        Need for prophylactic vaccination against Streptococcus pneumoniae (pneumococcus)        Need for hepatitis C screening test        Special screening, prostate cancer        Low libido        Male erectile disorder        Venous (peripheral) insufficiency          Care Instructions      Preventive Health Recommendations:       Male Ages 65 and over    Yearly exam:             See your health care provider every year in order to  o   Review health changes.   o   Discuss preventive care.    o   Review your medicines if your doctor has prescribed any.    Talk with your health care provider about whether you should have a test to screen for prostate cancer (PSA).    Every 3 years, have a diabetes test (fasting glucose). If you are at risk for diabetes, you should have this test more often.    Every 5 years, have a cholesterol test. Have this test more often if you are at risk for high cholesterol or heart disease.     Every 10 years, have a colonoscopy. Or, have a yearly FIT test (stool test). These exams will check for colon cancer.    Talk to with your health care provider about screening for Abdominal Aortic Aneurysm if you have a family history of AAA or have a history of smoking.  Shots:     Get a flu shot each year.     Get a tetanus shot every 10 years.     Talk to your doctor about your pneumonia vaccines. There are now two you should receive - Pneumovax (PPSV 23) and Prevnar (PCV 13).    Talk to your doctor about a shingles  vaccine.     Talk to your doctor about the hepatitis B vaccine.  Nutrition:     Eat at least 5 servings of fruits and vegetables each day.     Eat whole-grain bread, whole-wheat pasta and brown rice instead of white grains and rice.     Talk to your doctor about Calcium and Vitamin D.   Lifestyle    Exercise for at least 150 minutes a week (30 minutes a day, 5 days a week). This will help you control your weight and prevent disease.     Limit alcohol to one drink per day.     No smoking.     Wear sunscreen to prevent skin cancer.     See your dentist every six months for an exam and cleaning.     See your eye doctor every 1 to 2 years to screen for conditions such as glaucoma, macular degeneration and cataracts.          Follow-ups after your visit        Future tests that were ordered for you today     Open Future Orders        Priority Expected Expires Ordered    **Testosterone Free and Total FUTURE anytime Routine 4/12/2018 4/12/2019 4/12/2018            Who to contact     If you have questions or need follow up information about today's clinic visit or your schedule please contact [facility] directly at [phone].  Normal or non-critical lab and imaging results will be communicated to you by [employer], letter or phone within 4 business days after the clinic has received the results. If you do not hear from us within 7 days, please contact the clinic through [employer] or phone. If you have a critical or abnormal lab result, we will notify you by phone as soon as possible.  Submit refill requests through [employer] or call your pharmacy and they will forward the refill request to us. Please allow 3 business days for your refill to be completed.          Additional Information About Your Visit        [employer] Information     [employer] gives you secure access to your electronic health record. If you see a primary care provider, you can also send messages to your care team and make appointments. If you have  "questions, please call your primary care clinic.  If you do not have a primary care provider, please call [phone] and they will assist you.        Care EveryWhere ID     This is your Care EveryWhere ID. This could be used by other organizations to access your [facility] medical records  [id number]        Your Vitals Were     Pulse Temperature Height Pulse Oximetry BMI (Body Mass Index)       66 97.2  F (36.2  C) (Tympanic) 6' 0.5\" (1.842 m) 95% 32.77 kg/m2        Blood Pressure from Last 3 Encounters:   04/12/18 126/82   01/05/18 138/88   12/18/17 146/84    Weight from Last 3 Encounters:   04/12/18 245 lb (111.1 kg)   01/05/18 247 lb 12.8 oz (112.4 kg)   12/18/17 257 lb 11.2 oz (116.9 kg)              We Performed the Following     ADMIN: Vaccine, Initial (63281)     CBC with platelets     Comprehensive metabolic panel     Hepatitis C Screen Reflex to HCV RNA Quant and Genotype     Lipid panel reflex to direct LDL Non-fasting     Pneumococcal vaccine 23 valent PPSV23  (Pneumovax) [96982]     Prostate spec antigen screen     TSH          Today's Medication Changes          These changes are accurate as of 4/12/18 12:30 PM.  If you have any questions, ask your nurse or doctor.               Start taking these medicines.        Dose/Directions    JOBST ACTIVE 20-30MMHG MEDIUM Misc   Used for:  Venous (peripheral) insufficiency   Started by:  Lam Rodríguez MD        Dose:  1 packet   1 packet daily as needed   Quantity:  1 each   Refills:  0       sildenafil 25 MG tablet   Commonly known as:  VIAGRA   Used for:  Male erectile disorder   Started by:  Lam Rodríguez MD        Dose:  25 mg   Take 1 tablet (25 mg) by mouth daily as needed 30 min to 4 hrs before sex. Do not use with nitroglycerin, terazosin or doxazosin.   Quantity:  12 tablet   Refills:  11            Where to get your medicines      Some of these will need a paper prescription and others can be bought over the counter.  Ask your nurse if you have " questions.     Bring a paper prescription for each of these medications     JOBST ACTIVE 20-30MMHG MEDIUM Misc    sildenafil 25 MG tablet                Primary Care Provider Office Phone # Fax #    Lam Rodríguez [phone]       [facility] [address] [facility] [address]  [facility] [address]        Equal Access to Services     [employer]SON [place]ORE : Hadii aad ku hadasho Soomaali, waaxda luqadaha, qaybta kaalmada adeegyada, waxay idiin hayaan adeeg kharash la'aan . So [facility] [phone].    ATENCIÓN: Si habla español, tiene a sanchez disposición servicios gratuitos de asistencia lingüística. Llame al [phone].    We comply with applicable federal civil rights laws and [place] laws. We do not discriminate on the basis of race, color, national origin, age, disability, sex, sexual orientation, or gender identity.            Thank you!     Thank you for choosing [facility]  for your care. Our goal is always to provide you with excellent care. Hearing back from our patients is one way we can continue to improve our services. Please take a few minutes to complete the written survey that you may receive in the mail after your visit with us. Thank you!             Your Updated Medication List - Protect others around you: Learn how to safely use, store and throw away your medicines at www.disposemymeds.org.          This list is accurate as of 4/12/18 12:30 PM.  Always use your most recent med list.                   Brand Name Dispense Instructions for use Diagnosis    amLODIPine 10 MG tablet    NORVASC    90 tablet    Take 1 tablet (10 mg) by mouth daily    Benign essential hypertension       atorvastatin 10 MG tablet    LIPITOR    90 tablet    Take 1 tablet (10 mg) by mouth daily    Hyperlipidemia LDL goal <130       hydrochlorothiazide 25 MG tablet    HYDRODIURIL    30 tablet    Take 1 tablet (25 mg) by mouth as needed    Benign essential hypertension       irbesartan 150 MG tablet    AVAPRO     180 tablet    Take 2 tablets (300 mg) by mouth daily    Benign essential hypertension       JOBST ACTIVE 20-30MMHG MEDIUM Misc     1 each    1 packet daily as needed    Venous (peripheral) insufficiency       * order for DME     1 Device    Rib belt    Closed fracture of multiple ribs of left side, initial encounter, Wrist injury, right, initial encounter       * order for DME     1 Device    Equipment being ordered: right thumb spica splint    Closed fracture of multiple ribs of left side, initial encounter       sildenafil 25 MG tablet    VIAGRA    12 tablet    Take 1 tablet (25 mg) by mouth daily as needed 30 min to 4 hrs before sex. Do not use with nitroglycerin, terazosin or doxazosin.    Male erectile disorder       * Notice:  This list has 2 medication(s) that are the same as other medications prescribed for you. Read the directions carefully, and ask your doctor or other care provider to review them with you.

## 2018-04-12 NOTE — NURSING NOTE
"Chief Complaint   Patient presents with     Wellness Visit       Initial /82 (BP Location: Left arm, Cuff Size: Adult Large)  Pulse 66  Temp 97.2  F (36.2  C) (Tympanic)  Ht 6' 0.5\" (1.842 m)  Wt 245 lb (111.1 kg)  SpO2 95%  BMI 32.77 kg/m2 Estimated body mass index is 32.77 kg/(m^2) as calculated from the following:    Height as of this encounter: 6' 0.5\" (1.842 m).    Weight as of this encounter: 245 lb (111.1 kg).  Medication Reconciliation: complete   Zulma Villegas MA      "

## 2018-04-12 NOTE — NURSING NOTE
Screening Questionnaire for Adult Immunization    Are you sick today?   No   Do you have allergies to medications, food, a vaccine component or latex?   No   Have you ever had a serious reaction after receiving a vaccination?   No   Do you have a long-term health problem with heart disease, lung disease, asthma, kidney disease, metabolic disease (e.g. diabetes), anemia, or other blood disorder?   No   Do you have cancer, leukemia, HIV/AIDS, or any other immune system problem?   No   In the past 3 months, have you taken medications that affect  your immune system, such as prednisone, other steroids, or anticancer drugs; drugs for the treatment of rheumatoid arthritis, Crohn s disease, or psoriasis; or have you had radiation treatments?   No   Have you had a seizure, or a brain or other nervous system problem?   No   During the past year, have you received a transfusion of blood or blood     products, or been given immune (gamma) globulin or antiviral drug?   No   For women: Are you pregnant or is there a chance you could become        pregnant during the next month?   No   Have you received any vaccinations in the past 4 weeks?   No     Immunization questionnaire answers were all negative.        Per orders of Dr. Rodríguez, injection of PPV23 given by Zulma Villegas. Patient instructed to remain in clinic for 15 minutes afterwards, and to report any adverse reaction to me immediately.       Screening performed by Zulma Villegas on 4/12/2018 at 12:10 PM.

## 2018-04-12 NOTE — PROGRESS NOTES
"  SUBJECTIVE:   Chrystal Solis is a 67 year old male who presents to clinic today for the following health issues:    ***    {additional problems for provider to add:319328}    Problem list and histories reviewed & adjusted, as indicated.  Additional history: {NONE - AS DOCUMENTED:248598::\"as documented\"}    {HIST REVIEW/ LINKS 2:109790}    Reviewed and updated as needed this visit by clinical staff       Reviewed and updated as needed this visit by Provider         {PROVIDER CHARTING PREFERENCE:138941}    "

## 2018-04-13 LAB
ALBUMIN SERPL-MCNC: 4.6 G/DL (ref 3.4–5)
ALP SERPL-CCNC: 74 U/L (ref 40–150)
ALT SERPL W P-5'-P-CCNC: 56 U/L (ref 0–70)
ANION GAP SERPL CALCULATED.3IONS-SCNC: 10 MMOL/L (ref 3–14)
AST SERPL W P-5'-P-CCNC: 30 U/L (ref 0–45)
BILIRUB SERPL-MCNC: 1.2 MG/DL (ref 0.2–1.3)
BUN SERPL-MCNC: 12 MG/DL (ref 7–30)
CALCIUM SERPL-MCNC: 9.2 MG/DL (ref 8.5–10.1)
CHLORIDE SERPL-SCNC: 102 MMOL/L (ref 94–109)
CHOLEST SERPL-MCNC: 168 MG/DL
CO2 SERPL-SCNC: 24 MMOL/L (ref 20–32)
CREAT SERPL-MCNC: 0.72 MG/DL (ref 0.66–1.25)
GFR SERPL CREATININE-BSD FRML MDRD: >90 ML/MIN/1.7M2
GLUCOSE SERPL-MCNC: 101 MG/DL (ref 70–99)
HCV AB SERPL QL IA: NONREACTIVE
HDLC SERPL-MCNC: 30 MG/DL
LDLC SERPL CALC-MCNC: 97 MG/DL
NONHDLC SERPL-MCNC: 138 MG/DL
POTASSIUM SERPL-SCNC: 3.6 MMOL/L (ref 3.4–5.3)
PROT SERPL-MCNC: 7.3 G/DL (ref 6.8–8.8)
PSA SERPL-ACNC: 2.3 UG/L (ref 0–4)
SODIUM SERPL-SCNC: 136 MMOL/L (ref 133–144)
TRIGL SERPL-MCNC: 203 MG/DL
TSH SERPL DL<=0.005 MIU/L-ACNC: 3.25 MU/L (ref 0.4–4)

## 2018-04-14 NOTE — PROGRESS NOTES
"The following letter pertains to your most recent diagnostic tests:    -Your hepatitis C screening test is negative.  You do not have hepatitis C.     -Your prostate specific antigen (PSA) test result returned normal.     -Liver and gallbladder tests are normal for you. (ALT,AST, Alk phos, bilirubin), kidney function is normal for you (Creatinine, GFR), Sodium is normal, Potassium is normal for you, Calcium is normal for you, Glucose (blood sugar) is normal for you.      -TSH (thyroid stimulating hormone) level is normal which indicates normal circulating thyroid hormone levels.      -Your total cholesterol is 168 which is at your goal of total cholesterol less than 200.    -Your triglycerides are 2093 which are above your goal of triglycerides less than 150.    -Your HDL or \"good cholesterol\" is 30 which is below your goal of HDL cholesterol greater than 40.    -Your LDL cholesterol or \"bad cholesterol\" is 97 which is at your goal of LDL cholesterol less than <130.  Your LDL goal is based on your risk factors for artery disease.     -Your complete blood counts are normal with he exception of a mildly elevated hemoglobin level which can occur when you are mildly dehydrated from fasting.          Bottom line:  Your hemoglobin is a little high.  This should be rechecked.  Elevated hemoglobin is a contraindication for testosterone therapy.  As such when you return for the 8 AM testosterone level, your hemoglobin should be rechecked.      Reducing carbohydrates and fats in your diet, losing weight and consuming less alcohol can improve your triglyceride levels. Increasing exercise can improve HDL (\"good cholesterol\") levels.  A good target to shoot for is 30 minutes of aerobic activity at least 4 days per week.       Follow up:  Lab appointment when convenient for testosterone and hemoglobin levels.  Try to come in hydrated.  Schedule the appointment for around 8 AM .        Sincerely,    Dr. Rodríguez"

## 2018-04-18 DIAGNOSIS — I10 BENIGN ESSENTIAL HYPERTENSION: ICD-10-CM

## 2018-04-19 RX ORDER — HYDROCHLOROTHIAZIDE 25 MG/1
25 TABLET ORAL PRN
Qty: 90 TABLET | Refills: 3 | Status: SHIPPED | OUTPATIENT
Start: 2018-04-19 | End: 2019-06-19

## 2018-04-19 NOTE — TELEPHONE ENCOUNTER
"Hydrochlorothiazide 25 mg    Last Written Prescription Date:  03/19/18  Last Fill Quantity: 30 tablets,  # refills: 0   Last office visit: 4/12/2018 with prescribing provider:  Anne   Future Office Visit:  Unknown    Requested Prescriptions   Pending Prescriptions Disp Refills     hydrochlorothiazide (HYDRODIURIL) 25 MG tablet 90 tablet 3     Sig: Take 1 tablet (25 mg) by mouth as needed    Diuretics (Including Combos) Protocol Passed    4/18/2018  3:53 PM       Passed - Blood pressure under 140/90 in past 12 months    BP Readings from Last 3 Encounters:   04/12/18 126/82   01/05/18 138/88   12/18/17 146/84                Passed - Recent (12 mo) or future (30 days) visit within the authorizing provider's specialty    Patient had office visit in the last 12 months or has a visit in the next 30 days with authorizing provider or within the authorizing provider's specialty.  See \"Patient Info\" tab in inbasket, or \"Choose Columns\" in Meds & Orders section of the refill encounter.           Passed - Patient is age 18 or older       Passed - Normal serum creatinine on file in past 12 months    Recent Labs   Lab Test  04/12/18   1214   CR  0.72             Passed - Normal serum potassium on file in past 12 months    Recent Labs   Lab Test  04/12/18   1214   POTASSIUM  3.6                   Passed - Normal serum sodium on file in past 12 months    Recent Labs   Lab Test  04/12/18   1214   NA  136                "

## 2018-06-07 ENCOUNTER — TRANSFERRED RECORDS (OUTPATIENT)
Dept: HEALTH INFORMATION MANAGEMENT | Facility: CLINIC | Age: 68
End: 2018-06-07

## 2018-08-29 DIAGNOSIS — I10 BENIGN ESSENTIAL HYPERTENSION: ICD-10-CM

## 2018-08-29 NOTE — TELEPHONE ENCOUNTER
"  irbesartan (AVAPRO) 150 MG tablet 180 tablet 1 3/9/2018       Last Written Prescription Date:  03/09/2018  Last Fill Quantity: 180,  # refills: 1   Last office visit: 4/12/2018 with prescribing provider:     Future Office Visit:  Unknown    Requested Prescriptions   Pending Prescriptions Disp Refills     irbesartan (AVAPRO) 150 MG tablet 180 tablet 1     Sig: Take 2 tablets (300 mg) by mouth daily    Angiotensin-II Receptors Passed    8/29/2018 11:27 AM       Passed - Blood pressure under 140/90 in past 12 months    BP Readings from Last 3 Encounters:   04/12/18 126/82   01/05/18 138/88   12/18/17 146/84                Passed - Recent (12 mo) or future (30 days) visit within the authorizing provider's specialty    Patient had office visit in the last 12 months or has a visit in the next 30 days with authorizing provider or within the authorizing provider's specialty.  See \"Patient Info\" tab in inbasket, or \"Choose Columns\" in Meds & Orders section of the refill encounter.           Passed - Patient is age 18 or older       Passed - Normal serum creatinine on file in past 12 months    Recent Labs   Lab Test  04/12/18   1214   CR  0.72            Passed - Normal serum potassium on file in past 12 months    Recent Labs   Lab Test  04/12/18   1214   POTASSIUM  3.6                      "

## 2018-08-30 RX ORDER — IRBESARTAN 150 MG/1
300 TABLET ORAL DAILY
Qty: 180 TABLET | Refills: 1 | Status: SHIPPED | OUTPATIENT
Start: 2018-08-30 | End: 2019-04-10

## 2018-09-06 ENCOUNTER — TRANSFERRED RECORDS (OUTPATIENT)
Dept: HEALTH INFORMATION MANAGEMENT | Facility: CLINIC | Age: 68
End: 2018-09-06

## 2018-11-20 ENCOUNTER — TRANSFERRED RECORDS (OUTPATIENT)
Dept: HEALTH INFORMATION MANAGEMENT | Facility: CLINIC | Age: 68
End: 2018-11-20

## 2018-12-13 ENCOUNTER — TRANSFERRED RECORDS (OUTPATIENT)
Dept: HEALTH INFORMATION MANAGEMENT | Facility: CLINIC | Age: 68
End: 2018-12-13

## 2019-03-14 ENCOUNTER — TRANSFERRED RECORDS (OUTPATIENT)
Dept: HEALTH INFORMATION MANAGEMENT | Facility: CLINIC | Age: 69
End: 2019-03-14

## 2019-03-26 ENCOUNTER — TELEPHONE (OUTPATIENT)
Dept: FAMILY MEDICINE | Facility: CLINIC | Age: 69
End: 2019-03-26

## 2019-03-26 DIAGNOSIS — E78.5 HYPERLIPIDEMIA LDL GOAL <130: Primary | ICD-10-CM

## 2019-03-26 DIAGNOSIS — Z12.5 SCREENING FOR PROSTATE CANCER: ICD-10-CM

## 2019-03-26 NOTE — TELEPHONE ENCOUNTER
Reason for Call: Request for an order or referral:    Order or referral being requested: fasting lab work.      Date needed: before my next appointment    Has the patient been seen by the PCP for this problem? YES    Additional comments:     Phone number Patient can be reached at:  Home number on file [phone] (home)    Best Time:  any    Can we leave a detailed message on this number?  YES    Call taken on 3/26/2019 at 10:17 AM by Mimi Mcgee

## 2019-03-26 NOTE — TELEPHONE ENCOUNTER
To PCP:     Pt is requesting fasting and other labs for upcoming appt     Pended Labs from last year (except Hep C Screen and Testosterone- please add, if necessary). Dx Codes are needed for TSH    Thank you!  Erlinda JACOBSON RN

## 2019-04-09 ENCOUNTER — TELEPHONE (OUTPATIENT)
Dept: FAMILY MEDICINE | Facility: CLINIC | Age: 69
End: 2019-04-09

## 2019-04-09 DIAGNOSIS — I10 BENIGN ESSENTIAL HYPERTENSION: ICD-10-CM

## 2019-04-09 DIAGNOSIS — E78.5 HYPERLIPIDEMIA LDL GOAL <130: ICD-10-CM

## 2019-04-10 RX ORDER — IRBESARTAN 300 MG/1
300 TABLET ORAL DAILY
Qty: 90 TABLET | Refills: 3 | Status: SHIPPED | OUTPATIENT
Start: 2019-04-10 | End: 2020-05-28

## 2019-04-10 RX ORDER — ATORVASTATIN CALCIUM 10 MG/1
10 TABLET, FILM COATED ORAL DAILY
Qty: 90 TABLET | Refills: 0 | Status: SHIPPED | OUTPATIENT
Start: 2019-04-10 | End: 2019-06-28

## 2019-04-10 RX ORDER — IRBESARTAN 150 MG/1
300 TABLET ORAL DAILY
Qty: 180 TABLET | Refills: 0 | Status: SHIPPED | OUTPATIENT
Start: 2019-04-10 | End: 2019-04-10

## 2019-04-10 NOTE — TELEPHONE ENCOUNTER
[facility] on shortage of 150 mg of Irbesartan, Is it okay for patient to take 1 tablet of the 300 mg daily?    Maurilio Aj, CMA on 4/10/2019 at 3:24 PM

## 2019-04-10 NOTE — TELEPHONE ENCOUNTER
"Pending Prescriptions:                       Disp   Refills    atorvastatin (LIPITOR) 10 MG tablet       90 tab*3            Sig: Take 1 tablet (10 mg) by mouth daily    irbesartan (AVAPRO) 150 MG tablet         180 ta*1            Sig: Take 2 tablets (300 mg) by mouth daily    Atorvastatin  Last Written Prescription Date:  02/16/2018  Last Fill Quantity: 90,  # refills: 3   Last office visit: 4/12/2018 with prescribing provider:  Dr.Liston Terrell Office Visit:   Next 5 appointments (look out 90 days)    Apr 26, 2019  8:00 AM CDT  PH[place] Lam Rod[place]uez [place]  [facility] [place] [address] [address]  [phone]         Irbesartan  Last Written Prescription Date:  08/30/2018  Last Fill Quantity: 180,  # refills: 1   Last office visit: 4/12/2018 with prescribing provider:  Dr.Liston Terrell Office Visit:   Next 5 appointments (look out 90 days)    Apr 26, 2019  8:00 AM CDT  PHYSICAL with Lam Rodríguez MD  [facility] [address] [address]  [phone]         Requested Prescriptions   Pending Prescriptions Disp Refills     atorvastatin (LIPITOR) 10 MG tablet 90 tablet 3     Sig: Take 1 tablet (10 mg) by mouth daily       Statins Protocol Passed - 4/9/2019  3:23 PM        Passed - LDL on file in past 12 months     Recent Labs   Lab Test 04/12/18  1214   LDL 97             Passed - No abnormal creatine kinase in past 12 months     No lab results found.             Passed - Recent (12 mo) or future (30 days) visit within the authorizing provider's specialty     Patient had office visit in the last 12 months or has a visit in the next 30 days with authorizing provider or within the authorizing provider's specialty.  See \"Patient Info\" tab in inbasket, or \"Choose Columns\" in Meds & Orders section of the refill encounter.              Passed - Medication is active on med list        Passed - Patient is age " "18 or older        irbesartan (AVAPRO) 150 MG tablet 180 tablet 1     Sig: Take 2 tablets (300 mg) by mouth daily       Angiotensin-II Receptors Passed - 4/9/2019  3:23 PM        Passed - Blood pressure under 140/90 in past 12 months     BP Readings from Last 3 Encounters:   04/12/18 126/82   01/05/18 138/88   12/18/17 146/84                 Passed - Recent (12 mo) or future (30 days) visit within the authorizing provider's specialty     Patient had office visit in the last 12 months or has a visit in the next 30 days with authorizing provider or within the authorizing provider's specialty.  See \"Patient Info\" tab in inbasket, or \"Choose Columns\" in Meds & Orders section of the refill encounter.              Passed - Medication is active on med list        Passed - Patient is age 18 or older        Passed - Normal serum creatinine on file in past 12 months     Recent Labs   Lab Test 04/12/18  1214   CR 0.72             Passed - Normal serum potassium on file in past 12 months     Recent Labs   Lab Test 04/12/18  1214   POTASSIUM 3.6                      "

## 2019-04-10 NOTE — TELEPHONE ENCOUNTER
Reason for Call:  Other prescription    Detailed comments: [facility] called and has a shortage in irbesartan (AVAPRO) 150 MG tablet         180 ta*1            Sig: Take 2 tablets (300 mg) by mouth daily    Need to know if it is okay to switch to 1 tablet of 300 to take 1 per day??    Phone Number Patient can be reached at: Other phone number:  [phone]    Best Time: any    Can we leave a detailed message on this number? YES    Call taken on 4/10/2019 at 3:18 PM by Kay Menendez

## 2019-04-23 DIAGNOSIS — E78.5 HYPERLIPIDEMIA LDL GOAL <130: ICD-10-CM

## 2019-04-23 LAB
ALBUMIN SERPL-MCNC: 4.1 G/DL (ref 3.4–5)
ALP SERPL-CCNC: 67 U/L (ref 40–150)
ALT SERPL W P-5'-P-CCNC: 43 U/L (ref 0–70)
ANION GAP SERPL CALCULATED.3IONS-SCNC: 6 MMOL/L (ref 3–14)
AST SERPL W P-5'-P-CCNC: 21 U/L (ref 0–45)
BILIRUB SERPL-MCNC: 0.9 MG/DL (ref 0.2–1.3)
BUN SERPL-MCNC: 14 MG/DL (ref 7–30)
CALCIUM SERPL-MCNC: 9.2 MG/DL (ref 8.5–10.1)
CHLORIDE SERPL-SCNC: 101 MMOL/L (ref 94–109)
CHOLEST SERPL-MCNC: 158 MG/DL
CO2 SERPL-SCNC: 29 MMOL/L (ref 20–32)
CREAT SERPL-MCNC: 0.84 MG/DL (ref 0.66–1.25)
ERYTHROCYTE [DISTWIDTH] IN BLOOD BY AUTOMATED COUNT: 13.1 % (ref 10–15)
GFR SERPL CREATININE-BSD FRML MDRD: >90 ML/MIN/{1.73_M2}
GLUCOSE SERPL-MCNC: 104 MG/DL (ref 70–99)
HCT VFR BLD AUTO: 48.3 % (ref 40–53)
HDLC SERPL-MCNC: 40 MG/DL
HGB BLD-MCNC: 17.2 G/DL (ref 13.3–17.7)
LDLC SERPL CALC-MCNC: 69 MG/DL
MCH RBC QN AUTO: 30.9 PG (ref 26.5–33)
MCHC RBC AUTO-ENTMCNC: 35.6 G/DL (ref 31.5–36.5)
MCV RBC AUTO: 87 FL (ref 78–100)
NONHDLC SERPL-MCNC: 118 MG/DL
PLATELET # BLD AUTO: 228 10E9/L (ref 150–450)
POTASSIUM SERPL-SCNC: 3.7 MMOL/L (ref 3.4–5.3)
PROT SERPL-MCNC: 6.9 G/DL (ref 6.8–8.8)
PSA SERPL-ACNC: 2.27 UG/L (ref 0–4)
RBC # BLD AUTO: 5.56 10E12/L (ref 4.4–5.9)
SODIUM SERPL-SCNC: 136 MMOL/L (ref 133–144)
TRIGL SERPL-MCNC: 244 MG/DL
WBC # BLD AUTO: 4.9 10E9/L (ref 4–11)

## 2019-04-23 PROCEDURE — 80053 COMPREHEN METABOLIC PANEL: CPT | Performed by: INTERNAL MEDICINE

## 2019-04-23 PROCEDURE — G0103 PSA SCREENING: HCPCS | Performed by: INTERNAL MEDICINE

## 2019-04-23 PROCEDURE — 36415 COLL VENOUS BLD VENIPUNCTURE: CPT | Performed by: INTERNAL MEDICINE

## 2019-04-23 PROCEDURE — 80061 LIPID PANEL: CPT | Performed by: INTERNAL MEDICINE

## 2019-04-23 PROCEDURE — 85027 COMPLETE CBC AUTOMATED: CPT | Performed by: INTERNAL MEDICINE

## 2019-04-23 NOTE — LETTER
[address] [facility]  [address]  [place]  [address]  Tel: [phone]    April 29, 2019    Chrystal Cruzrader  [address] [place] [address]        Dear Samantha Will,    The following letter pertains to your most recent diagnostic tests:    -Your prostate specific antigen (PSA) test result returned normal.     -Liver and gallbladder tests are normal for you. (ALT,AST, Alk phos, bilirubin), kidney function is normal for you (Creatinine, GFR), Sodium is normal, Potassium is normal for you, Calcium is normal for you, Glucose (blood sugar) is normal for you.      -Your cholesterol panel looks healthy with the exception of elevated triglycerides.  Reducing carbohydrates and fats in your diet, losing weight and consuming less alcohol can improve your triglyceride levels.     -Your complete blood counts including your hemoglobin returned normal for you.     If you have any further questions or problems, please contact our office.      Sincerely,    Lam Rodríguez MD/ALTA          Enclosure: Lab Results  Results for orders placed or performed in visit on 04/23/19   **Comprehensive metabolic panel FUTURE anytime   Result Value Ref Range    Sodium 136 133 - 144 mmol/L    Potassium 3.7 3.4 - 5.3 mmol/L    Chloride 101 94 - 109 mmol/L    Carbon Dioxide 29 20 - 32 mmol/L    Anion Gap 6 3 - 14 mmol/L    Glucose 104 (H) 70 - 99 mg/dL    Urea Nitrogen 14 7 - 30 mg/dL    Creatinine 0.84 0.66 - 1.25 mg/dL    GFR Estimate >90 >60 mL/min/[1.73_m2]    GFR Estimate If Black >90 >60 mL/min/[1.73_m2]    Calcium 9.2 8.5 - 10.1 mg/dL    Bilirubin Total 0.9 0.2 - 1.3 mg/dL    Albumin 4.1 3.4 - 5.0 g/dL    Protein Total 6.9 6.8 - 8.8 g/dL    Alkaline Phosphatase 67 40 - 150 U/L    ALT 43 0 - 70 U/L    AST 21 0 - 45 U/L   **CBC with platelets FUTURE anytime   Result Value Ref Range    WBC 4.9 4.0 - 11.0 10e9/L    RBC Count 5.56 4.4 - 5.9 10e12/L    Hemoglobin 17.2 13.3 - 17.7 g/dL    Hematocrit 48.3 40.0 - 53.0 %     MCV 87 78 - 100 fl    MCH 30.9 26.5 - 33.0 pg    MCHC 35.6 31.5 - 36.5 g/dL    RDW 13.1 10.0 - 15.0 %    Platelet Count 228 150 - 450 10e9/L   Lipid panel reflex to direct LDL Fasting   Result Value Ref Range    Cholesterol 158 <200 mg/dL    Triglycerides 244 (H) <150 mg/dL    HDL Cholesterol 40 >39 mg/dL    LDL Cholesterol Calculated 69 <100 mg/dL    Non HDL Cholesterol 118 <130 mg/dL   PSA, screen   Result Value Ref Range    PSA 2.27 0 - 4 ug/L

## 2019-04-26 ENCOUNTER — OFFICE VISIT (OUTPATIENT)
Dept: FAMILY MEDICINE | Facility: CLINIC | Age: 69
End: 2019-04-26
Payer: COMMERCIAL

## 2019-04-26 VITALS
BODY MASS INDEX: 32.64 KG/M2 | HEIGHT: 73 IN | HEART RATE: 66 BPM | TEMPERATURE: 97.3 F | DIASTOLIC BLOOD PRESSURE: 77 MMHG | SYSTOLIC BLOOD PRESSURE: 128 MMHG | WEIGHT: 246.3 LBS | OXYGEN SATURATION: 95 %

## 2019-04-26 DIAGNOSIS — I10 BENIGN ESSENTIAL HYPERTENSION: ICD-10-CM

## 2019-04-26 DIAGNOSIS — C02.9 SQUAMOUS CELL CARCINOMA OF TONGUE (H): ICD-10-CM

## 2019-04-26 DIAGNOSIS — E78.5 HYPERLIPIDEMIA LDL GOAL <100: ICD-10-CM

## 2019-04-26 DIAGNOSIS — R68.89 INTOLERANCE TO COLD: ICD-10-CM

## 2019-04-26 DIAGNOSIS — Z00.00 ROUTINE GENERAL MEDICAL EXAMINATION AT A HEALTH CARE FACILITY: Primary | ICD-10-CM

## 2019-04-26 PROCEDURE — G0438 PPPS, INITIAL VISIT: HCPCS | Performed by: INTERNAL MEDICINE

## 2019-04-26 ASSESSMENT — MIFFLIN-ST. JEOR: SCORE: 1938.15

## 2019-04-26 ASSESSMENT — ACTIVITIES OF DAILY LIVING (ADL): CURRENT_FUNCTION: NO ASSISTANCE NEEDED

## 2019-04-26 NOTE — PROGRESS NOTES
SUBJECTIVE:   Chrystal Solis is a 68 year old male who presents for Preventive Visit.    Vision Rt 10/20, L 10/20, Both 10/16  Healthy Habits:     In general, how would you rate your overall health?  Good    Frequency of exercise:  6-7 days/week    Duration of exercise:  Greater than 60 minutes    Eating a health diet: 2-3 per day.    Taking medications regularly:  Yes    Barriers to taking medications:  None    Medication side effects:  None    Ability to successfully perform activities of daily living:  No assistance needed    Home Safety:  Lack of grab bars in the bathroom    Hearing impairment symptoms: borderline hearing loss.    In the past 6 months, have you been bothered by leaking of urine?  No    In general, how would you rate your overall mental or emotional health?  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 Yes    Do you have a Health Care Directive? No: Advance care planning reviewed with patient; information given to patient to review.    Fall risk  Fallen 2 or more times in the past year?: No  Any fall with injury in the past year?: No    Cognitive Screening   1) Repeat 3 items (Leader, Season, Table)    2) Clock draw: NORMAL  3) 3 item recall: Recalls 1 object   Results: NORMAL clock, 1-2 items recalled: COGNITIVE IMPAIRMENT LESS LIKELY    Mini-CogTM Copyright CASSANDRA Smith. Licensed by the author for use in [facility]; reprinted with permission (josefa@.[facility]). All rights reserved.          Reviewed and updated as needed this visit by clinical staff  Tobacco  Allergies  Meds  Med Hx  Surg Hx  Fam Hx  Soc Hx        Reviewed and updated as needed this visit by Provider  Tobacco  Med Hx  Surg Hx  Fam Hx  Soc  Hx       Social History     Tobacco Use     Smoking status: Former Smoker     Packs/day: 0.25     Years: 2.00     Pack years: 0.50     Types: Cigarettes     Last attempt to quit: 1978     Years since quittin.3     Smokeless tobacco: Never Used     Tobacco comment: smoked socially in college   Substance Use Topics     Alcohol use: Yes     Alcohol/week: 0.0 oz     Comment: 2 drinks per day     If you drink alcohol do you typically have >3 drinks per day or >7 drinks per week? No    Alcohol Use 2019   Prescreen: >3 drinks/day or >7 drinks/week? No         Current providers sharing in care for this patient include:   Patient Care Team:  Lam Rodríguez MD as PCP - General (Internal Medicine)  Lam Rodríguez MD as Assigned PCP    The following health maintenance items are reviewed in Epic and correct as of today:  Health Maintenance   Topic Date Due     INFLUENZA VACCINE (1) 2018     PHQ-2  2019     MEDICARE ANNUAL WELLNESS VISIT  2019     FALL RISK ASSESSMENT  2019     DTAP/TDAP/TD IMMUNIZATION (2 - Td) 2022     ADVANCE DIRECTIVE PLANNING Q5 YRS  2022     LIPID SCREEN Q5 YR MALE (SYSTEM ASSIGNED)  2024     COLON CANCER SCREEN (SYSTEM ASSIGNED)  2027     ZOSTER IMMUNIZATION  Completed     AORTIC ANEURYSM SCREENING (SYSTEM ASSIGNED)  Completed     HEPATITIS C SCREENING  Completed     IPV IMMUNIZATION  Aged Out     MENINGITIS IMMUNIZATION  Aged Out     Patient Active Problem List   Diagnosis     Squamous cell carcinoma of tongue (H)     Benign essential hypertension     Hyperlipidemia LDL goal <100     Peripheral polyneuropathy     Advanced directives, counseling/discussion     Past Surgical History:   Procedure Laterality Date     BIOPSY       COLONOSCOPY  3/2009     COLONOSCOPY N/A 3/8/2017    Procedure: COMBINED COLONOSCOPY, SINGLE OR MULTIPLE BIOPSY/POLYPECTOMY BY BIOPSY;  Surgeon: Yung Maguire MD;  Location:  GI     ENT SURGERY       EXCISE  LESION TRUNK N/A 2/3/2017    Procedure: EXCISE LESION TRUNK;  Surgeon: Jaswinder Lopez MD;  Location:  SD     EXCISE LESION UPPER EXTREMITY Left 2/3/2017    Procedure: EXCISE LESION UPPER EXTREMITY;  Surgeon: Jaswinder Lopez MD;  Location:  SD     HEAD & NECK SURGERY       HERNIA REPAIR Left      ORTHOPEDIC SURGERY  2002    torn ligament right shoulder     SOFT TISSUE SURGERY  12/6/10    excisional biopsy of tongue lesion     tonsillectomy  Age 6       Social History     Tobacco Use     Smoking status: Former Smoker     Packs/day: 0.25     Years: 2.00     Pack years: 0.50     Types: Cigarettes     Last attempt to quit: 1978     Years since quittin.3     Smokeless tobacco: Never Used     Tobacco comment: smoked socially in college   Substance Use Topics     Alcohol use: Yes     Alcohol/week: 0.0 oz     Comment: 2 drinks per day     Family History   Problem Relation Age of Onset     Cerebrovascular Disease Mother      Hypertension Mother      Respiratory Father         heavy smoker     Cancer Maternal Grandfather         lip cancer--heavy smoker     Unknown/Adopted Son      Unknown/Adopted Daughter          Current Outpatient Medications   Medication Sig Dispense Refill     amLODIPine (NORVASC) 10 MG tablet Take 1 tablet (10 mg) by mouth daily 90 tablet 3     atorvastatin (LIPITOR) 10 MG tablet Take 1 tablet (10 mg) by mouth daily 90 tablet 0     Elastic Bandages & Supports (JOBST ACTIVE 20-30MMHG MEDIUM) MISC 1 packet daily as needed 1 each 0     hydrochlorothiazide (HYDRODIURIL) 25 MG tablet Take 1 tablet (25 mg) by mouth as needed 90 tablet 3     irbesartan (AVAPRO) 300 MG tablet Take 1 tablet (300 mg) by mouth daily 90 tablet 3     order for DME Equipment being ordered: right thumb spica splint 1 Device 0     sildenafil (VIAGRA) 25 MG tablet Take 1 tablet (25 mg) by mouth daily as needed 30 min to 4 hrs before sex. Do not use with nitroglycerin, terazosin or doxazosin. 12 tablet 11  "    No Known Allergies      Review of Systems  Constitutional, HEENT, cardiovascular, pulmonary, gi and gu systems are negative, except as otherwise noted.    OBJECTIVE:   /77 (BP Location: Right arm, Cuff Size: Adult Large)   Pulse 66   Temp 97.3  F (36.3  C) (Oral)   Ht 1.842 m (6' 0.5\")   Wt 111.7 kg (246 lb 4.8 oz)   SpO2 95%   BMI 32.95 kg/m   Estimated body mass index is 32.95 kg/m  as calculated from the following:    Height as of this encounter: 1.842 m (6' 0.5\").    Weight as of this encounter: 111.7 kg (246 lb 4.8 oz).  Physical Exam  GENERAL: healthy, alert and no distress  EYES: Eyes grossly normal to inspection, PERRL and conjunctivae and sclerae normal  HENT: ear canals and TM's normal, nose and mouth without ulcers or lesions  NECK: no adenopathy, no asymmetry, masses, or scars and thyroid normal to palpation  RESP: lungs clear to auscultation - no rales, rhonchi or wheezes  CV: regular rate and rhythm, normal S1 S2, no S3 or S4, no murmur, click or rub, no peripheral edema and peripheral pulses strong  ABDOMEN: soft, nontender, no hepatosplenomegaly, no masses and bowel sounds normal  RECTAL: normal sphincter tone, no rectal masses, prostate normal size, smooth, nontender without nodules or masses  MS: no gross musculoskeletal defects noted, no edema  SKIN: no suspicious lesions or rashes  NEURO: Normal strength and tone, mentation intact and speech normal  PSYCH: mentation appears normal, affect normal/bright    Diagnostic Test Results:  Results for orders placed or performed in visit on 04/23/19   **Comprehensive metabolic panel FUTURE anytime   Result Value Ref Range    Sodium 136 133 - 144 mmol/L    Potassium 3.7 3.4 - 5.3 mmol/L    Chloride 101 94 - 109 mmol/L    Carbon Dioxide 29 20 - 32 mmol/L    Anion Gap 6 3 - 14 mmol/L    Glucose 104 (H) 70 - 99 mg/dL    Urea Nitrogen 14 7 - 30 mg/dL    Creatinine 0.84 0.66 - 1.25 mg/dL    GFR Estimate >90 >60 mL/min/[1.73_m2]    GFR Estimate If " Black >90 >60 mL/min/[1.73_m2]    Calcium 9.2 8.5 - 10.1 mg/dL    Bilirubin Total 0.9 0.2 - 1.3 mg/dL    Albumin 4.1 3.4 - 5.0 g/dL    Protein Total 6.9 6.8 - 8.8 g/dL    Alkaline Phosphatase 67 40 - 150 U/L    ALT 43 0 - 70 U/L    AST 21 0 - 45 U/L   **CBC with platelets FUTURE anytime   Result Value Ref Range    WBC 4.9 4.0 - 11.0 10e9/L    RBC Count 5.56 4.4 - 5.9 10e12/L    Hemoglobin 17.2 13.3 - 17.7 g/dL    Hematocrit 48.3 40.0 - 53.0 %    MCV 87 78 - 100 fl    MCH 30.9 26.5 - 33.0 pg    MCHC 35.6 31.5 - 36.5 g/dL    RDW 13.1 10.0 - 15.0 %    Platelet Count 228 150 - 450 10e9/L   Lipid panel reflex to direct LDL Fasting   Result Value Ref Range    Cholesterol 158 <200 mg/dL    Triglycerides 244 (H) <150 mg/dL    HDL Cholesterol 40 >39 mg/dL    LDL Cholesterol Calculated 69 <100 mg/dL    Non HDL Cholesterol 118 <130 mg/dL   PSA, screen   Result Value Ref Range    PSA 2.27 0 - 4 ug/L       ASSESSMENT / PLAN:   1. Routine general medical examination at a health care facility      2. Squamous cell carcinoma of tongue (H)  Reminded patient to schedule surveillance exam with ENT at [place]    3. Benign essential hypertension  Under good control on current medications    4. Hyperlipidemia LDL goal <100  On statin therapy with LDL at goal    5. Intolerance to cold  Can come in to have TFTs if he desires   - **TSH with free T4 reflex FUTURE anytime; Future    End of Life Planning:  Patient currently has an advanced directive: No.  I have verified the patient's ablity to prepare an advanced directive/make health care decisions.  Literature was provided to assist patient in preparing an advanced directive.    COUNSELING:  Reviewed preventive health counseling, as reflected in patient instructions  Special attention given to:       Consider AAA screening for ages 65-75 and smoking history; done       Regular exercise       Healthy diet/nutrition       Immunizations    Vaccines are up to date            Consider lung cancer  "screening for ages 55-80 years and 30 pack-year smoking history ; more than 15 years since he quit smoking        Colon cancer screening; repeat colonoscopy in 2022 due to polyp in 2017       Prostate cancer screening; PSA normal     BP Readings from Last 1 Encounters:   04/26/19 128/77     Estimated body mass index is 32.95 kg/m  as calculated from the following:    Height as of this encounter: 1.842 m (6' 0.5\").    Weight as of this encounter: 111.7 kg (246 lb 4.8 oz).      Weight management plan: Discussed healthy diet and exercise guidelines     reports that he quit smoking about 41 years ago. His smoking use included cigarettes. He has a 0.50 pack-year smoking history. He has never used smokeless tobacco.      Appropriate preventive services were discussed with this patient, including applicable screening as appropriate for cardiovascular disease, diabetes, osteopenia/osteoporosis, and glaucoma.  As appropriate for age/gender, discussed screening for colorectal cancer, prostate cancer, breast cancer, and cervical cancer. Checklist reviewing preventive services available has been given to the patient.    Reviewed patients plan of care and provided an AVS. The Basic Care Plan (routine screening as documented in Health Maintenance) for Chrystal meets the Care Plan requirement. This Care Plan has been established and reviewed with the Patient.    Counseling Resources:  ATP IV Guidelines  Pooled Cohorts Equation Calculator  Breast Cancer Risk Calculator  FRAX Risk Assessment  ICSI Preventive Guidelines  Dietary Guidelines for Americans, 2010  USDA's MyPlate  ASA Prophylaxis  Lung CA Screening    Lam Rodríguez MD  [facility]    Identified Health Risks:  "

## 2019-04-27 NOTE — RESULT ENCOUNTER NOTE
The following letter pertains to your most recent diagnostic tests:    -Your prostate specific antigen (PSA) test result returned normal.     -Liver and gallbladder tests are normal for you. (ALT,AST, Alk phos, bilirubin), kidney function is normal for you (Creatinine, GFR), Sodium is normal, Potassium is normal for you, Calcium is normal for you, Glucose (blood sugar) is normal for you.      -Your cholesterol panel looks healthy with the exception of elevated triglycerides.  Reducing carbohydrates and fats in your diet, losing weight and consuming less alcohol can improve your triglyceride levels.     -Your complete blood counts including your hemoglobin returned normal for you.           Sincerely,    Dr. Rodríguez

## 2019-05-06 DIAGNOSIS — I10 BENIGN ESSENTIAL HYPERTENSION: ICD-10-CM

## 2019-05-06 NOTE — TELEPHONE ENCOUNTER
"amLODIPine (NORVASC) 10 MG tablet    Last Written Prescription Date:  04/02/2018  Last Fill Quantity: 90,  # refills: 3   Last office visit: 4/26/2019 with prescribing provider:  Anne   Future Office Visit:  04/26/2020    Requested Prescriptions   Pending Prescriptions Disp Refills     amLODIPine (NORVASC) 10 MG tablet 90 tablet 3     Sig: Take 1 tablet (10 mg) by mouth daily       Calcium Channel Blockers Protocol  Passed - 5/6/2019  3:53 PM        Passed - Blood pressure under 140/90 in past 12 months     BP Readings from Last 3 Encounters:   04/26/19 128/77   04/12/18 126/82   01/05/18 138/88                 Passed - Recent (12 mo) or future (30 days) visit within the authorizing provider's specialty     Patient had office visit in the last 12 months or has a visit in the next 30 days with authorizing provider or within the authorizing provider's specialty.  See \"Patient Info\" tab in inbasket, or \"Choose Columns\" in Meds & Orders section of the refill encounter.              Passed - Medication is active on med list        Passed - Patient is age 18 or older        Passed - Normal serum creatinine on file in past 12 months     Recent Labs   Lab Test 04/23/19  0731   CR 0.84               "

## 2019-05-08 RX ORDER — AMLODIPINE BESYLATE 10 MG/1
10 TABLET ORAL DAILY
Qty: 90 TABLET | Refills: 3 | Status: SHIPPED | OUTPATIENT
Start: 2019-05-08 | End: 2020-05-28

## 2019-05-08 NOTE — TELEPHONE ENCOUNTER
Prescription approved per [facility] [place].    Erlinda JACOBSON RN

## 2019-05-21 ENCOUNTER — TRANSFERRED RECORDS (OUTPATIENT)
Dept: HEALTH INFORMATION MANAGEMENT | Facility: CLINIC | Age: 69
End: 2019-05-21

## 2019-06-06 ENCOUNTER — TRANSFERRED RECORDS (OUTPATIENT)
Dept: HEALTH INFORMATION MANAGEMENT | Facility: CLINIC | Age: 69
End: 2019-06-06

## 2019-06-19 DIAGNOSIS — I10 BENIGN ESSENTIAL HYPERTENSION: ICD-10-CM

## 2019-06-19 RX ORDER — HYDROCHLOROTHIAZIDE 25 MG/1
TABLET ORAL
Qty: 90 TABLET | Refills: 2 | Status: SHIPPED | OUTPATIENT
Start: 2019-06-19 | End: 2020-06-18

## 2019-06-19 NOTE — TELEPHONE ENCOUNTER
"Hydrochlorothiazide       Last Written Prescription Date:  4/19/18  Last Fill Quantity: 90,   # refills: 3  Last Office Visit: 4/26/19  Future Office visit:       Requested Prescriptions   Pending Prescriptions Disp Refills     hydrochlorothiazide (HYDRODIURIL) 25 MG tablet [Pharmacy Med Name: HYDROCHLOROTHIAZIDE 25MG] 90 tablet 3     Sig: TAKE ONE TABLET DAILY AS NEEDED       Diuretics (Including Combos) Protocol Passed - 6/19/2019  9:14 AM        Passed - Blood pressure under 140/90 in past 12 months     BP Readings from Last 3 Encounters:   04/26/19 128/77   04/12/18 126/82   01/05/18 138/88                 Passed - Recent (12 mo) or future (30 days) visit within the authorizing provider's specialty     Patient had office visit in the last 12 months or has a visit in the next 30 days with authorizing provider or within the authorizing provider's specialty.  See \"Patient Info\" tab in inbasket, or \"Choose Columns\" in Meds & Orders section of the refill encounter.              Passed - Medication is active on med list        Passed - Patient is age 18 or older        Passed - Normal serum creatinine on file in past 12 months     Recent Labs   Lab Test 04/23/19  0731   CR 0.84              Passed - Normal serum potassium on file in past 12 months     Recent Labs   Lab Test 04/23/19  0731   POTASSIUM 3.7                    Passed - Normal serum sodium on file in past 12 months     Recent Labs   Lab Test 04/23/19  0731                 Amanuel CHARLES RN  "

## 2019-06-19 NOTE — TELEPHONE ENCOUNTER
Prescription approved per [facility].    Amanuel CHARLES RN

## 2019-06-28 DIAGNOSIS — E78.5 HYPERLIPIDEMIA LDL GOAL <130: ICD-10-CM

## 2019-06-28 NOTE — TELEPHONE ENCOUNTER
" atorvastatin (LIPITOR) 10 MG tablet    Last Written Prescription Date:  04/10/2019  Last Fill Quantity: 90,  # refills: 0   Last office visit: 4/26/2019 with prescribing provider:  Anne   Future Office Visit:  04/26/2020    Requested Prescriptions   Pending Prescriptions Disp Refills     atorvastatin (LIPITOR) 10 MG tablet [Pharmacy Med Name: ATORVASTATIN 10MG] 90 tablet 0     Sig: TAKE ONE TABLET DAILY       Statins Protocol Passed - 6/28/2019  1:28 PM        Passed - LDL on file in past 12 months     Recent Labs   Lab Test 04/23/19  0731   LDL 69             Passed - No abnormal creatine kinase in past 12 months     No lab results found.             Passed - Recent (12 mo) or future (30 days) visit within the authorizing provider's specialty     Patient had office visit in the last 12 months or has a visit in the next 30 days with authorizing provider or within the authorizing provider's specialty.  See \"Patient Info\" tab in inbasket, or \"Choose Columns\" in Meds & Orders section of the refill encounter.              Passed - Medication is active on med list        Passed - Patient is age 18 or older          "

## 2019-07-01 RX ORDER — ATORVASTATIN CALCIUM 10 MG/1
TABLET, FILM COATED ORAL
Qty: 90 TABLET | Refills: 2 | Status: SHIPPED | OUTPATIENT
Start: 2019-07-01 | End: 2020-06-18

## 2020-04-16 ENCOUNTER — TRANSFERRED RECORDS (OUTPATIENT)
Dept: HEALTH INFORMATION MANAGEMENT | Facility: CLINIC | Age: 70
End: 2020-04-16

## 2020-05-14 ENCOUNTER — TRANSFERRED RECORDS (OUTPATIENT)
Dept: HEALTH INFORMATION MANAGEMENT | Facility: CLINIC | Age: 70
End: 2020-05-14

## 2020-06-18 ENCOUNTER — VIRTUAL VISIT (OUTPATIENT)
Dept: FAMILY MEDICINE | Facility: CLINIC | Age: 70
End: 2020-06-18
Payer: COMMERCIAL

## 2020-06-18 DIAGNOSIS — N52.9 MALE ERECTILE DISORDER: ICD-10-CM

## 2020-06-18 DIAGNOSIS — E78.5 HYPERLIPIDEMIA LDL GOAL <130: ICD-10-CM

## 2020-06-18 DIAGNOSIS — I10 BENIGN ESSENTIAL HYPERTENSION: ICD-10-CM

## 2020-06-18 PROCEDURE — 99214 OFFICE O/P EST MOD 30 MIN: CPT | Mod: TEL | Performed by: INTERNAL MEDICINE

## 2020-06-18 RX ORDER — ATORVASTATIN CALCIUM 10 MG/1
10 TABLET, FILM COATED ORAL DAILY
Qty: 90 TABLET | Refills: 3 | Status: SHIPPED | OUTPATIENT
Start: 2020-06-18 | End: 2021-06-28

## 2020-06-18 RX ORDER — SILDENAFIL 25 MG/1
100 TABLET, FILM COATED ORAL DAILY PRN
Qty: 16 TABLET | Refills: 11 | Status: SHIPPED | OUTPATIENT
Start: 2020-06-18 | End: 2020-06-18

## 2020-06-18 RX ORDER — IRBESARTAN 300 MG/1
300 TABLET ORAL DAILY
Qty: 90 TABLET | Refills: 3 | Status: SHIPPED | OUTPATIENT
Start: 2020-06-18 | End: 2021-06-28

## 2020-06-18 RX ORDER — AMLODIPINE BESYLATE 10 MG/1
10 TABLET ORAL DAILY
Qty: 90 TABLET | Refills: 3 | Status: SHIPPED | OUTPATIENT
Start: 2020-06-18 | End: 2021-06-28

## 2020-06-18 RX ORDER — SILDENAFIL 25 MG/1
100 TABLET, FILM COATED ORAL DAILY PRN
Qty: 16 TABLET | Refills: 11 | Status: SHIPPED | OUTPATIENT
Start: 2020-06-18 | End: 2023-06-28

## 2020-06-18 RX ORDER — HYDROCHLOROTHIAZIDE 25 MG/1
TABLET ORAL
Qty: 90 TABLET | Refills: 3 | Status: SHIPPED | OUTPATIENT
Start: 2020-06-18 | End: 2021-10-15

## 2020-06-18 NOTE — PROGRESS NOTES
"Chrystal Solis is a 69 year old male who is being evaluated via a billable telephone visit.      The patient has been notified of following:     \"This telephone visit will be conducted via a call between you and your physician/provider. We have found that certain health care needs can be provided without the need for a physical exam.  This service lets us provide the care you need with a short phone conversation.  If a prescription is necessary we can send it directly to your pharmacy.  If lab work is needed we can place an order for that and you can then stop by our lab to have the test done at a later time.    Telephone visits are billed at different rates depending on your insurance coverage. During this emergency period, for some insurers they may be billed the same as an in-person visit.  Please reach out to your insurance provider with any questions.    If during the course of the call the physician/provider feels a telephone visit is not appropriate, you will not be charged for this service.\"    Patient has given verbal consent for Telephone visit?  Yes    What phone number would you like to be contacted at? [phone]    How would you like to obtain your AVS? Mail a copy    Subjective     Crhystal Solis is a 69 year old male who presents via phone visit today for the following health issues:    HPI  CC:  follow up hypertension, hyperlipidemia, history of head and neck cancer, erection problems  Erection problems for several years getting worse, no libido problems, he tried levitra in the remote past success, requests prescription   He is physically active without chest pain or dyspnea   He has not been checking home blood pressure readings      Patient Active Problem List   Diagnosis     Squamous cell carcinoma of tongue (H)     Benign essential hypertension     Hyperlipidemia LDL goal <100     Peripheral polyneuropathy     Advanced directives, counseling/discussion     Past Surgical History: "   Procedure Laterality Date     BIOPSY       COLONOSCOPY  3/2009     COLONOSCOPY N/A 3/8/2017    Procedure: COMBINED COLONOSCOPY, SINGLE OR MULTIPLE BIOPSY/POLYPECTOMY BY BIOPSY;  Surgeon: Yung Maguire MD;  Location:  GI     ENT SURGERY       EXCISE LESION TRUNK N/A 2/3/2017    Procedure: EXCISE LESION TRUNK;  Surgeon: Jaswinder Lopez MD;  Location: [facility]     EXCISE LESION UPPER EXTREMITY Left 2/3/2017    Procedure: EXCISE LESION UPPER EXTREMITY;  Surgeon: Jaswinder Lopez MD;  Location: [facility]     HEAD & NECK SURGERY       HERNIA REPAIR Left      ORTHOPEDIC SURGERY  2002    torn ligament right shoulder     SOFT TISSUE SURGERY  12/6/10    excisional biopsy of tongue lesion     tonsillectomy  Age 6       Social History     Tobacco Use     Smoking status: Former Smoker     Packs/day: 0.25     Years: 2.00     Pack years: 0.50     Types: Cigarettes     Last attempt to quit: 1978     Years since quittin.4     Smokeless tobacco: Never Used     Tobacco comment: smoked socially in college   Substance Use Topics     Alcohol use: Yes     Alcohol/week: 0.0 standard drinks     Comment: 2 drinks per day     Family History   Problem Relation Age of Onset     Cerebrovascular Disease Mother      Hypertension Mother      Respiratory Father         heavy smoker     Cancer Maternal Grandfather         lip cancer--heavy smoker     Unknown/Adopted Son      Unknown/Adopted Daughter          Current Outpatient Medications   Medication Sig Dispense Refill     amLODIPine (NORVASC) 10 MG tablet Take 1 tablet (10 mg) by mouth daily 90 tablet 3     atorvastatin (LIPITOR) 10 MG tablet Take 1 tablet (10 mg) by mouth daily 90 tablet 3     hydrochlorothiazide (HYDRODIURIL) 25 MG tablet TAKE ONE TABLET DAILY AS NEEDED 90 tablet 3     irbesartan (AVAPRO) 300 MG tablet Take 1 tablet (300 mg) by mouth daily 90 tablet 3     sildenafil (VIAGRA) 25 MG tablet Take 4 tablets (100 mg) by mouth daily as needed 30 min to 4 hrs  before sex. Do not use with nitroglycerin, terazosin or doxazosin. 16 tablet 11     No Known Allergies    Reviewed and updated as needed this visit by Provider         Review of Systems   Constitutional, HEENT, cardiovascular, pulmonary, gi and gu systems are negative, except as otherwise noted.       Objective   Reported vitals:  There were no vitals taken for this visit.   He sounds healthy, alert and no distress  PSYCH: Alert and oriented times 3; coherent speech, normal   rate and volume, able to articulate logical thoughts, able   to abstract reason, no tangential thoughts, no hallucinations   or delusions  His affect is normal  RESP: No cough, no audible wheezing, able to talk in full sentences  Remainder of exam unable to be completed due to telephone visits            Assessment/Plan:  1. Male erectile disorder  Discussed common side effects and potential risks of viagra  He will take paper prescription to shop around for best price   - sildenafil (VIAGRA) 25 MG tablet; Take 4 tablets (100 mg) by mouth daily as needed 30 min to 4 hrs before sex. Do not use with nitroglycerin, terazosin or doxazosin.  Dispense: 16 tablet; Refill: 11    2. Hyperlipidemia LDL goal <130  On statin therapy; return for office visit appointment with labs when safer re COVID   - atorvastatin (LIPITOR) 10 MG tablet; Take 1 tablet (10 mg) by mouth daily  Dispense: 90 tablet; Refill: 3    3. Benign essential hypertension  He plans to buy a cuff and inform us if readings are 140/90; otherwise check in clinic when he returns   - hydrochlorothiazide (HYDRODIURIL) 25 MG tablet; TAKE ONE TABLET DAILY AS NEEDED  Dispense: 90 tablet; Refill: 3  - irbesartan (AVAPRO) 300 MG tablet; Take 1 tablet (300 mg) by mouth daily  Dispense: 90 tablet; Refill: 3  - amLODIPine (NORVASC) 10 MG tablet; Take 1 tablet (10 mg) by mouth daily  Dispense: 90 tablet; Refill: 3    Return in about 4 months (around 10/18/2020) for face to face preventive  exam.      Phone call duration:  23 minutes    Lam Rodríguez MD

## 2020-07-03 ENCOUNTER — VIRTUAL VISIT (OUTPATIENT)
Dept: FAMILY MEDICINE | Facility: CLINIC | Age: 70
End: 2020-07-03
Payer: COMMERCIAL

## 2020-07-03 DIAGNOSIS — R21 RASH: ICD-10-CM

## 2020-07-03 DIAGNOSIS — H10.9 CONJUNCTIVITIS OF BOTH EYES, UNSPECIFIED CONJUNCTIVITIS TYPE: ICD-10-CM

## 2020-07-03 DIAGNOSIS — H01.00B BLEPHARITIS OF UPPER AND LOWER EYELIDS OF BOTH EYES, UNSPECIFIED TYPE: Primary | ICD-10-CM

## 2020-07-03 DIAGNOSIS — H01.00A BLEPHARITIS OF UPPER AND LOWER EYELIDS OF BOTH EYES, UNSPECIFIED TYPE: Primary | ICD-10-CM

## 2020-07-03 PROCEDURE — 99214 OFFICE O/P EST MOD 30 MIN: CPT | Mod: 95 | Performed by: INTERNAL MEDICINE

## 2020-07-03 RX ORDER — TOBRAMYCIN 3 MG/ML
1-2 SOLUTION/ DROPS OPHTHALMIC
Qty: 5 ML | Refills: 0 | Status: SHIPPED | OUTPATIENT
Start: 2020-07-03 | End: 2022-01-25

## 2020-07-03 RX ORDER — PREDNISONE 20 MG/1
20 TABLET ORAL DAILY
Qty: 5 TABLET | Refills: 0 | Status: SHIPPED | OUTPATIENT
Start: 2020-07-03 | End: 2020-09-10

## 2020-07-03 NOTE — PROGRESS NOTES
"Chrystal Solis is a 69 year old male who is being evaluated via a billable video visit.      The patient has been notified of following:     \"This video visit will be conducted via a call between you and your physician/provider. We have found that certain health care needs can be provided without the need for an in-person physical exam.  This service lets us provide the care you need with a video conversation.  If a prescription is necessary we can send it directly to your pharmacy.  If lab work is needed we can place an order for that and you can then stop by our lab to have the test done at a later time.    Video visits are billed at different rates depending on your insurance coverage.  Please reach out to your insurance provider with any questions.    If during the course of the call the physician/provider feels a video visit is not appropriate, you will not be charged for this service.\"    Patient has given verbal consent for Video visit? Yes  How would you like to obtain your AVS? LeninClemmons  Patient would like the video invitation sent by: Text to cell phone: BRAYAN, Wife Maggi cell# [phone]  Will anyone else be joining your video visit? Yes, Maggi, wife    Subjective     Chrystal Solis is a 69 year old male who presents today via video visit for the following health issues:    HPI         Video Start Time: 15:25    Several days of progressive now moderate to severe   No fevers or chills, no new cough or coryza   Eyes are moderately itchy  Visual acuity intact    He also has a rash on his buttocks that has been present for 3 weeks   He thinks it might be poison ivy   He describes it as moderately itchy    Patient Active Problem List   Diagnosis     Squamous cell carcinoma of tongue (H)     Benign essential hypertension     Hyperlipidemia LDL goal <100     Peripheral polyneuropathy     Advanced directives, counseling/discussion     Past Surgical History:   Procedure Laterality Date     BIOPSY       " COLONOSCOPY  3/2009     COLONOSCOPY N/A 3/8/2017    Procedure: COMBINED COLONOSCOPY, SINGLE OR MULTIPLE BIOPSY/POLYPECTOMY BY BIOPSY;  Surgeon: Yung Maguire MD;  Location:  GI     ENT SURGERY       EXCISE LESION TRUNK N/A 2/3/2017    Procedure: EXCISE LESION TRUNK;  Surgeon: Jaswinder Lopez MD;  Location: [facility]     EXCISE LESION UPPER EXTREMITY Left 2/3/2017    Procedure: EXCISE LESION UPPER EXTREMITY;  Surgeon: Jaswinder Lopez MD;  Location: [facility]     HEAD & NECK SURGERY       HERNIA REPAIR Left      ORTHOPEDIC SURGERY  2002    torn ligament right shoulder     SOFT TISSUE SURGERY  12/6/10    excisional biopsy of tongue lesion     tonsillectomy  Age 6       Social History     Tobacco Use     Smoking status: Former Smoker     Packs/day: 0.25     Years: 2.00     Pack years: 0.50     Types: Cigarettes     Last attempt to quit: 1978     Years since quittin.5     Smokeless tobacco: Never Used     Tobacco comment: smoked socially in college   Substance Use Topics     Alcohol use: Yes     Alcohol/week: 0.0 standard drinks     Comment: 2 drinks per day     Family History   Problem Relation Age of Onset     Cerebrovascular Disease Mother      Hypertension Mother      Respiratory Father         heavy smoker     Cancer Maternal Grandfather         lip cancer--heavy smoker     Unknown/Adopted Son      Unknown/Adopted Daughter          Current Outpatient Medications   Medication Sig Dispense Refill     amLODIPine (NORVASC) 10 MG tablet Take 1 tablet (10 mg) by mouth daily 90 tablet 3     atorvastatin (LIPITOR) 10 MG tablet Take 1 tablet (10 mg) by mouth daily 90 tablet 3     hydrochlorothiazide (HYDRODIURIL) 25 MG tablet TAKE ONE TABLET DAILY AS NEEDED 90 tablet 3     irbesartan (AVAPRO) 300 MG tablet Take 1 tablet (300 mg) by mouth daily 90 tablet 3     predniSONE (DELTASONE) 20 MG tablet Take 1 tablet (20 mg) by mouth daily 5 tablet 0     sildenafil (VIAGRA) 25 MG tablet Take 4 tablets (100  mg) by mouth daily as needed 30 min to 4 hrs before sex. Do not use with nitroglycerin, terazosin or doxazosin. 16 tablet 11     tobramycin (TOBREX) 0.3 % ophthalmic solution Place 1-2 drops into both eyes every 2 hours 5 mL 0     No Known Allergies    Reviewed and updated as needed this visit by Provider         Review of Systems         Objective             Physical Exam     GENERAL: Healthy, alert and no distress  EYES: The video quality is very poor, his bilateral eyelids look mildly edematous and there is mild skin erythema, I cannot really visualize the conjunctiva but he describes them as 'red and bloodshot'  RESP: No audible wheeze, cough, or visible cyanosis.  No visible retractions or increased work of breathing.    SKIN: The video quality is poor there may be some diffuse erythema and excoriation  NEURO: Cranial nerves grossly intact.  Mentation and speech appropriate for age.  PSYCH: Mentation appears normal, affect normal/bright, judgement and insight intact, normal speech and appearance well-groomed.              Assessment & Plan       ICD-10-CM    1. Blepharitis of upper and lower eyelids of both eyes, unspecified type  H01.00A     H01.00B    2. Conjunctivitis of both eyes, unspecified conjunctivitis type  H10.9 tobramycin (TOBREX) 0.3 % ophthalmic solution   3. Rash  R21 predniSONE (DELTASONE) 20 MG tablet     Recommended no tear baby shampoo lid washes 3-4 times per day and warm compresses to both eyes  Can use trobrex drops for conjunctival symptoms and to prevent superimposed infection  Call if no better after one week or sooner if symptoms worsen despite those recommendations    The rash may very well be a contact dermatitis from poison ivy  I honestly told him it was difficult to accurately diagnosis due to poor video quality  I think there would be little downside to a short course of prednisone, however, to treat if it is poison ivy; he has used this in the past with success  If that does  not resolve rash, he will contact me and we would likely have to arrange for a face to face visit to further evaluate      Due to multiple problems with video and audio connections this was a prolonged visit  Return in about 4 months (around 10/21/2020) for Preventive Visit.    Lam Rodríguez MD  [facility]      Video-Visit Details    Type of service:  Video Visit    Video End Time:3:56 PM    Originating Location (pt. Location): Home    Distant Location (provider location):  [facility]     Platform used for Video Visit: Doximity    Return in about 4 months (around 10/21/2020) for Preventive Visit.  Patient instructed to return to clinic or contact us sooner if symptoms worsen or new symptoms develop.         Lam Rodríguez MD

## 2020-09-09 DIAGNOSIS — R21 RASH: ICD-10-CM

## 2020-09-10 RX ORDER — PREDNISONE 20 MG/1
20 TABLET ORAL DAILY
Qty: 5 TABLET | Refills: 0 | Status: SHIPPED | OUTPATIENT
Start: 2020-09-10 | End: 2020-10-21

## 2020-09-10 NOTE — TELEPHONE ENCOUNTER
Routing refill request to provider for review/approval because:  Drug not on the [facility] refill protocol   Please authorize if appropriate.  Thanks,  Suzy Euceda RN

## 2020-09-25 ENCOUNTER — OFFICE VISIT (OUTPATIENT)
Dept: URGENT CARE | Facility: URGENT CARE | Age: 70
End: 2020-09-25
Payer: COMMERCIAL

## 2020-09-25 ENCOUNTER — VIRTUAL VISIT (OUTPATIENT)
Dept: URGENT CARE | Facility: CLINIC | Age: 70
End: 2020-09-25
Payer: COMMERCIAL

## 2020-09-25 ENCOUNTER — NURSE TRIAGE (OUTPATIENT)
Dept: NURSING | Facility: CLINIC | Age: 70
End: 2020-09-25

## 2020-09-25 ENCOUNTER — ANCILLARY PROCEDURE (OUTPATIENT)
Dept: GENERAL RADIOLOGY | Facility: CLINIC | Age: 70
End: 2020-09-25
Attending: PREVENTIVE MEDICINE
Payer: COMMERCIAL

## 2020-09-25 VITALS
TEMPERATURE: 98.7 F | RESPIRATION RATE: 18 BRPM | DIASTOLIC BLOOD PRESSURE: 77 MMHG | OXYGEN SATURATION: 98 % | SYSTOLIC BLOOD PRESSURE: 137 MMHG | HEART RATE: 87 BPM

## 2020-09-25 DIAGNOSIS — R06.02 SOB (SHORTNESS OF BREATH): ICD-10-CM

## 2020-09-25 DIAGNOSIS — R30.0 DYSURIA: Primary | ICD-10-CM

## 2020-09-25 LAB
ALBUMIN SERPL-MCNC: 3.4 G/DL (ref 3.4–5)
ALBUMIN UR-MCNC: 100 MG/DL
ALP SERPL-CCNC: 71 U/L (ref 40–150)
ALT SERPL W P-5'-P-CCNC: 30 U/L (ref 0–70)
ANION GAP SERPL CALCULATED.3IONS-SCNC: 9 MMOL/L (ref 3–14)
APPEARANCE UR: CLEAR
AST SERPL W P-5'-P-CCNC: 14 U/L (ref 0–45)
BACTERIA #/AREA URNS HPF: ABNORMAL /HPF
BASOPHILS # BLD AUTO: 0 10E9/L (ref 0–0.2)
BASOPHILS NFR BLD AUTO: 0.3 %
BILIRUB SERPL-MCNC: 1 MG/DL (ref 0.2–1.3)
BILIRUB UR QL STRIP: ABNORMAL
BUN SERPL-MCNC: 15 MG/DL (ref 7–30)
CALCIUM SERPL-MCNC: 9.3 MG/DL (ref 8.5–10.1)
CHLORIDE SERPL-SCNC: 104 MMOL/L (ref 94–109)
CK SERPL-CCNC: 105 U/L (ref 30–300)
CO2 SERPL-SCNC: 24 MMOL/L (ref 20–32)
COLOR UR AUTO: YELLOW
CREAT SERPL-MCNC: 0.92 MG/DL (ref 0.66–1.25)
CRP SERPL HS-MCNC: 101 MG/L
DIFFERENTIAL METHOD BLD: NORMAL
EOSINOPHIL # BLD AUTO: 0.2 10E9/L (ref 0–0.7)
EOSINOPHIL NFR BLD AUTO: 2 %
ERYTHROCYTE [DISTWIDTH] IN BLOOD BY AUTOMATED COUNT: 13.2 % (ref 10–15)
GFR SERPL CREATININE-BSD FRML MDRD: 84 ML/MIN/{1.73_M2}
GLUCOSE SERPL-MCNC: 110 MG/DL (ref 70–99)
GLUCOSE UR STRIP-MCNC: NEGATIVE MG/DL
HCT VFR BLD AUTO: 47.9 % (ref 40–53)
HGB BLD-MCNC: 16.5 G/DL (ref 13.3–17.7)
HGB UR QL STRIP: ABNORMAL
KETONES UR STRIP-MCNC: ABNORMAL MG/DL
LEUKOCYTE ESTERASE UR QL STRIP: ABNORMAL
LYMPHOCYTES # BLD AUTO: 1.1 10E9/L (ref 0.8–5.3)
LYMPHOCYTES NFR BLD AUTO: 11.5 %
MCH RBC QN AUTO: 29.9 PG (ref 26.5–33)
MCHC RBC AUTO-ENTMCNC: 34.4 G/DL (ref 31.5–36.5)
MCV RBC AUTO: 87 FL (ref 78–100)
MONOCYTES # BLD AUTO: 1.2 10E9/L (ref 0–1.3)
MONOCYTES NFR BLD AUTO: 11.8 %
NEUTROPHILS # BLD AUTO: 7.3 10E9/L (ref 1.6–8.3)
NEUTROPHILS NFR BLD AUTO: 74.4 %
NITRATE UR QL: NEGATIVE
NON-SQ EPI CELLS #/AREA URNS LPF: ABNORMAL /LPF
NT-PROBNP SERPL-MCNC: 63 PG/ML (ref 0–125)
PH UR STRIP: 6 PH (ref 5–7)
PLATELET # BLD AUTO: 222 10E9/L (ref 150–450)
POTASSIUM SERPL-SCNC: 3.5 MMOL/L (ref 3.4–5.3)
PROT SERPL-MCNC: 7.3 G/DL (ref 6.8–8.8)
RBC # BLD AUTO: 5.52 10E12/L (ref 4.4–5.9)
RBC #/AREA URNS AUTO: ABNORMAL /HPF
RENAL EPI CELLS #/AREA URNS HPF: ABNORMAL /HPF
SODIUM SERPL-SCNC: 137 MMOL/L (ref 133–144)
SOURCE: ABNORMAL
SP GR UR STRIP: 1.02 (ref 1–1.03)
TROPONIN I SERPL-MCNC: <0.015 UG/L (ref 0–0.04)
UROBILINOGEN UR STRIP-ACNC: 2 EU/DL (ref 0.2–1)
WBC # BLD AUTO: 9.8 10E9/L (ref 4–11)
WBC #/AREA URNS AUTO: ABNORMAL /HPF

## 2020-09-25 PROCEDURE — 81001 URINALYSIS AUTO W/SCOPE: CPT | Performed by: PREVENTIVE MEDICINE

## 2020-09-25 PROCEDURE — 86141 C-REACTIVE PROTEIN HS: CPT | Performed by: PREVENTIVE MEDICINE

## 2020-09-25 PROCEDURE — 83880 ASSAY OF NATRIURETIC PEPTIDE: CPT | Performed by: PREVENTIVE MEDICINE

## 2020-09-25 PROCEDURE — 86769 SARS-COV-2 COVID-19 ANTIBODY: CPT | Performed by: PREVENTIVE MEDICINE

## 2020-09-25 PROCEDURE — 85025 COMPLETE CBC W/AUTO DIFF WBC: CPT | Performed by: PREVENTIVE MEDICINE

## 2020-09-25 PROCEDURE — 80053 COMPREHEN METABOLIC PANEL: CPT | Performed by: PREVENTIVE MEDICINE

## 2020-09-25 PROCEDURE — 96372 THER/PROPH/DIAG INJ SC/IM: CPT | Performed by: PREVENTIVE MEDICINE

## 2020-09-25 PROCEDURE — 36415 COLL VENOUS BLD VENIPUNCTURE: CPT | Performed by: PREVENTIVE MEDICINE

## 2020-09-25 PROCEDURE — 99214 OFFICE O/P EST MOD 30 MIN: CPT | Mod: 25 | Performed by: PREVENTIVE MEDICINE

## 2020-09-25 PROCEDURE — 71046 X-RAY EXAM CHEST 2 VIEWS: CPT

## 2020-09-25 PROCEDURE — 93000 ELECTROCARDIOGRAM COMPLETE: CPT | Performed by: PREVENTIVE MEDICINE

## 2020-09-25 PROCEDURE — 84484 ASSAY OF TROPONIN QUANT: CPT | Performed by: PREVENTIVE MEDICINE

## 2020-09-25 PROCEDURE — 82550 ASSAY OF CK (CPK): CPT | Performed by: PREVENTIVE MEDICINE

## 2020-09-25 RX ORDER — CEFTRIAXONE SODIUM 1 G
1 VIAL (EA) INJECTION ONCE
Status: COMPLETED | OUTPATIENT
Start: 2020-09-25 | End: 2020-09-25

## 2020-09-25 RX ORDER — CIPROFLOXACIN 500 MG/1
500 TABLET, FILM COATED ORAL 2 TIMES DAILY
Qty: 14 TABLET | Refills: 0 | Status: SHIPPED | OUTPATIENT
Start: 2020-09-25 | End: 2020-10-02

## 2020-09-25 RX ADMIN — Medication 1 G: at 17:22

## 2020-09-25 NOTE — TELEPHONE ENCOUNTER
Chrystal has been sick since Monday afternoon with a fever between 100.5 to 102.5.  Chrystal had a covid test and was negative.  Chrystal has diarrhea and productive cough and runny nose and aches and pains and shortness of breath.  Chrystal is having painful urination and upper bladder is hurting.  Today Chrystal denies a fever so far.  Chrystal also states that his is having incontinence.  Patient is urinating more frequently.      COVID 19 Nurse Triage Plan/Patient Instructions    Please be aware that novel coronavirus (COVID-19) may be circulating in the community. If you develop symptoms such as fever, cough, or SOB or if you have concerns about the presence of another infection including coronavirus (COVID-19), please contact your health care provider or visit [email or web address].     Disposition/Instructions    Virtual Visit with provider recommended. Reference Visit Selection Guide.    Thank you for taking steps to prevent the spread of this virus.  o Limit your contact with others.  o Wear a simple mask to cover your cough.  o Wash your hands well and often.    Resources    [facility]: About COVID-19: [email or web address]    CDC: What to Do If You're Sick: www.cdc.gov/coronavirus/2019-ncov/about/steps-when-sick.html    CDC: Ending Home Isolation: www.cdc.gov/coronavirus/2019-ncov/hcp/disposition-in-home-patients.html     CDC: Caring for Someone: www.cdc.gov/coronavirus/2019-ncov/if-you-are-sick/care-for-someone.html     [facility]: Interim Guidance for Hospital Discharge to Home: www.health.[email or web address].[place]/diseases/coronavirus/hcp/hospdischarge.pdf    [facility] clinical trials (COVID-19 research studies): [email or web address]     Below are the COVID-19 hotlines at the [place] Department of Health ([facility]). Interpreters are available.   o For health questions: Call 278-764-0067 or 1-753.264.2229 (7 a.m. to 7 p.m.)  o For questions about schools and childcare: Call 419-426-1122 or  1-723.616.2397 (7 a.m. to 7 p.m.)                       Additional Information    Negative: Shock suspected (e.g., cold/pale/clammy skin, too weak to stand, low BP, rapid pulse)    Negative: Sounds like a life-threatening emergency to the triager    Negative: [1] Unable to urinate (or only a few drops) > 4 hours AND     [2] bladder feels very full (e.g., palpable bladder or strong urge to urinate)    Negative: [1] Decreased urination and [2] drinking very little AND [2] dehydration suspected (e.g., dark urine, no urine > 12 hours, very dry mouth, very lightheaded)    Negative: Patient sounds very sick or weak to the triager    Negative: Side (flank) or lower back pain present    [1] Can't control passage of urine (i.e., urinary incontinence) AND [2] new onset (< 2 weeks) or worsening    Protocols used: URINARY SYMPTOMS-A-AH

## 2020-09-25 NOTE — PATIENT INSTRUCTIONS
UTI - ceftriaxone 1 g IM in clinic, ciprofloxacin 500 mg 2 times per day for 7 days, urine culture pending  Flu like symptoms - may be related to uti, covid negative yesterday, isolate until feeling better  Will check covid serology given flu like illness in January 2020.  SOB - suspect related to flu like illness, subjective dyspnia, ekg, cxr, ambulatory o2 normal, no tachypnea, troponin, cbc pending  Treat uti and have close follow up

## 2020-09-25 NOTE — PROGRESS NOTES
SUBJECTIVE:   Chrystal Solis is a 69 year old male who  presents today for a possible UTI. Symptoms of dysuria and frequency have been going on for 2day(s).  Hematuria no.  sudden onsetand moderate.  There is no history of fever, chills, nausea or vomiting.  No history of vaginal or penile discharge. This patient does not have a history of urinary tract infections. Patient denies long duration, rigors, flank pain, Vomiting, significant nausea or diarrhea, taking Coumadin and GFR less than 30 within the last year.  Has had low grade fever up to 100.5.  Also had achiness, fatigue earlier in the weak.  Tested negative for covid yesterday - pcr test.  No sick contacts although [place] 2 [place] last [place].  One was coughing.  Had sob, cough, fever, achiness in January 2020, wonders if he had covid then.  Did not get tested for the flu at that time.  Today has some sob, but mostly feels tired with dysuria and frequency.    Past Medical History:   Diagnosis Date     Benign essential hypertension 1/11/2017     Bronchitis      Hearing loss, mixed, bilateral      Hyperlipidemia LDL goal <130 1/11/2017     Hypertension      Intervertebral cervical disc disorder with myelopathy, cervical region 2006    had injections     Malignant neoplasm (H) 12/6/10    squamous cell of tongue      Numbness and tingling     numbness in toes     Peripheral polyneuropathy 1/11/2017     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     Current Outpatient Medications   Medication Sig Dispense Refill     amLODIPine (NORVASC) 10 MG tablet Take 1 tablet (10 mg) by mouth daily 90 tablet 3     atorvastatin (LIPITOR) 10 MG tablet Take 1 tablet (10 mg) by mouth daily 90 tablet 3     ciprofloxacin (CIPRO) 500 MG tablet Take 1 tablet (500 mg) by mouth 2 times daily for 7 days 14 tablet 0     hydrochlorothiazide (HYDRODIURIL) 25 MG tablet TAKE ONE TABLET DAILY AS NEEDED 90 tablet 3     irbesartan (AVAPRO) 300 MG tablet Take 1 tablet  (300 mg) by mouth daily 90 tablet 3     sildenafil (VIAGRA) 25 MG tablet Take 4 tablets (100 mg) by mouth daily as needed 30 min to 4 hrs before sex. Do not use with nitroglycerin, terazosin or doxazosin. 16 tablet 11     tobramycin (TOBREX) 0.3 % ophthalmic solution Place 1-2 drops into both eyes every 2 hours 5 mL 0     predniSONE (DELTASONE) 20 MG tablet TAKE 1 TABLET (20 MG) BY MOUTH DAILY 5 tablet 0     Social History     Tobacco Use     Smoking status: Former Smoker     Packs/day: 0.25     Years: 2.00     Pack years: 0.50     Types: Cigarettes     Last attempt to quit: 1978     Years since quittin.7     Smokeless tobacco: Never Used     Tobacco comment: smoked socially in college   Substance Use Topics     Alcohol use: Yes     Alcohol/week: 0.0 standard drinks     Comment: 2 drinks per day       ROS:   Review of systems negative except as stated above.    OBJECTIVE:  /77   Pulse 87   Temp 98.7  F (37.1  C) (Oral)   Resp 18   SpO2 98%   GENERAL APPEARANCE: healthy, alert and no distress  RESP: lungs clear to auscultation - no rales, rhonchi or wheezes  CV: regular rates and rhythm, normal S1 S2, no murmur noted  ABDOMEN:  soft, nontender, no HSM or masses and bowel sounds normal  BACK: No CVA tenderness  SKIN: no suspicious lesions or rashes    CXR - no infiltrate, no edema, no effusion, normal cardiac silhouette  EKG - rate 89, nsr, no st or t wave changes, no q waves, nl intervals, 2 pvcs noted, no comparisons available  UA - 25-50 wbc, 5-10 rbc, many bacteria  COVID serology, troponin, ck, cbc, cmp, crp all pending    ASSESSMENT:   UTI - ceftriaxone 1 g IM in clinic, ciprofloxacin 500 mg 2 times per day for 7 days, urine culture pending    Flu like symptoms - may be related to uti, covid negative yesterday, isolate until feeling better, push fluids, rest, tylenol if fever or achy  Will check covid serology given flu like illness in 2020.    SOB - pt non toxic appearing, suspect  related to flu like illness, subjective dyspnia, ekg, cxr, 02/hr/bp/ambulatory o2 all normal, no tachypnea, troponin, cbc pending  Treat uti and have close follow up    PLAN:  As per ordered above  Drink plenty of fluids.  Prevention and treatment of UTI's discussed.Signs and symptoms of pyelonephritis mentioned.  Follow up with primary care physician if not improving

## 2020-09-29 LAB
COVID-19 SPIKE RBD ABY TITER: NORMAL
COVID-19 SPIKE RBD ABY: NEGATIVE

## 2020-10-21 ENCOUNTER — OFFICE VISIT (OUTPATIENT)
Dept: FAMILY MEDICINE | Facility: CLINIC | Age: 70
End: 2020-10-21
Payer: COMMERCIAL

## 2020-10-21 VITALS
BODY MASS INDEX: 30.72 KG/M2 | TEMPERATURE: 96.9 F | HEIGHT: 73 IN | WEIGHT: 231.8 LBS | HEART RATE: 65 BPM | SYSTOLIC BLOOD PRESSURE: 122 MMHG | OXYGEN SATURATION: 97 % | DIASTOLIC BLOOD PRESSURE: 71 MMHG

## 2020-10-21 DIAGNOSIS — C02.9 SQUAMOUS CELL CARCINOMA OF TONGUE (H): ICD-10-CM

## 2020-10-21 DIAGNOSIS — N39.0 URINARY TRACT INFECTION WITH HEMATURIA, SITE UNSPECIFIED: ICD-10-CM

## 2020-10-21 DIAGNOSIS — R31.9 URINARY TRACT INFECTION WITH HEMATURIA, SITE UNSPECIFIED: ICD-10-CM

## 2020-10-21 DIAGNOSIS — Z00.00 ROUTINE GENERAL MEDICAL EXAMINATION AT A HEALTH CARE FACILITY: Primary | ICD-10-CM

## 2020-10-21 DIAGNOSIS — I10 BENIGN ESSENTIAL HYPERTENSION: ICD-10-CM

## 2020-10-21 DIAGNOSIS — N52.9 MALE ERECTILE DISORDER: ICD-10-CM

## 2020-10-21 DIAGNOSIS — E78.5 HYPERLIPIDEMIA LDL GOAL <100: ICD-10-CM

## 2020-10-21 DIAGNOSIS — Z12.5 SCREENING FOR PROSTATE CANCER: ICD-10-CM

## 2020-10-21 DIAGNOSIS — G62.9 PERIPHERAL POLYNEUROPATHY: ICD-10-CM

## 2020-10-21 LAB
ALBUMIN UR-MCNC: NEGATIVE MG/DL
APPEARANCE UR: CLEAR
BILIRUB UR QL STRIP: NEGATIVE
COLOR UR AUTO: YELLOW
GLUCOSE UR STRIP-MCNC: NEGATIVE MG/DL
HGB UR QL STRIP: NEGATIVE
KETONES UR STRIP-MCNC: NEGATIVE MG/DL
LEUKOCYTE ESTERASE UR QL STRIP: NEGATIVE
NITRATE UR QL: NEGATIVE
PH UR STRIP: 5.5 PH (ref 5–7)
SOURCE: NORMAL
SP GR UR STRIP: 1.02 (ref 1–1.03)
UROBILINOGEN UR STRIP-ACNC: 0.2 EU/DL (ref 0.2–1)

## 2020-10-21 PROCEDURE — 81003 URINALYSIS AUTO W/O SCOPE: CPT | Performed by: INTERNAL MEDICINE

## 2020-10-21 PROCEDURE — 99213 OFFICE O/P EST LOW 20 MIN: CPT | Mod: 25 | Performed by: INTERNAL MEDICINE

## 2020-10-21 PROCEDURE — 99397 PER PM REEVAL EST PAT 65+ YR: CPT | Performed by: INTERNAL MEDICINE

## 2020-10-21 PROCEDURE — G0103 PSA SCREENING: HCPCS | Performed by: INTERNAL MEDICINE

## 2020-10-21 PROCEDURE — 80061 LIPID PANEL: CPT | Performed by: INTERNAL MEDICINE

## 2020-10-21 PROCEDURE — 36415 COLL VENOUS BLD VENIPUNCTURE: CPT | Performed by: INTERNAL MEDICINE

## 2020-10-21 PROCEDURE — 84443 ASSAY THYROID STIM HORMONE: CPT | Performed by: INTERNAL MEDICINE

## 2020-10-21 ASSESSMENT — MIFFLIN-ST. JEOR: SCORE: 1867.7

## 2020-10-21 ASSESSMENT — ACTIVITIES OF DAILY LIVING (ADL): CURRENT_FUNCTION: NO ASSISTANCE NEEDED

## 2020-10-21 NOTE — LETTER
October 30, 2020      Chrystal RENEE CruzWill  [address] [place] [address]        Dear ,    The following letter pertains to your most recent diagnostic tests:     -TSH (thyroid stimulating hormone) level is normal which indicates normal circulating thyroid hormone levels.       -Your cholesterol panel looks healthy.     -The prostate cancer screening blood test is suprisingly high.       -The urine analysis looks healthy.         Bottom line:  Sometimes a urinary tract infection involving the prostate can cause the PSA to elevate.   Since you were recently treated for a UTI, this might explain the PSA elevation.  I recommend another check in 2 weeks or so.  If the PSA is trending downward, then infection is most likely.  If the PSA is trending upward or staying the same, then I think you should see a urologist and decide if we need to investigate further for prostate cancer.         Follow up:  Lab appointment in 2 weeks to recheck the PSA.     Resulted Orders   Lipid panel reflex to direct LDL Fasting   Result Value Ref Range    Cholesterol 125 <200 mg/dL    Triglycerides 123 <150 mg/dL    HDL Cholesterol 43 >39 mg/dL    LDL Cholesterol Calculated 57 <100 mg/dL      Comment:      Desirable:       <100 mg/dl    Non HDL Cholesterol 82 <130 mg/dL   Prostate spec antigen screen   Result Value Ref Range    PSA 10.20 (H) 0 - 4 ug/L      Comment:      Assay Method:  Chemiluminescence using Siemens Vista analyzer   UA reflex to Microscopic and Culture   Result Value Ref Range    Color Urine Yellow     Appearance Urine Clear     Glucose Urine Negative NEG^Negative mg/dL    Bilirubin Urine Negative NEG^Negative    Ketones Urine Negative NEG^Negative mg/dL    Specific Gravity Urine 1.025 1.003 - 1.035    Blood Urine Negative NEG^Negative    pH Urine 5.5 5.0 - 7.0 pH    Protein Albumin Urine Negative NEG^Negative mg/dL    Urobilinogen Urine 0.2 0.2 - 1.0 EU/dL    Nitrite Urine Negative NEG^Negative     Leukocyte Esterase Urine Negative NEG^Negative    Source Midstream Urine    TSH with free T4 reflex   Result Value Ref Range    TSH 3.33 0.40 - 4.00 mU/L       If you have any questions or concerns, please call the clinic at the number listed above.       Sincerely,        Lam Rodríguez MD  echo

## 2020-10-21 NOTE — PROGRESS NOTES
"SUBJECTIVE:   Chrystal Solis is a 69 year old male who presents for Preventive Visit.    Patient has been advised of split billing requirements and indicates understanding: Yes   Are you in the first 12 months of your Medicare coverage?  No    Healthy Habits:     In general, how would you rate your overall health?  Very good    Frequency of exercise:  2-3 days/week    Duration of exercise:  45-60 minutes    Do you usually eat at least 4 servings of fruit and vegetables a day, include whole grains    & fiber and avoid regularly eating high fat or \"junk\" foods?  No    Taking medications regularly:  Yes    Barriers to taking medications:  None    Medication side effects:  None    Ability to successfully perform activities of daily living:  No assistance needed    Home Safety:  No safety concerns identified    Hearing Impairment:  Difficulty following a conversation in a noisy restaurant or crowded room, feel that people are mumbling or not speaking clearly, find that men's voices are easier to understand than woman's and difficulty understanding soft or whispered speech    In the past 6 months, have you been bothered by leaking of urine?  No    In general, how would you rate your overall mental or emotional health?  Good      PHQ-2 Total Score: 0    Additional concerns today:  No      Last month treated for UTI  He had dyspnea then too  He had extensive work up  Urinary symptoms got better  He had microhematuria   His cardiac CRP was high, but this was in setting of being treated for UTI; his dyspnea symptom have resolved and he has not chest symptoms  He is on statin therapy      Do you feel safe in your environment? Yes    Have you ever done Advance Care Planning? (For example, a Health Directive, POLST, or a discussion with a medical provider or your loved ones about your wishes): Yes, patient states has an Advance Care Planning document and will bring a copy to the clinic.      Fall risk  Fallen 2 or more times " in the past year?: No  Any fall with injury in the past year?: Yes    Cognitive Screening - PATIENT DECLINED       Mini-CogTM Copyright CASSANDRA Smith. Licensed by the author for use in [facility]; reprinted with permission (josefa@[facility]). All rights reserved.      Do you have sleep apnea, excessive snoring or daytime drowsiness?: no    Reviewed and updated as needed this visit by clinical staff  Tobacco  Allergies  Meds   Med Hx  Surg Hx  Fam Hx          Reviewed and updated as needed this visit by Provider  Tobacco     Med Hx  Surg Hx  Fam Hx         Social History     Tobacco Use     Smoking status: Former Smoker     Packs/day: 0.25     Years: 2.00     Pack years: 0.50     Types: Cigarettes     Quit date: 1978     Years since quittin.8     Smokeless tobacco: Never Used     Tobacco comment: smoked socially in college   Substance Use Topics     Alcohol use: Yes     Alcohol/week: 0.0 standard drinks     Comment: 2 drinks per day     If you drink alcohol do you typically have >3 drinks per day or >7 drinks per week? No    Alcohol Use 10/21/2020   Prescreen: >3 drinks/day or >7 drinks/week? No       Current providers sharing in care for this patient include:   Patient Care Team:  Lam Rodríguez MD as PCP - General (Internal Medicine)  Lam Rodríguez MD as Assigned PCP    The following health maintenance items are reviewed in Epic and correct as of today:  Health Maintenance   Topic Date Due     FALL RISK ASSESSMENT  2021     MEDICARE ANNUAL WELLNESS VISIT  10/21/2021     COLORECTAL CANCER SCREENING  2022     DTAP/TDAP/TD IMMUNIZATION (2 - Td) 2022     LIPID  2024     ADVANCE CARE PLANNING  10/21/2025     HEPATITIS C SCREENING  Completed     PHQ-2  Completed     INFLUENZA VACCINE  Completed     Pneumococcal Vaccine: 65+ Years  Completed     ZOSTER IMMUNIZATION  Completed     AORTIC ANEURYSM SCREENING (SYSTEM ASSIGNED)  Completed     Pneumococcal Vaccine: Pediatrics  (0 to 5 Years) and At-Risk Patients (6 to 64 Years)  Aged Out     IPV IMMUNIZATION  Aged Out     MENINGITIS IMMUNIZATION  Aged Out     HEPATITIS B IMMUNIZATION  Aged Out     Patient Active Problem List   Diagnosis     Squamous cell carcinoma of tongue (H)     Benign essential hypertension     Hyperlipidemia LDL goal <100     Peripheral polyneuropathy     Advanced directives, counseling/discussion     Past Surgical History:   Procedure Laterality Date     BIOPSY       COLONOSCOPY  3/2009     COLONOSCOPY N/A 3/8/2017    Procedure: COMBINED COLONOSCOPY, SINGLE OR MULTIPLE BIOPSY/POLYPECTOMY BY BIOPSY;  Surgeon: Yung Maguire MD;  Location:  GI     ENT SURGERY       EXCISE LESION TRUNK N/A 2/3/2017    Procedure: EXCISE LESION TRUNK;  Surgeon: Jaswinder Lopez MD;  Location: [facility]     EXCISE LESION UPPER EXTREMITY Left 2/3/2017    Procedure: EXCISE LESION UPPER EXTREMITY;  Surgeon: Jaswinder Lopez MD;  Location: [facility]     HEAD & NECK SURGERY       HERNIA REPAIR Left      ORTHOPEDIC SURGERY  2002    torn ligament right shoulder     SOFT TISSUE SURGERY  12/6/10    excisional biopsy of tongue lesion     tonsillectomy  Age 6       Social History     Tobacco Use     Smoking status: Former Smoker     Packs/day: 0.25     Years: 2.00     Pack years: 0.50     Types: Cigarettes     Quit date: 1978     Years since quittin.8     Smokeless tobacco: Never Used     Tobacco comment: smoked socially in college   Substance Use Topics     Alcohol use: Yes     Alcohol/week: 0.0 standard drinks     Comment: 2 drinks per day     Family History   Problem Relation Age of Onset     Cerebrovascular Disease Mother      Hypertension Mother      Respiratory Father         heavy smoker     Cancer Maternal Grandfather         lip cancer--heavy smoker     Unknown/Adopted Son      Unknown/Adopted Daughter          Current Outpatient Medications   Medication Sig Dispense Refill     amLODIPine (NORVASC) 10 MG tablet Take 1  "tablet (10 mg) by mouth daily 90 tablet 3     atorvastatin (LIPITOR) 10 MG tablet Take 1 tablet (10 mg) by mouth daily 90 tablet 3     hydrochlorothiazide (HYDRODIURIL) 25 MG tablet TAKE ONE TABLET DAILY AS NEEDED 90 tablet 3     irbesartan (AVAPRO) 300 MG tablet Take 1 tablet (300 mg) by mouth daily 90 tablet 3     sildenafil (VIAGRA) 25 MG tablet Take 4 tablets (100 mg) by mouth daily as needed 30 min to 4 hrs before sex. Do not use with nitroglycerin, terazosin or doxazosin. 16 tablet 11     tobramycin (TOBREX) 0.3 % ophthalmic solution Place 1-2 drops into both eyes every 2 hours 5 mL 0     Allergies   Allergen Reactions     Gadolinium Nausea and Vomiting     Unknown per patient if it is an allergy         Review of Systems  Constitutional, HEENT, cardiovascular, pulmonary, gi and gu systems are negative, except as otherwise noted.    OBJECTIVE:   /71 (BP Location: Right arm, Patient Position: Sitting, Cuff Size: Adult Regular)   Pulse 65   Temp 96.9  F (36.1  C) (Temporal)   Ht 1.842 m (6' 0.52\")   Wt 105.1 kg (231 lb 12.8 oz)   SpO2 97%   BMI 30.99 kg/m   Estimated body mass index is 30.99 kg/m  as calculated from the following:    Height as of this encounter: 1.842 m (6' 0.52\").    Weight as of this encounter: 105.1 kg (231 lb 12.8 oz).  Physical Exam  GENERAL: healthy, alert and no distress  EYES: Eyes grossly normal to inspection, PERRL and conjunctivae and sclerae normal  HENT: ear canals and TM's normal, nose and mouth without ulcers or lesions  NECK: no adenopathy, no asymmetry, masses, or scars and thyroid normal to palpation  RESP: lungs clear to auscultation - no rales, rhonchi or wheezes  CV: regular rate and rhythm, normal S1 S2, no S3 or S4, no murmur, click or rub, no peripheral edema and peripheral pulses strong  ABDOMEN: soft, nontender, no hepatosplenomegaly, no masses and bowel sounds normal  RECTAL: normal sphincter tone, no rectal masses, prostate normal size, smooth, nontender " "without nodules or masses  MS: no gross musculoskeletal defects noted, no edema  SKIN: no suspicious lesions or rashes  NEURO: Normal strength and tone, mentation intact and speech normal  PSYCH: mentation appears normal, affect normal/bright    Labs pending     ASSESSMENT / PLAN:   1. Routine general medical examination at a health care facility      2. Squamous cell carcinoma of tongue (H)  [place] recommended annual TSH check ;his insurance no longer allows visits to [place]; he would like to return to seeing Dr. Hagen for surveillance   - TSH with free T4 reflex    3. Benign essential hypertension  Well controlled     4. Hyperlipidemia LDL goal <100  On statin therapy; recheck; not sure what to make of CRP elevation in setting of UTI  - Lipid panel reflex to direct LDL Fasting    5. Peripheral polyneuropathy      6. Urinary tract infection with hematuria, site unspecified  Not sure why this occurred  Recheck UA, if hematuria present then I think a urology consult is indicated  - UA reflex to Microscopic and Culture    7. Male erectile disorder  He got an prescription viagra by virtual visit this summer     8. Screening for prostate cancer    - Prostate spec antigen screen    Patient has been advised of split billing requirements and indicates understanding: Yes  COUNSELING:  Reviewed preventive health counseling, as reflected in patient instructions  Special attention given to:       Consider AAA screening for ages 65-75 and smoking history; done        Regular exercise       Healthy diet/nutrition       Immunizations    Vaccines are up to date          Hepatitis C screening; done       Consider lung cancer screening for ages 55-80 years and 30 pack-year smoking history ; he quit 42 years ago       Colon cancer screening; repeat in 2022       Prostate cancer screening; PSA today    Estimated body mass index is 30.99 kg/m  as calculated from the following:    Height as of this encounter: 1.842 m (6' 0.52\").    Weight " as of this encounter: 105.1 kg (231 lb 12.8 oz).    Weight management plan: Discussed healthy diet and exercise guidelines    He reports that he quit smoking about 42 years ago. His smoking use included cigarettes. He has a 0.50 pack-year smoking history. He has never used smokeless tobacco.      Appropriate preventive services were discussed with this patient, including applicable screening as appropriate for cardiovascular disease, diabetes, osteopenia/osteoporosis, and glaucoma.  As appropriate for age/gender, discussed screening for colorectal cancer, prostate cancer, breast cancer, and cervical cancer. Checklist reviewing preventive services available has been given to the patient.    Reviewed patients plan of care and provided an AVS. The Basic Care Plan (routine screening as documented in Health Maintenance) for [place] meets the Care Plan requirement. This Care Plan has been established and reviewed with the Patient.    Counseling Resources:  ATP IV Guidelines  Pooled Cohorts Equation Calculator  Breast Cancer Risk Calculator  Breast Cancer: Medication to Reduce Risk  FRAX Risk Assessment  ICSI Preventive Guidelines  Dietary Guidelines for Americans, 2010  USDA's MyPlate  ASA Prophylaxis  Lung CA Screening    Lam Rodríguez MD  [facility]    Identified Health Risks:

## 2020-10-22 LAB
CHOLEST SERPL-MCNC: 125 MG/DL
HDLC SERPL-MCNC: 43 MG/DL
LDLC SERPL CALC-MCNC: 57 MG/DL
NONHDLC SERPL-MCNC: 82 MG/DL
PSA SERPL-ACNC: 10.2 UG/L (ref 0–4)
TRIGL SERPL-MCNC: 123 MG/DL
TSH SERPL DL<=0.005 MIU/L-ACNC: 3.33 MU/L (ref 0.4–4)

## 2020-10-23 NOTE — RESULT ENCOUNTER NOTE
The following letter pertains to your most recent diagnostic tests:    -TSH (thyroid stimulating hormone) level is normal which indicates normal circulating thyroid hormone levels.      -Your cholesterol panel looks healthy.     -The prostate cancer screening blood test is suprisingly high.       -The urine analysis looks healthy.        Bottom line:  Sometimes a urinary tract infection involving the prostate can cause the PSA to elevate.   Since you were recently treated for a UTI, this might explain the PSA elevation.  I recommend another check in 2 weeks or so.  If the PSA is trending downward, then infection is most likely.  If the PSA is trending upward or staying the same, then I think you should see a urologist and decide if we need to investigate further for prostate cancer.         Follow up:  Lab appointment in 2 weeks to recheck the PSA.      Sincerely,    Dr. Rdoríguez

## 2020-10-23 NOTE — RESULT ENCOUNTER NOTE
Can you please help him schedule a follow up lab appointment for his PSA in about 2 weeks as per my result letter ?

## 2020-10-28 ENCOUNTER — DOCUMENTATION ONLY (OUTPATIENT)
Dept: FAMILY MEDICINE | Facility: CLINIC | Age: 70
End: 2020-10-28

## 2020-10-28 DIAGNOSIS — R97.20 ELEVATED PROSTATE SPECIFIC ANTIGEN (PSA): Primary | ICD-10-CM

## 2020-10-28 NOTE — PROGRESS NOTES
There are no orders for this patient for the upcoming lab visit. Please order labs as needed.     Reason for visit PSA recheck.    Thank you, lab.

## 2020-11-09 DIAGNOSIS — R97.20 ELEVATED PROSTATE SPECIFIC ANTIGEN (PSA): ICD-10-CM

## 2020-11-09 LAB — PSA SERPL-MCNC: 9.1 UG/L (ref 0–4)

## 2020-11-09 PROCEDURE — 36415 COLL VENOUS BLD VENIPUNCTURE: CPT | Performed by: INTERNAL MEDICINE

## 2020-11-09 PROCEDURE — 84153 ASSAY OF PSA TOTAL: CPT | Performed by: INTERNAL MEDICINE

## 2020-11-09 NOTE — LETTER
November 9, 2020      Chrystal Solis  [address] [place] [address]        Dear ,    We are writing to inform you of your test results.    Unfortunately, the PSA is remaining moderately elevated.  I would recommend discussing this result with a prostate specialist (urologist).   Sometimes additional specialized blood tests or even an MRI test or a biopsy can be helpful to determine if this lab abnormality represents a worrisome prostate cancer or not.     I would recommend meeting with Dr. Yosi Campbell.  Please call [facility] [phone] ([email or web address]) to schedule an appointment with Dr. Campbell from urology.         Sincerely,     Dr. Rodríguez     Resulted Orders   PSA tumor marker   Result Value Ref Range    PSA 9.10 (H) 0 - 4 ug/L      Comment:      Assay Method:  Chemiluminescence using Siemens Vista analyzer       If you have any questions or concerns, please call the clinic at the number listed above.       Sincerely,

## 2020-11-09 NOTE — RESULT ENCOUNTER NOTE
The following letter pertains to your most recent diagnostic tests:    Unfortunately, the PSA is remaining moderately elevated.  I would recommend discussing this result with a prostate specialist (urologist).   Sometimes additional specialized blood tests or even an MRI test or a biopsy can be helpful to determine if this lab abnormality represents a worrisome prostate cancer or not.   I would recommend meeting with Dr. Yosi Campbell.  Please call [facility]rology [facility] [phone] ([email or web address]) to schedule an appointment with Dr. Campbell from urology.         Sincerely,    Dr. Rodríguez

## 2020-11-13 ENCOUNTER — OFFICE VISIT (OUTPATIENT)
Dept: UROLOGY | Facility: CLINIC | Age: 70
End: 2020-11-13
Payer: COMMERCIAL

## 2020-11-13 VITALS
HEIGHT: 73 IN | SYSTOLIC BLOOD PRESSURE: 140 MMHG | BODY MASS INDEX: 29.82 KG/M2 | WEIGHT: 225 LBS | DIASTOLIC BLOOD PRESSURE: 78 MMHG

## 2020-11-13 DIAGNOSIS — N39.0 URINARY TRACT INFECTION WITHOUT HEMATURIA, SITE UNSPECIFIED: Primary | ICD-10-CM

## 2020-11-13 DIAGNOSIS — R97.20 ELEVATED PROSTATE SPECIFIC ANTIGEN (PSA): ICD-10-CM

## 2020-11-13 LAB
ALBUMIN UR-MCNC: NEGATIVE MG/DL
APPEARANCE UR: CLEAR
BILIRUB UR QL STRIP: NEGATIVE
COLOR UR AUTO: YELLOW
GLUCOSE UR STRIP-MCNC: NEGATIVE MG/DL
HGB UR QL STRIP: NEGATIVE
KETONES UR STRIP-MCNC: NEGATIVE MG/DL
LEUKOCYTE ESTERASE UR QL STRIP: NEGATIVE
NITRATE UR QL: NEGATIVE
PH UR STRIP: 7.5 PH (ref 5–7)
RESIDUAL VOLUME (RV) (EXTERNAL): 78
SOURCE: ABNORMAL
SP GR UR STRIP: 1.01 (ref 1–1.03)
UROBILINOGEN UR STRIP-ACNC: 0.2 EU/DL (ref 0.2–1)

## 2020-11-13 PROCEDURE — 81003 URINALYSIS AUTO W/O SCOPE: CPT | Performed by: UROLOGY

## 2020-11-13 PROCEDURE — 51798 US URINE CAPACITY MEASURE: CPT | Performed by: UROLOGY

## 2020-11-13 PROCEDURE — 99203 OFFICE O/P NEW LOW 30 MIN: CPT | Mod: 25 | Performed by: UROLOGY

## 2020-11-13 ASSESSMENT — PAIN SCALES - GENERAL: PAINLEVEL: NO PAIN (1)

## 2020-11-13 ASSESSMENT — MIFFLIN-ST. JEOR: SCORE: 1836.53

## 2020-11-13 NOTE — PROGRESS NOTES
[facility]  Main Office: [address]  [address]  [place] [address]       CHIEF COMPLAINT:  Elevated PSA    HISTORY:   I was asked by Dr Rodríguez to see this 69 year old gentleman who presents with an elevated PSA.  He had dysuria and a fever for 4 days and then had a urine sample checked at the end of September which revealed a likely urinary tract infection.  Urine culture was not sent at the time but he felt better after 2 weeks of antibiotics.  In October he then had a PSA checked that was elevated at 10.2.  Recheck in September went down somewhat at 9.1.  Prior to this his PSA had always been normal.  He has felt well since being treated with antibiotics.  He has no first-degree relatives with history of prostate cancer.  No history of gross hematuria.  At baseline he has no urinary complaints.      PAST MEDICAL HISTORY:   Past Medical History:   Diagnosis Date     Benign essential hypertension 1/11/2017     Bronchitis      Hearing loss, mixed, bilateral      Hyperlipidemia LDL goal <130 1/11/2017     Hypertension      Intervertebral cervical disc disorder with myelopathy, cervical region 2006    had injections     Malignant neoplasm (H) 12/6/10    squamous cell of tongue      Numbness and tingling     numbness in toes     Peripheral polyneuropathy 1/11/2017     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       PAST SURGICAL HISTORY:   Past Surgical History:   Procedure Laterality Date     BIOPSY       COLONOSCOPY  3/2009     COLONOSCOPY N/A 3/8/2017    Procedure: COMBINED COLONOSCOPY, SINGLE OR MULTIPLE BIOPSY/POLYPECTOMY BY BIOPSY;  Surgeon: Yung Maguire MD;  Location:  GI     ENT SURGERY       EXCISE LESION TRUNK N/A 2/3/2017    Procedure: EXCISE LESION TRUNK;  Surgeon: Jaswinder Lopez MD;  Location: [facility]     EXCISE LESION UPPER EXTREMITY Left 2/3/2017    Procedure: EXCISE LESION UPPER EXTREMITY;  Surgeon: Jaswinder Lopez MD;  Location: [facility]     HEAD & NECK SURGERY        HERNIA REPAIR Left 2002     ORTHOPEDIC SURGERY  2002    torn ligament right shoulder     SOFT TISSUE SURGERY  12/6/10    excisional biopsy of tongue lesion     tonsillectomy  Age 6       FAMILY HISTORY:   Family History   Problem Relation Age of Onset     Cerebrovascular Disease Mother      Hypertension Mother      Respiratory Father         heavy smoker     Cancer Maternal Grandfather         lip cancer--heavy smoker     Unknown/Adopted Son      Unknown/Adopted Daughter        SOCIAL HISTORY:   Social History     Tobacco Use     Smoking status: Former Smoker     Packs/day: 0.25     Years: 2.00     Pack years: 0.50     Types: Cigarettes     Quit date: 1978     Years since quittin.8     Smokeless tobacco: Never Used     Tobacco comment: smoked socially in [employer]   Substance Use Topics     Alcohol use: Yes     Alcohol/week: 0.0 standard drinks     Comment: 2 drinks per day          Allergies   Allergen Reactions     Gadolinium Nausea and Vomiting     Unknown per patient if it is an allergy         Current Outpatient Medications:      amLODIPine (NORVASC) 10 MG tablet, Take 1 tablet (10 mg) by mouth daily, Disp: 90 tablet, Rfl: 3     atorvastatin (LIPITOR) 10 MG tablet, Take 1 tablet (10 mg) by mouth daily, Disp: 90 tablet, Rfl: 3     hydrochlorothiazide (HYDRODIURIL) 25 MG tablet, TAKE ONE TABLET DAILY AS NEEDED, Disp: 90 tablet, Rfl: 3     irbesartan (AVAPRO) 300 MG tablet, Take 1 tablet (300 mg) by mouth daily, Disp: 90 tablet, Rfl: 3     sildenafil (VIAGRA) 25 MG tablet, Take 4 tablets (100 mg) by mouth daily as needed 30 min to 4 hrs before sex. Do not use with nitroglycerin, terazosin or doxazosin., Disp: 16 tablet, Rfl: 11     tobramycin (TOBREX) 0.3 % ophthalmic solution, Place 1-2 drops into both eyes every 2 hours, Disp: 5 mL, Rfl: 0    Review Of Systems:  Skin: No rash, pruritis, or skin pigmentation  Eyes: No changes in vision  Ears/Nose/Throat: No changes in hearing, no  nosebleeds  Respiratory: No shortness of breath, dyspnea on exertion, cough, or hemoptysis  Cardiovascular: No chest pain or palpitations  Gastrointestinal: No diarrhea or constipation. No abdominal pain. No hematochezia  Genitourinary: see HPI  Musculoskeletal: No pain or swelling of joints, normal range of motion  Neurologic: No weakness or tremors  Psychiatric: No recent changes in memory or mood  Hematologic/Lymphatic/Immunologic: No easy bruising or enlarged lymph nodes  Endocrine: No weight gain or loss      PHYSICAL EXAM:    There were no vitals taken for this visit.  General appearance: In NAD, conversant  HEENT: Normocephalic and atraumatic, anicteric sclera  Cardiovascular: Not examined  Respiratory: normal, non-labored breathing  Gastrointestinal: negative, Abdomen soft, non-tender, and non-distended.   Musculoskeletal: Not Examined  Peripheral Vascular/extremity: No peripheral edema  Skin: Normal temperature, turgor, and texture. No rash  Psychiatric: Appropriate affect, alert and oriented to person, place, and time    Penis: Normal with mild meatal stenosis  Scrotal skin: Normal, no lesions  Testicles: Normal to palpation bilaterally  Epididymis: Normal to palpation bilaterally  Lymphatic: Normal inguinal lymph nodes    Digital Rectal Exam: The prostate is somewhat enlarged, benign and symmetric to palpation    Cystoscopy: Not done      PSA: 9.1    UA RESULTS:  Recent Labs   Lab Test 10/21/20  0956 09/25/20  1538   COLOR Yellow Yellow   APPEARANCE Clear Clear   URINEGLC Negative Negative   URINEBILI Negative Small*   URINEKETONE Negative Trace*   SG 1.025 1.020   UBLD Negative Moderate*   URINEPH 5.5 6.0   PROTEIN Negative 100*   UROBILINOGEN 0.2 2.0*   NITRITE Negative Negative   LEUKEST Negative Small*   RBCU  --  5-10*   WBCU  --  25-50*       Bladder Scan: 78mL    Other Labs:      Imaging Studies: None      CLINICAL IMPRESSION:   Urinary tract infection, elevated PSA    PLAN:   His elevated PSA was  likely caused by urinary tract infection.  The PSA was checked about 4 weeks after his UTI.  It has come down somewhat.  I recommended that we continue to follow this closely to make certain the PSA resolves.  We will check the PSA again in 2 months.  He does not wish to do a virtual visit for follow-up so he will come in for the results.  The PSA continues to trend downwards we will continue to watch, if the PSA yamileth is an elevated level we will need to pursue a potential prostate cancer evaluation.      Yosi Campbell MD

## 2020-11-13 NOTE — LETTER
11/13/2020       RE: Chrystal Solis  [address] [place] [address]     Dear Colleague,    Thank you for referring your patient, Chrystal Solis, to the [facility]OLOG[facility] [place] at [facility]. Please see a copy of my visit note below.    [facility]  Main Office: [address]  [address]  [place] [address]       CHIEF COMPLAINT:  Elevated PSA    HISTORY:   I was asked by Dr Rodríguez to see this 69 year old gentleman who presents with an elevated PSA.  He had dysuria and a fever for 4 days and then had a urine sample checked at the end of September which revealed a likely urinary tract infection.  Urine culture was not sent at the time but he felt better after 2 weeks of antibiotics.  In October he then had a PSA checked that was elevated at 10.2.  Recheck in September went down somewhat at 9.1.  Prior to this his PSA had always been normal.  He has felt well since being treated with antibiotics.  He has no first-degree relatives with history of prostate cancer.  No history of gross hematuria.  At baseline he has no urinary complaints.      PAST MEDICAL HISTORY:   Past Medical History:   Diagnosis Date     Benign essential hypertension 1/11/2017     Bronchitis      Hearing loss, mixed, bilateral      Hyperlipidemia LDL goal <130 1/11/2017     Hypertension      Intervertebral cervical disc disorder with myelopathy, cervical region 2006    had injections     Malignant neoplasm (H) 12/6/10    squamous cell of tongue      Numbness and tingling     numbness in toes     Peripheral polyneuropathy 1/11/2017     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       PAST SURGICAL HISTORY:   Past Surgical History:   Procedure Laterality Date     BIOPSY       COLONOSCOPY  3/2009     COLONOSCOPY N/A 3/8/2017    Procedure: COMBINED COLONOSCOPY, SINGLE OR MULTIPLE BIOPSY/POLYPECTOMY BY BIOPSY;  Surgeon: Yung Maguire MD;  Location:  GI     ENT  SURGERY       EXCISE LESION TRUNK N/A 2/3/2017    Procedure: EXCISE LESION TRUNK;  Surgeon: Jaswinder Lopez MD;  Location:  [facility]     EXCISE LESION UPPER EXTREMITY Left 2/3/2017    Procedure: EXCISE LESION UPPER EXTREMITY;  Surgeon: Jaswinder Lopez MD;  Location: [facility]     HEAD & NECK SURGERY       HERNIA REPAIR Left      ORTHOPEDIC SURGERY  2002    torn ligament right shoulder     SOFT TISSUE SURGERY  12/6/10    excisional biopsy of tongue lesion     tonsillectomy  Age 6       FAMILY HISTORY:   Family History   Problem Relation Age of Onset     Cerebrovascular Disease Mother      Hypertension Mother      Respiratory Father         heavy smoker     Cancer Maternal Grandfather         lip cancer--heavy smoker     Unknown/Adopted Son      Unknown/Adopted Daughter        SOCIAL HISTORY:   Social History     Tobacco Use     Smoking status: Former Smoker     Packs/day: 0.25     Years: 2.00     Pack years: 0.50     Types: Cigarettes     Quit date: 1978     Years since quittin.8     Smokeless tobacco: Never Used     Tobacco comment: smoked socially in [employer]   Substance Use Topics     Alcohol use: Yes     Alcohol/week: 0.0 standard drinks     Comment: 2 drinks per day          Allergies   Allergen Reactions     Gadolinium Nausea and Vomiting     Unknown per patient if it is an allergy         Current Outpatient Medications:      amLODIPine (NORVASC) 10 MG tablet, Take 1 tablet (10 mg) by mouth daily, Disp: 90 tablet, Rfl: 3     atorvastatin (LIPITOR) 10 MG tablet, Take 1 tablet (10 mg) by mouth daily, Disp: 90 tablet, Rfl: 3     hydrochlorothiazide (HYDRODIURIL) 25 MG tablet, TAKE ONE TABLET DAILY AS NEEDED, Disp: 90 tablet, Rfl: 3     irbesartan (AVAPRO) 300 MG tablet, Take 1 tablet (300 mg) by mouth daily, Disp: 90 tablet, Rfl: 3     sildenafil (VIAGRA) 25 MG tablet, Take 4 tablets (100 mg) by mouth daily as needed 30 min to 4 hrs before sex. Do not use with nitroglycerin, terazosin or  doxazosin., Disp: 16 tablet, Rfl: 11     tobramycin (TOBREX) 0.3 % ophthalmic solution, Place 1-2 drops into both eyes every 2 hours, Disp: 5 mL, Rfl: 0    Review Of Systems:  Skin: No rash, pruritis, or skin pigmentation  Eyes: No changes in vision  Ears/Nose/Throat: No changes in hearing, no nosebleeds  Respiratory: No shortness of breath, dyspnea on exertion, cough, or hemoptysis  Cardiovascular: No chest pain or palpitations  Gastrointestinal: No diarrhea or constipation. No abdominal pain. No hematochezia  Genitourinary: see HPI  Musculoskeletal: No pain or swelling of joints, normal range of motion  Neurologic: No weakness or tremors  Psychiatric: No recent changes in memory or mood  Hematologic/Lymphatic/Immunologic: No easy bruising or enlarged lymph nodes  Endocrine: No weight gain or loss      PHYSICAL EXAM:    There were no vitals taken for this visit.  General appearance: In NAD, conversant  HEENT: Normocephalic and atraumatic, anicteric sclera  Cardiovascular: Not examined  Respiratory: normal, non-labored breathing  Gastrointestinal: negative, Abdomen soft, non-tender, and non-distended.   Musculoskeletal: Not Examined  Peripheral Vascular/extremity: No peripheral edema  Skin: Normal temperature, turgor, and texture. No rash  Psychiatric: Appropriate affect, alert and oriented to person, place, and time    Penis: Normal with mild meatal stenosis  Scrotal skin: Normal, no lesions  Testicles: Normal to palpation bilaterally  Epididymis: Normal to palpation bilaterally  Lymphatic: Normal inguinal lymph nodes    Digital Rectal Exam: The prostate is somewhat enlarged, benign and symmetric to palpation    Cystoscopy: Not done      PSA: 9.1    UA RESULTS:  Recent Labs   Lab Test 10/21/20  0956 09/25/20  1538   COLOR Yellow Yellow   APPEARANCE Clear Clear   URINEGLC Negative Negative   URINEBILI Negative Small*   URINEKETONE Negative Trace*   SG 1.025 1.020   UBLD Negative Moderate*   URINEPH 5.5 6.0   PROTEIN  Negative 100*   UROBILINOGEN 0.2 2.0*   NITRITE Negative Negative   LEUKEST Negative Small*   RBCU  --  5-10*   WBCU  --  25-50*       Bladder Scan: 78mL    Other Labs:      Imaging Studies: None      CLINICAL IMPRESSION:   Urinary tract infection, elevated PSA    PLAN:   His elevated PSA was likely caused by urinary tract infection.  The PSA was checked about 4 weeks after his UTI.  It has come down somewhat.  I recommended that we continue to follow this closely to make certain the PSA resolves.  We will check the PSA again in 2 months.  He does not wish to do a virtual visit for follow-up so he will come in for the results.  The PSA continues to trend downwards we will continue to watch, if the PSA yamileth is an elevated level we will need to pursue a potential prostate cancer evaluation.      Yosi Campbell MD

## 2021-01-08 DIAGNOSIS — R97.20 ELEVATED PROSTATE SPECIFIC ANTIGEN (PSA): Primary | ICD-10-CM

## 2021-01-11 DIAGNOSIS — R97.20 ELEVATED PROSTATE SPECIFIC ANTIGEN (PSA): ICD-10-CM

## 2021-01-11 LAB — PSA SERPL-MCNC: 7.4 NG/ML (ref 0–4)

## 2021-01-11 PROCEDURE — 84153 ASSAY OF PSA TOTAL: CPT | Performed by: UROLOGY

## 2021-01-13 ENCOUNTER — OFFICE VISIT (OUTPATIENT)
Dept: UROLOGY | Facility: CLINIC | Age: 71
End: 2021-01-13
Payer: COMMERCIAL

## 2021-01-13 VITALS
HEIGHT: 72 IN | BODY MASS INDEX: 30.34 KG/M2 | DIASTOLIC BLOOD PRESSURE: 82 MMHG | SYSTOLIC BLOOD PRESSURE: 142 MMHG | WEIGHT: 224 LBS

## 2021-01-13 DIAGNOSIS — R39.16 BENIGN PROSTATIC HYPERPLASIA (BPH) WITH STRAINING ON URINATION: ICD-10-CM

## 2021-01-13 DIAGNOSIS — N40.1 BENIGN PROSTATIC HYPERPLASIA (BPH) WITH STRAINING ON URINATION: ICD-10-CM

## 2021-01-13 DIAGNOSIS — Z87.440 PERSONAL HISTORY OF URINARY TRACT INFECTION: Primary | ICD-10-CM

## 2021-01-13 DIAGNOSIS — N52.9 ERECTILE DYSFUNCTION, UNSPECIFIED ERECTILE DYSFUNCTION TYPE: ICD-10-CM

## 2021-01-13 DIAGNOSIS — R97.20 ELEVATED PROSTATE SPECIFIC ANTIGEN (PSA): ICD-10-CM

## 2021-01-13 LAB
ALBUMIN UR-MCNC: NEGATIVE MG/DL
APPEARANCE UR: CLEAR
BILIRUB UR QL STRIP: NEGATIVE
COLOR UR AUTO: YELLOW
GLUCOSE UR STRIP-MCNC: NEGATIVE MG/DL
HGB UR QL STRIP: NEGATIVE
KETONES UR STRIP-MCNC: NEGATIVE MG/DL
LEUKOCYTE ESTERASE UR QL STRIP: NEGATIVE
NITRATE UR QL: NEGATIVE
PH UR STRIP: 7 PH (ref 5–7)
RESIDUAL VOLUME (RV) (EXTERNAL): 28
SOURCE: NORMAL
SP GR UR STRIP: 1.01 (ref 1–1.03)
UROBILINOGEN UR STRIP-ACNC: 0.2 EU/DL (ref 0.2–1)

## 2021-01-13 PROCEDURE — 51798 US URINE CAPACITY MEASURE: CPT | Performed by: UROLOGY

## 2021-01-13 PROCEDURE — 81003 URINALYSIS AUTO W/O SCOPE: CPT | Mod: QW | Performed by: UROLOGY

## 2021-01-13 PROCEDURE — 99214 OFFICE O/P EST MOD 30 MIN: CPT | Mod: 25 | Performed by: UROLOGY

## 2021-01-13 RX ORDER — TADALAFIL 20 MG/1
20 TABLET ORAL DAILY PRN
Qty: 10 TABLET | Refills: 11 | Status: SHIPPED | OUTPATIENT
Start: 2021-01-13 | End: 2022-01-25

## 2021-01-13 ASSESSMENT — PAIN SCALES - GENERAL: PAINLEVEL: NO PAIN (0)

## 2021-01-13 ASSESSMENT — MIFFLIN-ST. JEOR: SCORE: 1819.06

## 2021-01-13 NOTE — PROGRESS NOTES
Office Visit Note  [facility]  [phone]    UROLOGIC DIAGNOSES:   Elevated PSA, history of urinary tract infection    CURRENT INTERVENTIONS:       HISTORY:   Chrystal returns to clinic today for follow-up on his elevated PSA.  His PSA has improved but remains elevated at 7.4.  He does report some stopping and starting of his urinary stream but overall has no major urinary complaints.  His urinalysis is clear today.  He has questions about erectile dysfunction today as well.      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       PAST SURGICAL HISTORY:   Past Surgical History:   Procedure Laterality Date     BIOPSY       COLONOSCOPY  3/2009     COLONOSCOPY N/A 3/8/2017    Procedure: COMBINED COLONOSCOPY, SINGLE OR MULTIPLE BIOPSY/POLYPECTOMY BY BIOPSY;  Surgeon: Yung Maguire MD;  Location:  GI     ENT SURGERY       EXCISE LESION TRUNK N/A 2/3/2017    Procedure: EXCISE LESION TRUNK;  Surgeon: Jaswinder Lopez MD;  Location: [facility]     EXCISE LESION UPPER EXTREMITY Left 2/3/2017    Procedure: EXCISE LESION UPPER EXTREMITY;  Surgeon: Jaswinder Lopez MD;  Location: [facility]     HEAD & NECK SURGERY       HERNIA REPAIR Left 2002     ORTHOPEDIC SURGERY  2002    torn ligament right shoulder     SOFT TISSUE SURGERY  12/6/10    excisional biopsy of tongue lesion     tonsillectomy  Age 6       FAMILY HISTORY:   Family History   Problem Relation  Age of Onset     Cerebrovascular Disease Mother      Hypertension Mother      Respiratory Father         heavy smoker     Cancer Maternal Grandfather         lip cancer--heavy smoker     Unknown/Adopted Son      Unknown/Adopted Daughter        SOCIAL HISTORY:   Social History     Socioeconomic History     Marital status:      Spouse name: None     Number of children: None     Years of education: None     Highest education level: None   Occupational History     None   Social Needs     Financial resource strain: None     Food insecurity     Worry: None     Inability: None     Transportation needs     Medical: None     Non-medical: None   Tobacco Use     Smoking status: Former Smoker     Packs/day: 0.25     Years: 2.00     Pack years: 0.50     Types: Cigarettes     Quit date: 1978     Years since quittin.0     Smokeless tobacco: Never Used     Tobacco comment: smoked socially in college   Substance and Sexual Activity     Alcohol use: Yes     Alcohol/week: 0.0 standard drinks     Comment: 2 drinks per day     Drug use: No     Sexual activity: Yes     Partners: Female   Lifestyle     Physical activity     Days per week: None     Minutes per session: None     Stress: None   Relationships     Social connections     Talks on phone: None     Gets together: None     Attends Buddhism service: None     Active member of club or organization: None     Attends meetings of clubs or organizations: None     Relationship status: None     Intimate partner violence     Fear of current or ex partner: None     Emotionally abused: None     Physically abused: None     Forced sexual activity: None   Other Topics Concern     Parent/sibling w/ CABG, MI or angioplasty before 65F 55M? No   Social History Narrative     None       Review Of Systems:  Skin: No rash, pruritis, or skin pigmentation  Eyes: No changes in vision  Ears/Nose/Throat: No changes in hearing, no nosebleeds  Respiratory: No shortness of breath, dyspnea on  exertion, cough, or hemoptysis  Cardiovascular: No chest pain or palpitations  Gastrointestinal: No diarrhea or constipation. No abdominal pain. No hematochezia  Genitourinary: see HPI  Musculoskeletal: No pain or swelling of joints, normal range of motion  Neurologic: No weakness or tremors  Psychiatric: No recent changes in memory or mood  Hematologic/Lymphatic/Immunologic: No easy bruising or enlarged lymph nodes  Endocrine: No weight gain or loss      PHYSICAL EXAM:    BP (!) 142/82 (BP Location: Right arm)   Ht 1.829 m (6')   Wt 101.6 kg (224 lb)   BMI 30.38 kg/m      Constitutional: Well developed. Conversant and in no acute distress  Eyes: Anicteric sclera, conjunctiva clear, normal extraocular movements  ENT: Normocephalic and atraumatic,   Skin: Warm and dry. No rashes or lesions  Cardiac: No peripheral edema  Back/Flank: Not done  CNS/PNS: Normal musculature and movements, moves all extremities normally  Respiratory: Normal non-labored breathing  Abdomen: Soft nontender and nondistended  Peripheral Vascular: No peripheral edema  Mental Status/Psych: Alert and Oriented x 3. Normal mood and affect    Penis: Normal  Scrotal skin: Normal, no lesions  Testicles: Normal to palpation bilaterally  Epididymis: Normal to palpation bilaterally  Lymphatic: Normal inguinal lymph nodes  Digital Rectal Exam: The prostate is somewhat enlarged, benign and symmetric to palpation    Cystoscopy: Not done    Imaging: None    Urinalysis: UA RESULTS:  Recent Labs   Lab Test 11/13/20  1123 09/25/20  1538 09/25/20  1538   COLOR Yellow   < > Yellow   APPEARANCE Clear   < > Clear   URINEGLC Negative   < > Negative   URINEBILI Negative   < > Small*   URINEKETONE Negative   < > Trace*   SG 1.015   < > 1.020   UBLD Negative   < > Moderate*   URINEPH 7.5*   < > 6.0   PROTEIN Negative   < > 100*   UROBILINOGEN 0.2   < > 2.0*   NITRITE Negative   < > Negative   LEUKEST Negative   < > Small*   RBCU  --   --  5-10*   WBCU  --   --   25-50*    < > = values in this interval not displayed.       PSA: 7.4    Post Void Residual: 28mL    Other labs: None today      IMPRESSION:  Elevated PSA, history of urinary tract infection, erectile dysfunction    PLAN:  I answered his questions regarding his elevated PSA and erectile dysfunction.  We discussed treatment options for erectile dysfunction and he wishes to have a prescription for generic Cialis.  I gave him the prescription along with instructions.  With his PSA continues to improve but still is elevated above where we would like to see it.  I recommended that we continue to follow this closely.  We will check another PSA in 6 weeks.  He does not want to do a virtual visit so he will come back and see me for the results.      Yosi Campbell M.D.

## 2021-01-13 NOTE — LETTER
1/13/2021       RE: Chrystal Solis  [address] [place] [address]     Dear Colleague,    Thank you for referring your patient, Chrystal Solis, to the [facility] [place] at [facility]. Please see a copy of my visit note below.    Office Visit Note  [facility]  [phone]    UROLOGIC DIAGNOSES:   Elevated PSA, history of urinary tract infection    CURRENT INTERVENTIONS:       HISTORY:   Chrystal returns to clinic today for follow-up on his elevated PSA.  His PSA has improved but remains elevated at 7.4.  He does report some stopping and starting of his urinary stream but overall has no major urinary complaints.  His urinalysis is clear today.  He has questions about erectile dysfunction today as well.      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       PAST SURGICAL HISTORY:   Past Surgical History:   Procedure Laterality Date     BIOPSY       COLONOSCOPY  3/2009     COLONOSCOPY N/A 3/8/2017    Procedure: COMBINED COLONOSCOPY, SINGLE OR MULTIPLE BIOPSY/POLYPECTOMY BY BIOPSY;  Surgeon: Yung Maguire MD;  Location:  GI     ENT SURGERY       EXCISE LESION TRUNK N/A 2/3/2017    Procedure: EXCISE LESION TRUNK;  Surgeon: Jaswinder Lopez MD;  Location: [facility]     EXCISE LESION UPPER EXTREMITY Left 2/3/2017    Procedure: EXCISE LESION UPPER EXTREMITY;  Surgeon: John  Jaswinder Mckeon MD;  Location: [facility]     HEAD & NECK SURGERY       HERNIA REPAIR Left 2002     ORTHOPEDIC SURGERY  2002    torn ligament right shoulder     SOFT TISSUE SURGERY  12/6/10    excisional biopsy of tongue lesion     tonsillectomy  Age 6       FAMILY HISTORY:   Family History   Problem Relation Age of Onset     Cerebrovascular Disease Mother      Hypertension Mother      Respiratory Father         heavy smoker     Cancer Maternal Grandfather         lip cancer--heavy smoker     Unknown/Adopted Son      Unknown/Adopted Daughter        SOCIAL HISTORY:   Social History     Socioeconomic History     Marital status:      Spouse name: None     Number of children: None     Years of education: None     Highest education level: None   Occupational History     None   Social Needs     Financial resource strain: None     Food insecurity     Worry: None     Inability: None     Transportation needs     Medical: None     Non-medical: None   Tobacco Use     Smoking status: Former Smoker     Packs/day: 0.25     Years: 2.00     Pack years: 0.50     Types: Cigarettes     Quit date: 1978     Years since quittin.0     Smokeless tobacco: Never Used     Tobacco comment: smoked socially in college   Substance and Sexual Activity     Alcohol use: Yes     Alcohol/week: 0.0 standard drinks     Comment: 2 drinks per day     Drug use: No     Sexual activity: Yes     Partners: Female   Lifestyle     Physical activity     Days per week: None     Minutes per session: None     Stress: None   Relationships     Social connections     Talks on phone: None     Gets together: None     Attends Sabianism service: None     Active member of club or organization: None     Attends meetings of clubs or organizations: None     Relationship status: None     Intimate partner violence     Fear of current or ex partner: None     Emotionally abused: None     Physically abused: None     Forced sexual activity: None   Other Topics Concern      Parent/sibling w/ CABG, MI or angioplasty before 65F 55M? No   Social History Narrative     None       Review Of Systems:  Skin: No rash, pruritis, or skin pigmentation  Eyes: No changes in vision  Ears/Nose/Throat: No changes in hearing, no nosebleeds  Respiratory: No shortness of breath, dyspnea on exertion, cough, or hemoptysis  Cardiovascular: No chest pain or palpitations  Gastrointestinal: No diarrhea or constipation. No abdominal pain. No hematochezia  Genitourinary: see HPI  Musculoskeletal: No pain or swelling of joints, normal range of motion  Neurologic: No weakness or tremors  Psychiatric: No recent changes in memory or mood  Hematologic/Lymphatic/Immunologic: No easy bruising or enlarged lymph nodes  Endocrine: No weight gain or loss      PHYSICAL EXAM:    BP (!) 142/82 (BP Location: Right arm)   Ht 1.829 m (6')   Wt 101.6 kg (224 lb)   BMI 30.38 kg/m      Constitutional: Well developed. Conversant and in no acute distress  Eyes: Anicteric sclera, conjunctiva clear, normal extraocular movements  ENT: Normocephalic and atraumatic,   Skin: Warm and dry. No rashes or lesions  Cardiac: No peripheral edema  Back/Flank: Not done  CNS/PNS: Normal musculature and movements, moves all extremities normally  Respiratory: Normal non-labored breathing  Abdomen: Soft nontender and nondistended  Peripheral Vascular: No peripheral edema  Mental Status/Psych: Alert and Oriented x 3. Normal mood and affect    Penis: Normal  Scrotal skin: Normal, no lesions  Testicles: Normal to palpation bilaterally  Epididymis: Normal to palpation bilaterally  Lymphatic: Normal inguinal lymph nodes  Digital Rectal Exam: The prostate is somewhat enlarged, benign and symmetric to palpation    Cystoscopy: Not done    Imaging: None    Urinalysis: UA RESULTS:  Recent Labs   Lab Test 11/13/20  1123 09/25/20  1538 09/25/20  1538   COLOR Yellow   < > Yellow   APPEARANCE Clear   < > Clear   URINEGLC Negative   < > Negative   URINEBILI  Negative   < > Small*   URINEKETONE Negative   < > Trace*   SG 1.015   < > 1.020   UBLD Negative   < > Moderate*   URINEPH 7.5*   < > 6.0   PROTEIN Negative   < > 100*   UROBILINOGEN 0.2   < > 2.0*   NITRITE Negative   < > Negative   LEUKEST Negative   < > Small*   RBCU  --   --  5-10*   WBCU  --   --  25-50*    < > = values in this interval not displayed.       PSA: 7.4    Post Void Residual: 28mL    Other labs: None today      IMPRESSION:  Elevated PSA, history of urinary tract infection, erectile dysfunction    PLAN:  I answered his questions regarding his elevated PSA and erectile dysfunction.  We discussed treatment options for erectile dysfunction and he wishes to have a prescription for generic Cialis.  I gave him the prescription along with instructions.  With his PSA continues to improve but still is elevated above where we would like to see it.  I recommended that we continue to follow this closely.  We will check another PSA in 6 weeks.  He does not want to do a virtual visit so he will come back and see me for the results.      Yosi Campbell M.D.

## 2021-02-22 ENCOUNTER — TELEPHONE (OUTPATIENT)
Dept: UROLOGY | Facility: CLINIC | Age: 71
End: 2021-02-22

## 2021-02-22 ENCOUNTER — ALLIED HEALTH/NURSE VISIT (OUTPATIENT)
Dept: UROLOGY | Facility: CLINIC | Age: 71
End: 2021-02-22
Payer: COMMERCIAL

## 2021-02-22 DIAGNOSIS — R33.9 URINARY RETENTION: Primary | ICD-10-CM

## 2021-02-22 LAB
ALBUMIN UR-MCNC: NEGATIVE MG/DL
APPEARANCE UR: CLEAR
BILIRUB UR QL STRIP: NEGATIVE
COLOR UR AUTO: YELLOW
GLUCOSE UR STRIP-MCNC: NEGATIVE MG/DL
HGB UR QL STRIP: NEGATIVE
KETONES UR STRIP-MCNC: NEGATIVE MG/DL
LEUKOCYTE ESTERASE UR QL STRIP: NEGATIVE
NITRATE UR QL: NEGATIVE
PH UR STRIP: 7.5 PH (ref 5–7)
RESIDUAL VOLUME (RV) (EXTERNAL): 534
SOURCE: ABNORMAL
SP GR UR STRIP: 1.02 (ref 1–1.03)
UROBILINOGEN UR STRIP-ACNC: 0.2 EU/DL (ref 0.2–1)

## 2021-02-22 PROCEDURE — 51798 US URINE CAPACITY MEASURE: CPT

## 2021-02-22 PROCEDURE — 81003 URINALYSIS AUTO W/O SCOPE: CPT | Performed by: UROLOGY

## 2021-02-22 PROCEDURE — 87086 URINE CULTURE/COLONY COUNT: CPT | Performed by: UROLOGY

## 2021-02-22 PROCEDURE — 51702 INSERT TEMP BLADDER CATH: CPT

## 2021-02-22 RX ORDER — LIDOCAINE HYDROCHLORIDE 20 MG/ML
JELLY TOPICAL ONCE
Status: COMPLETED | OUTPATIENT
Start: 2021-02-22 | End: 2021-02-22

## 2021-02-22 RX ORDER — TAMSULOSIN HYDROCHLORIDE 0.4 MG/1
0.4 CAPSULE ORAL DAILY
Qty: 90 CAPSULE | Refills: 3 | Status: SHIPPED | OUTPATIENT
Start: 2021-02-22 | End: 2022-01-25

## 2021-02-22 RX ADMIN — LIDOCAINE HYDROCHLORIDE 20 ML: 20 JELLY TOPICAL at 13:00

## 2021-02-22 NOTE — PROGRESS NOTES
Chief Complaint   Patient presents with     Urinary Retention     Patient here today for UA/UC and PVR       There were no vitals taken for this visit. There is no height or weight on file to calculate BMI.    Patient Active Problem List   Diagnosis     Squamous cell carcinoma of tongue (H)     Benign essential hypertension     Hyperlipidemia LDL goal <100     Peripheral polyneuropathy     Advanced directives, counseling/discussion       Allergies   Allergen Reactions     Gadolinium Nausea and Vomiting     Unknown per patient if it is an allergy       Current Outpatient Medications   Medication Sig Dispense Refill     amLODIPine (NORVASC) 10 MG tablet Take 1 tablet (10 mg) by mouth daily 90 tablet 3     atorvastatin (LIPITOR) 10 MG tablet Take 1 tablet (10 mg) by mouth daily 90 tablet 3     hydrochlorothiazide (HYDRODIURIL) 25 MG tablet TAKE ONE TABLET DAILY AS NEEDED 90 tablet 3     irbesartan (AVAPRO) 300 MG tablet Take 1 tablet (300 mg) by mouth daily 90 tablet 3     sildenafil (VIAGRA) 25 MG tablet Take 4 tablets (100 mg) by mouth daily as needed 30 min to 4 hrs before sex. Do not use with nitroglycerin, terazosin or doxazosin. 16 tablet 11     tadalafil (CIALIS) 20 MG tablet Take 1 tablet (20 mg) by mouth daily as needed (erectile dysfunction) 10 tablet 11     tamsulosin (FLOMAX) 0.4 MG capsule Take 1 capsule (0.4 mg) by mouth daily 90 capsule 3     tobramycin (TOBREX) 0.3 % ophthalmic solution Place 1-2 drops into both eyes every 2 hours 5 mL 0       Social History     Tobacco Use     Smoking status: Former Smoker     Packs/day: 0.25     Years: 2.00     Pack years: 0.50     Types: Cigarettes     Quit date: 1978     Years since quittin.1     Smokeless tobacco: Never Used     Tobacco comment: smoked socially in [place]   Substance Use Topics     Alcohol use: Yes     Alcohol/week: 0.0 standard drinks     Comment: 2 drinks per day     Drug use: No         PVR 534ML    UA RESULTS:  Recent Labs   Lab Test  02/22/21  1306 09/25/20  1538 09/25/20  1538   COLOR Yellow   < > Yellow   APPEARANCE Clear   < > Clear   URINEGLC Negative   < > Negative   URINEBILI Negative   < > Small*   URINEKETONE Negative   < > Trace*   SG 1.020   < > 1.020   UBLD Negative   < > Moderate*   URINEPH 7.5*   < > 6.0   PROTEIN Negative   < > 100*   UROBILINOGEN 0.2   < > 2.0*   NITRITE Negative   < > Negative   LEUKEST Negative   < > Small*   RBCU  --   --  5-10*   WBCU  --   --  25-50*    < > = values in this interval not displayed.     UC SENT OUT TODAY.      Catheter insertion documentation on 2/22/2021:    Chrystal Solis presents to the clinic for catheter insertion.  Reason for insertion: urinary retention  Order has been verified. YES  Catheter successfully inserted into the urethral meatus in the usual sterile fashion without immediate complication.  Type of catheter placed: 16 [place] Coude catheter  Urine is yellow in color.  450 cc's of urine output returned.Patient voided 180 on his own  Balloon was filled with 8cc's of normal saline.  Securement device placed for the catheter.  The patient tolerated the procedure and was instructed to follow up with Dr Campbell  as planned, monitor for catheter dysfunction, monitor for pain or discomfort, return or call for pain, fever, leakage or decreased urine flow, watch for signs of infection and call with any question.        5mL 2% lidocaine hydrochloride Urojet instilled into urethra.    NDC# [address]-9  Lot #: [id number]  Expiration Date:  09/22            Briana TranTIKI  2/22/2021  4:16 PM

## 2021-02-22 NOTE — TELEPHONE ENCOUNTER
[facility]    Phone Message    May a detailed message be left on voicemail: yes     Reason for Call: Symptoms or Concerns     If patient has red-flag symptoms, warm transfer to triage line    Current symptom or concern: pt stated he is in a lot of pain and having trouble voiding his urine, please call pt thank you    Symptoms have been present for:  3 day(s)    Has patient previously been seen for this? No    By : n/a    Date: n/a    Are there any new or worsening symptoms? Yes: worsening      Action Taken: Message routed to:  [facility] uro    Travel Screening: Not Applicable

## 2021-02-23 LAB
BACTERIA SPEC CULT: NO GROWTH
Lab: NORMAL
SPECIMEN SOURCE: NORMAL

## 2021-02-24 NOTE — TELEPHONE ENCOUNTER
Spoke with patient on 02/22 and he was made a nurse appointment to check for UTI as patient thought he may have an infection. He also was set-up to get a PVR as urine volumes were low.      Nora Jain LPN

## 2021-02-26 ENCOUNTER — TELEPHONE (OUTPATIENT)
Dept: UROLOGY | Facility: CLINIC | Age: 71
End: 2021-02-26

## 2021-02-26 NOTE — TELEPHONE ENCOUNTER
[facility]    Phone Message    May a detailed message be left on voicemail: yes     Reason for Call: Other: Pt calling to report that his wiseman catheter had been fine until this morning when he noticed the urine in his bag was a dark brown. Would like a call back to discuss what he should do.     Action Taken: Message routed to:  [facility] uro    Travel Screening: Not Applicable

## 2021-02-26 NOTE — TELEPHONE ENCOUNTER
Returned phone call and spoke with patient. He instructed me that he started to see the blood after he went for a walk. Instructed that it's possible the catheter may have been pulling some. It may be the source of blood. Patient was encouraged to drink plenty of water, and states that he already drinks quite a lot. Patient will increase water intake. Patient also reports that he has some burning around catheter. Patient was offered an appointment today via [facility] [place], but there are no openings this afternoon. Instructed patient to monitor symptoms over weekend. He was instructed to go to ER if fever develops or if he develops darker red urine/bloot clots/catheter is not draining. Patient will call back Monday as instructed if symptoms are not resolved.       Nora Jain LPN

## 2021-03-01 ENCOUNTER — TELEPHONE (OUTPATIENT)
Dept: UROLOGY | Facility: CLINIC | Age: 71
End: 2021-03-01

## 2021-03-01 NOTE — TELEPHONE ENCOUNTER
[facility]    Phone Message    May a detailed message be left on voicemail: yes     Reason for Call: Order(s): Other:   Reason for requested: UA orders  Date needed: 03/01/21  Provider name: Dr. Campbell    Per Pt Chrystal requests Nora file orders for UA, requests antibiotics for UTI, pls call to confirm.      Action Taken: Other: UA URO    Travel Screening: Not Applicable

## 2021-03-01 NOTE — TELEPHONE ENCOUNTER
Returned phone call and offered a nurse visit for today, patient couldn't make it to either office. Informed him we could work him in at 9:30am tomorrow in [place]. Patient took this appointment.     Nora Jain LPN

## 2021-03-02 ENCOUNTER — ALLIED HEALTH/NURSE VISIT (OUTPATIENT)
Dept: UROLOGY | Facility: CLINIC | Age: 71
End: 2021-03-02
Payer: COMMERCIAL

## 2021-03-02 VITALS — DIASTOLIC BLOOD PRESSURE: 64 MMHG | SYSTOLIC BLOOD PRESSURE: 128 MMHG

## 2021-03-02 DIAGNOSIS — R30.0 DYSURIA: Primary | ICD-10-CM

## 2021-03-02 LAB
ALBUMIN UR-MCNC: NEGATIVE MG/DL
APPEARANCE UR: CLEAR
BILIRUB UR QL STRIP: NEGATIVE
COLOR UR AUTO: YELLOW
GLUCOSE UR STRIP-MCNC: NEGATIVE MG/DL
HGB UR QL STRIP: ABNORMAL
KETONES UR STRIP-MCNC: NEGATIVE MG/DL
LEUKOCYTE ESTERASE UR QL STRIP: NEGATIVE
NITRATE UR QL: NEGATIVE
PH UR STRIP: 6.5 PH (ref 5–7)
SOURCE: ABNORMAL
SP GR UR STRIP: 1.01 (ref 1–1.03)
UROBILINOGEN UR STRIP-ACNC: 0.2 EU/DL (ref 0.2–1)

## 2021-03-02 PROCEDURE — 99211 OFF/OP EST MAY X REQ PHY/QHP: CPT

## 2021-03-02 PROCEDURE — 87086 URINE CULTURE/COLONY COUNT: CPT | Performed by: UROLOGY

## 2021-03-02 PROCEDURE — 81003 URINALYSIS AUTO W/O SCOPE: CPT | Mod: QW | Performed by: UROLOGY

## 2021-03-02 NOTE — PROGRESS NOTES
Chrystal Solis comes into clinic today at the request of Dr. Campbell Ordering Provider for UA/UC cath specimen.    Patient presents to clinic for catheterized urine sample. He reports having blood in urine. Urine today looks clear. Patient also reports burning sensation at tip of penis. Patient thinks he may have a UTI. Patient's urine was sent for UAUC after clamping catheter for 15 minutes and using an alcohol prep pad to clean off distal end of catheter. Patient is aware we will call with positive results only. Patient instructed to monitor for fever and go to ER if this develops.    This service provided today was under the supervising provider of the day Dr. Stovall, who was available if needed.    Nora Jain LPN

## 2021-03-03 LAB
BACTERIA SPEC CULT: NO GROWTH
Lab: NORMAL
SPECIMEN SOURCE: NORMAL

## 2021-03-09 ENCOUNTER — OFFICE VISIT (OUTPATIENT)
Dept: UROLOGY | Facility: CLINIC | Age: 71
End: 2021-03-09
Payer: COMMERCIAL

## 2021-03-09 ENCOUNTER — TELEPHONE (OUTPATIENT)
Dept: FAMILY MEDICINE | Facility: CLINIC | Age: 71
End: 2021-03-09

## 2021-03-09 VITALS
HEART RATE: 63 BPM | HEIGHT: 72 IN | WEIGHT: 220 LBS | OXYGEN SATURATION: 96 % | BODY MASS INDEX: 29.8 KG/M2 | SYSTOLIC BLOOD PRESSURE: 130 MMHG | DIASTOLIC BLOOD PRESSURE: 80 MMHG

## 2021-03-09 DIAGNOSIS — R97.20 ELEVATED PROSTATE SPECIFIC ANTIGEN (PSA): ICD-10-CM

## 2021-03-09 DIAGNOSIS — Z11.59 ENCOUNTER FOR SCREENING FOR OTHER VIRAL DISEASES: ICD-10-CM

## 2021-03-09 DIAGNOSIS — D49.4 BLADDER TUMOR: Primary | ICD-10-CM

## 2021-03-09 LAB — PSA SERPL-MCNC: 6 NG/ML (ref 0–4)

## 2021-03-09 PROCEDURE — 99214 OFFICE O/P EST MOD 30 MIN: CPT | Mod: 25 | Performed by: UROLOGY

## 2021-03-09 PROCEDURE — 84153 ASSAY OF PSA TOTAL: CPT | Performed by: UROLOGY

## 2021-03-09 PROCEDURE — 52000 CYSTOURETHROSCOPY: CPT | Performed by: UROLOGY

## 2021-03-09 RX ORDER — LIDOCAINE HYDROCHLORIDE 20 MG/ML
JELLY TOPICAL ONCE
Status: COMPLETED | OUTPATIENT
Start: 2021-03-09 | End: 2021-03-09

## 2021-03-09 RX ORDER — CIPROFLOXACIN 500 MG/1
500 TABLET, FILM COATED ORAL ONCE
Qty: 1 TABLET | Refills: 0 | Status: SHIPPED | OUTPATIENT
Start: 2021-03-09 | End: 2021-03-09

## 2021-03-09 RX ADMIN — LIDOCAINE HYDROCHLORIDE: 20 JELLY TOPICAL at 10:00

## 2021-03-09 ASSESSMENT — PAIN SCALES - GENERAL: PAINLEVEL: NO PAIN (0)

## 2021-03-09 ASSESSMENT — MIFFLIN-ST. JEOR: SCORE: 1800.91

## 2021-03-09 NOTE — PROGRESS NOTES
Office Visit Note  [facility]  [phone]    UROLOGIC DIAGNOSES:   Elevated PSA, history of UTI, ED, urinary retention    CURRENT INTERVENTIONS:   Cialis    HISTORY:   Chrystal came to the clinic with urinary retention on 2/22 and had a wiseman catheter placed for 450mL of output. He is taking flomax now. No UTI at time of retention. PSA today is down to 6.0. I recommended a cystoscopy today to evaluate his bladder outlet obstruction      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       PAST SURGICAL HISTORY:   Past Surgical History:   Procedure Laterality Date     BIOPSY       COLONOSCOPY  3/2009     COLONOSCOPY N/A 3/8/2017    Procedure: COMBINED COLONOSCOPY, SINGLE OR MULTIPLE BIOPSY/POLYPECTOMY BY BIOPSY;  Surgeon: Yung Maguire MD;  Location:  GI     ENT SURGERY       EXCISE LESION TRUNK N/A 2/3/2017    Procedure: EXCISE LESION TRUNK;  Surgeon: Jaswinder Lopez MD;  Location: [facility]     EXCISE LESION UPPER EXTREMITY Left 2/3/2017    Procedure: EXCISE LESION UPPER EXTREMITY;  Surgeon: Jaswinder Lopez MD;  Location: [facility]     HEAD & NECK SURGERY       HERNIA REPAIR Left 2002     ORTHOPEDIC SURGERY  2002    torn ligament right shoulder     SOFT TISSUE SURGERY  12/6/10    excisional biopsy of tongue lesion     tonsillectomy  Age 6       FAMILY HISTORY:   Family History   Problem Relation Age of Onset     Cerebrovascular Disease  Mother      Hypertension Mother      Respiratory Father         heavy smoker     Cancer Maternal Grandfather         lip cancer--heavy smoker     Unknown/Adopted Son      Unknown/Adopted Daughter        SOCIAL HISTORY:   Social History     Socioeconomic History     Marital status:      Spouse name: Not on file     Number of children: Not on file     Years of education: Not on file     Highest education level: Not on file   Occupational History     Not on file   Social Needs     Financial resource strain: Not on file     Food insecurity     Worry: Not on file     Inability: Not on file     Transportation needs     Medical: Not on file     Non-medical: Not on file   Tobacco Use     Smoking status: Former Smoker     Packs/day: 0.25     Years: 2.00     Pack years: 0.50     Types: Cigarettes     Quit date: 1978     Years since quittin.2     Smokeless tobacco: Never Used     Tobacco comment: smoked socially in college   Substance and Sexual Activity     Alcohol use: Yes     Alcohol/week: 0.0 standard drinks     Comment: 2 drinks per day     Drug use: No     Sexual activity: Yes     Partners: Female   Lifestyle     Physical activity     Days per week: Not on file     Minutes per session: Not on file     Stress: Not on file   Relationships     Social connections     Talks on phone: Not on file     Gets together: Not on file     Attends Yarsanism service: Not on file     Active member of club or organization: Not on file     Attends meetings of clubs or organizations: Not on file     Relationship status: Not on file     Intimate partner violence     Fear of current or ex partner: Not on file     Emotionally abused: Not on file     Physically abused: Not on file     Forced sexual activity: Not on file   Other Topics Concern     Parent/sibling w/ CABG, MI or angioplasty before 65F 55M? No   Social History Narrative     Not on file       Review Of Systems:  Skin: No rash, pruritis, or skin pigmentation  Eyes: No  changes in vision  Ears/Nose/Throat: No changes in hearing, no nosebleeds  Respiratory: No shortness of breath, dyspnea on exertion, cough, or hemoptysis  Cardiovascular: No chest pain or palpitations  Gastrointestinal: No diarrhea or constipation. No abdominal pain. No hematochezia  Genitourinary: see HPI  Musculoskeletal: No pain or swelling of joints, normal range of motion  Neurologic: No weakness or tremors  Psychiatric: No recent changes in memory or mood  Hematologic/Lymphatic/Immunologic: No easy bruising or enlarged lymph nodes  Endocrine: No weight gain or loss      PHYSICAL EXAM:    There were no vitals taken for this visit.    Constitutional: Well developed. Conversant and in no acute distress  Eyes: Anicteric sclera, conjunctiva clear, normal extraocular movements  ENT: Normocephalic and atraumatic,   Skin: Warm and dry. No rashes or lesions  Cardiac: No peripheral edema  Back/Flank: Not done  CNS/PNS: Normal musculature and movements, moves all extremities normally  Respiratory: Normal non-labored breathing  Abdomen: Soft nontender and nondistended  Peripheral Vascular: No peripheral edema  Mental Status/Psych: Alert and Oriented x 3. Normal mood and affect    Penis: Not done  Scrotal Skin: Not done  Testicles: Not done  Epididymis: Not done  Digital Rectal Exam:     Cystoscopy: I performed flexible cystoscopy and the patient and a papillary bladder tumor growing on a small stalk from the median lobe of the prostate. The rest of the bladder was normal throughout. On retroflexion the scope he had a circumferentially enlarged prostate with a circumferentially enlarged intravesical segment.    Imaging: None    Urinalysis: UA RESULTS:  Recent Labs   Lab Test 03/02/21  1007 09/25/20  1538 09/25/20  1538   COLOR Yellow   < > Yellow   APPEARANCE Clear   < > Clear   URINEGLC Negative   < > Negative   URINEBILI Negative   < > Small*   URINEKETONE Negative   < > Trace*   SG 1.015   < > 1.020   UBLD Moderate*   <  > Moderate*   URINEPH 6.5   < > 6.0   PROTEIN Negative   < > 100*   UROBILINOGEN 0.2   < > 2.0*   NITRITE Negative   < > Negative   LEUKEST Negative   < > Small*   RBCU  --   --  5-10*   WBCU  --   --  25-50*    < > = values in this interval not displayed.       PSA: 6    Post Void Residual:     Other labs: None today      IMPRESSION:  Enlarged prostate, bladder tumor, urinary retention    PLAN:  He had a bladder tumor discovered on cystoscopy today. This was a surprising finding and that he has never had hematuria on any prior urine samples. I recommended transurethral resection of bladder tumor in the operating room. We discussed the procedure.  The procedure to remove his bladder tumor will actually be a transurethral resection of the prostate.  I will need to resect a small area of the median lobe of the prostate to remove the tumor.  We discussed the risks of the procedure and he wishes to proceed.    His Frey catheter was replaced today.  He will leave the Frey catheter in place until surgery.  I counseled him that he will need a Frey catheter placed for this 1 night after the procedure.  He may be able to discharge home the same day after the procedure but he may need to stay 1 night for observation as well.  He also needs a CT urogram performed and this was ordered for him.    We will continue to follow the PSA closely, it is improving      Yosi Campbell M.D.

## 2021-03-09 NOTE — PATIENT INSTRUCTIONS

## 2021-03-09 NOTE — TELEPHONE ENCOUNTER
Yes.   He can be seen in an open 1200 or 12:30 slot or a same day or hosp follow up slot can be used or virtual slot can be changed to an in person visit if needed

## 2021-03-09 NOTE — TELEPHONE ENCOUNTER
Called patient and schedule him for 3/10/2021 at 2:30 for Pre-op.    Kenna Blair MA on 3/9/2021 at 5:54 PM

## 2021-03-09 NOTE — TELEPHONE ENCOUNTER
Reason for Call:  Same Day Appointment, Requested Provider:  Dr Rodríguez    PCP: Lam Rodríguez    Reason for visit: pre-op physical    Duration of symptoms: on going, recent cancer diagnosis-     Have you been treated for this in the past? unknown    Additional comments: because of cancer diagnosis he wants to do this with PCP    Can we leave a detailed message on this number? YES    Phone number patient can be reached at: Cell number on file:    Telephone Information:   Mobile [phone]       Best Time: any    Call taken on 3/9/2021 at 10:24 AM by Sarah Beth Menendez

## 2021-03-09 NOTE — LETTER
3/9/2021       RE: Chrystal Solis  [address] [place] [address]     Dear Colleague,    Thank you for referring your patient, Chrystal Solis, to the [facility] at [facility]. Please see a copy of my visit note below.    Office Visit Note  [facility]  [phone]    UROLOGIC DIAGNOSES:   Elevated PSA, history of UTI, ED, urinary retention    CURRENT INTERVENTIONS:   Cialis    HISTORY:   Chrystal came to the clinic with urinary retention on 2/22 and had a wiseman catheter placed for 450mL of output. He is taking flomax now. No UTI at time of retention. PSA today is down to 6.0. I recommended a cystoscopy today to evaluate his bladder outlet obstruction      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       PAST SURGICAL HISTORY:   Past Surgical History:   Procedure Laterality Date     BIOPSY       COLONOSCOPY  3/2009     COLONOSCOPY N/A 3/8/2017    Procedure: COMBINED COLONOSCOPY, SINGLE OR MULTIPLE BIOPSY/POLYPECTOMY BY BIOPSY;  Surgeon: Yung aMguire MD;  Location:  GI     ENT SURGERY       EXCISE LESION TRUNK N/A 2/3/2017    Procedure: EXCISE LESION TRUNK;  Surgeon: Jaswinder Lopez MD;  Location: [facility]     EXCISE LESION UPPER EXTREMITY Left 2/3/2017    Procedure: EXCISE LESION UPPER EXTREMITY;  Surgeon: Jaswinder Lopez MD;  Location: [facility]      HEAD & NECK SURGERY       HERNIA REPAIR Left 2002     ORTHOPEDIC SURGERY  2002    torn ligament right shoulder     SOFT TISSUE SURGERY  12/6/10    excisional biopsy of tongue lesion     tonsillectomy  Age 6       FAMILY HISTORY:   Family History   Problem Relation Age of Onset     Cerebrovascular Disease Mother      Hypertension Mother      Respiratory Father         heavy smoker     Cancer Maternal Grandfather         lip cancer--heavy smoker     Unknown/Adopted Son      Unknown/Adopted Daughter        SOCIAL HISTORY:   Social History     Socioeconomic History     Marital status:      Spouse name: Not on file     Number of children: Not on file     Years of education: Not on file     Highest education level: Not on file   Occupational History     Not on file   Social Needs     Financial resource strain: Not on file     Food insecurity     Worry: Not on file     Inability: Not on file     Transportation needs     Medical: Not on file     Non-medical: Not on file   Tobacco Use     Smoking status: Former Smoker     Packs/day: 0.25     Years: 2.00     Pack years: 0.50     Types: Cigarettes     Quit date: 1978     Years since quittin.2     Smokeless tobacco: Never Used     Tobacco comment: smoked socially in college   Substance and Sexual Activity     Alcohol use: Yes     Alcohol/week: 0.0 standard drinks     Comment: 2 drinks per day     Drug use: No     Sexual activity: Yes     Partners: Female   Lifestyle     Physical activity     Days per week: Not on file     Minutes per session: Not on file     Stress: Not on file   Relationships     Social connections     Talks on phone: Not on file     Gets together: Not on file     Attends Episcopal service: Not on file     Active member of club or organization: Not on file     Attends meetings of clubs or organizations: Not on file     Relationship status: Not on file     Intimate partner violence     Fear of current or ex partner: Not on file     Emotionally  abused: Not on file     Physically abused: Not on file     Forced sexual activity: Not on file   Other Topics Concern     Parent/sibling w/ CABG, MI or angioplasty before 65F 55M? No   Social History Narrative     Not on file       Review Of Systems:  Skin: No rash, pruritis, or skin pigmentation  Eyes: No changes in vision  Ears/Nose/Throat: No changes in hearing, no nosebleeds  Respiratory: No shortness of breath, dyspnea on exertion, cough, or hemoptysis  Cardiovascular: No chest pain or palpitations  Gastrointestinal: No diarrhea or constipation. No abdominal pain. No hematochezia  Genitourinary: see HPI  Musculoskeletal: No pain or swelling of joints, normal range of motion  Neurologic: No weakness or tremors  Psychiatric: No recent changes in memory or mood  Hematologic/Lymphatic/Immunologic: No easy bruising or enlarged lymph nodes  Endocrine: No weight gain or loss      PHYSICAL EXAM:    There were no vitals taken for this visit.    Constitutional: Well developed. Conversant and in no acute distress  Eyes: Anicteric sclera, conjunctiva clear, normal extraocular movements  ENT: Normocephalic and atraumatic,   Skin: Warm and dry. No rashes or lesions  Cardiac: No peripheral edema  Back/Flank: Not done  CNS/PNS: Normal musculature and movements, moves all extremities normally  Respiratory: Normal non-labored breathing  Abdomen: Soft nontender and nondistended  Peripheral Vascular: No peripheral edema  Mental Status/Psych: Alert and Oriented x 3. Normal mood and affect    Penis: Not done  Scrotal Skin: Not done  Testicles: Not done  Epididymis: Not done  Digital Rectal Exam:     Cystoscopy: I performed flexible cystoscopy and the patient and a papillary bladder tumor growing on a small stalk from the median lobe of the prostate. The rest of the bladder was normal throughout. On retroflexion the scope he had a circumferentially enlarged prostate with a circumferentially enlarged intravesical segment.    Imaging:  None    Urinalysis: UA RESULTS:  Recent Labs   Lab Test 03/02/21  1007 09/25/20  1538 09/25/20  1538   COLOR Yellow   < > Yellow   APPEARANCE Clear   < > Clear   URINEGLC Negative   < > Negative   URINEBILI Negative   < > Small*   URINEKETONE Negative   < > Trace*   SG 1.015   < > 1.020   UBLD Moderate*   < > Moderate*   URINEPH 6.5   < > 6.0   PROTEIN Negative   < > 100*   UROBILINOGEN 0.2   < > 2.0*   NITRITE Negative   < > Negative   LEUKEST Negative   < > Small*   RBCU  --   --  5-10*   WBCU  --   --  25-50*    < > = values in this interval not displayed.       PSA: 6    Post Void Residual:     Other labs: None today      IMPRESSION:  Enlarged prostate, bladder tumor, urinary retention    PLAN:  He had a bladder tumor discovered on cystoscopy today. This was a surprising finding and that he has never had hematuria on any prior urine samples. I recommended transurethral resection of bladder tumor in the operating room. We discussed the procedure.  The procedure to remove his bladder tumor will actually be a transurethral resection of the prostate.  I will need to resect a small area of the median lobe of the prostate to remove the tumor.  We discussed the risks of the procedure and he wishes to proceed.    His Frey catheter was replaced today.  He will leave the Frey catheter in place until surgery.  I counseled him that he will need a Frey catheter placed for this 1 night after the procedure.  He may be able to discharge home the same day after the procedure but he may need to stay 1 night for observation as well.  He also needs a CT urogram performed and this was ordered for him.    We will continue to follow the PSA closely, it is improving      Yosi Campbell M.D.

## 2021-03-09 NOTE — NURSING NOTE
Chief Complaint   Patient presents with     Urinary Retention     Pt here for a psa     Prior to the start of the procedure and with procedural staff participation, I verbally confirmed the patient s identity using two indicators, relevant allergies, that the procedure was appropriate and matched the consent or emergent situation, and that the correct equipment/implants were available. Immediately prior to starting the procedure I conducted the Time Out with the procedural staff and re-confirmed the patient s name, procedure, and site/side. I have wiped the patient off with the povidone-Iodine solution, draped them,  used Lidocaine hydrochloride jelly, and instilled sterile water into the bladder. (The Joint Commission universal protocol was followed.)  Yes    Sedation (Moderate or Deep): Urojet    5mL 2% lidocaine hydrochloride Urojet instilled into urethra.    NDC# [address]-0  Lot #: [id number]   Expiration Date:  7-22      Catheter removal documentation on 3/9/2021:    Chrystal Solis presents to the clinic for catheter removal.  Reason for removal: to get a cystoscopy  Order has been verified. YES  Catheter successfully removed at 11:21 AM without immediate complication.  200 cc's of urine present in the catheter bag.  Urethral meatus is free of secretions and encrustation.  The patient is afebrile.  The patient tolerated the procedure and was instructed to follow up with their PCP or consultant as planned, monitor for catheter dysfunction, monitor for pain or discomfort, return or call for pain, fever, leakage or decreased urine flow and watch for signs of infection        Catheter insertion documentation on 3/9/2021:    Chrystalhugo Solis presents to the clinic for catheter insertion.  Reason for insertion: after cystoscopy has bladder cancer  Order has been verified. YES  Catheter successfully inserted into the urethral meatus in the usual sterile fashion without immediate complication.  Type of catheter  placed: 16 Malay Coude catheter  Urine is yellow in color.  60 cc's of urine output returned.  Balloon was filled with 10cc's of normal saline.  Securement device placed for the catheter.  The patient tolerated the procedure and was instructed to follow up with their PCP or consultant as planned, monitor for catheter dysfunction, monitor for pain or discomfort, return or call for pain, fever, leakage or decreased urine flow and watch for signs of infection        Martha Loyd

## 2021-03-10 ENCOUNTER — OFFICE VISIT (OUTPATIENT)
Dept: FAMILY MEDICINE | Facility: CLINIC | Age: 71
End: 2021-03-10
Payer: COMMERCIAL

## 2021-03-10 VITALS
OXYGEN SATURATION: 97 % | HEART RATE: 66 BPM | WEIGHT: 236.3 LBS | TEMPERATURE: 98.6 F | DIASTOLIC BLOOD PRESSURE: 77 MMHG | SYSTOLIC BLOOD PRESSURE: 121 MMHG | BODY MASS INDEX: 32.05 KG/M2

## 2021-03-10 DIAGNOSIS — G62.9 PERIPHERAL POLYNEUROPATHY: ICD-10-CM

## 2021-03-10 DIAGNOSIS — E78.5 HYPERLIPIDEMIA LDL GOAL <100: ICD-10-CM

## 2021-03-10 DIAGNOSIS — I10 BENIGN ESSENTIAL HYPERTENSION: ICD-10-CM

## 2021-03-10 DIAGNOSIS — C02.9 SQUAMOUS CELL CARCINOMA OF TONGUE (H): ICD-10-CM

## 2021-03-10 DIAGNOSIS — Z01.818 PREOP GENERAL PHYSICAL EXAM: Primary | ICD-10-CM

## 2021-03-10 DIAGNOSIS — R97.20 ELEVATED PROSTATE SPECIFIC ANTIGEN (PSA): ICD-10-CM

## 2021-03-10 DIAGNOSIS — D49.4 BLADDER TUMOR: ICD-10-CM

## 2021-03-10 PROCEDURE — 99214 OFFICE O/P EST MOD 30 MIN: CPT | Performed by: INTERNAL MEDICINE

## 2021-03-10 NOTE — PROGRESS NOTES
[address] [address]  Phone: [phone]  Primary Provider: Nolan New  Pre-op Performing Provider: NOLAN NEW      PREOPERATIVE EVALUATION:  Today's date: 3/10/2021    Chrystal Solis is a 70 year old male who presents for a preoperative evaluation.    Surgical Information:  Surgery/Procedure: CYSTOSCOPY, WITH TRANSURETHRAL RESECTION PROSTATE  Surgery Location:  OR  Surgeon: Yosi Campbell MD  Surgery Date: 3/24/2021  Time of Surgery: 7:25 am   Where patient plans to recover: At a TCU (Transitional Care Unit) or at Home with family (unsure per patient)   Fax number for surgical facility: Note does not need to be faxed, will be available electronically in Epic.    Type of Anesthesia Anticipated: General    Assessment & Plan     The proposed surgical procedure is considered LOW risk.    Preop general physical exam  Suitable candidate for planned surgery and anesthesia     Bladder tumor  He had a lot of questions about his prognosis    Squamous cell carcinoma of tongue (H)  This has been stable     Benign essential hypertension  Well controlled     Hyperlipidemia LDL goal <100  On statin therapy     Peripheral polyneuropathy      Elevated prostate specific antigen (PSA)  The PSA seems to be trending downward           Risks and Recommendations:  The patient has the following additional risks and recommendations for perioperative complications:   - No identified additional risk factors other than previously addressed    Medication Instructions:  Patient is to take all scheduled medications on the day of surgery    RECOMMENDATION:  APPROVAL GIVEN to proceed with proposed procedure, without further diagnostic evaluation.    30 minutes spent on the date of the encounter doing chart review, history and exam, documentation and further activities as noted above        Subjective     HPI related to upcoming procedure: He had cystoscopy due to urinary  retention.  A bladder tumor was identified. This patient reports being able to perform 4 METS of physical activity without chest pain or dyspnea.       Preop Questions 3/10/2021   1. Have you ever had a heart attack or stroke? No   2. Have you ever had surgery on your heart or blood vessels, such as a stent placement, a coronary artery bypass, or surgery on an artery in your head, neck, heart, or legs? No   3. Do you have chest pain with activity? No   4. Do you have a history of  heart failure? No   5. Do you currently have a cold, bronchitis or symptoms of other infection? No   6. Do you have a cough, shortness of breath, or wheezing? No   7. Do you or anyone in your family have previous history of blood clots? No   8. Do you or does anyone in your family have a serious bleeding problem such as prolonged bleeding following surgeries or cuts? No   9. Have you ever had problems with anemia or been told to take iron pills? No   10. Have you had any abnormal blood loss such as black, tarry or bloody stools? No   11. Have you ever had a blood transfusion? No   12. Are you willing to have a blood transfusion if it is medically needed before, during, or after your surgery? Yes   13. Have you or any of your relatives ever had problems with anesthesia? No   14. Do you have sleep apnea, excessive snoring or daytime drowsiness? No   15. Do you have any artifical heart valves or other implanted medical devices like a pacemaker, defibrillator, or continuous glucose monitor? No   16. Do you have artificial joints? No   17. Are you allergic to latex? No     Review of Systems  Constitutional, neuro, ENT, endocrine, pulmonary, cardiac, gastrointestinal, genitourinary, musculoskeletal, integument and psychiatric systems are negative, except as otherwise noted.    Patient Active Problem List    Diagnosis Date Noted     Elevated prostate specific antigen (PSA) 03/09/2021     Priority: Medium     Added automatically from request for  surgery [id number]       Advanced directives, counseling/discussion 12/18/2017     Priority: Medium     Advance Care Planning 12/18/2017: ACP Review of Chart / Resources Provided:  Reviewed chart for advance care plan.  Chrystal Solis has no plan or code status on file. Discussed available resources and provided with information.   Added by Naomi Saenz              Benign essential hypertension 01/11/2017     Priority: Medium     Hyperlipidemia LDL goal <100 01/11/2017     Priority: Medium     Peripheral polyneuropathy 01/11/2017     Priority: Medium     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     Priority: Medium      Past Medical History:   Diagnosis Date     Benign essential hypertension 1/11/2017     Bronchitis      Hearing loss, mixed, bilateral      Hernia, abdominal      Hyperlipidemia LDL goal <130 1/11/2017     Hypertension      Intervertebral cervical disc disorder with myelopathy, cervical region 2006    had injections     Malignant neoplasm (H) 12/6/10    squamous cell of tongue      Mumps      Numbness and tingling     numbness in toes     Penile discharge     urethritis, as a teenager     Peripheral polyneuropathy 1/11/2017     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     Past Surgical History:   Procedure Laterality Date     BIOPSY       COLONOSCOPY  3/2009     COLONOSCOPY N/A 3/8/2017    Procedure: COMBINED COLONOSCOPY, SINGLE OR MULTIPLE BIOPSY/POLYPECTOMY BY BIOPSY;  Surgeon: Yung Maguire MD;  Location:  GI     ENT SURGERY       EXCISE LESION TRUNK N/A 2/3/2017    Procedure: EXCISE LESION TRUNK;  Surgeon: Jaswinder Lopez MD;  Location: [facility]     EXCISE LESION UPPER EXTREMITY Left 2/3/2017    Procedure: EXCISE LESION UPPER EXTREMITY;  Surgeon: Jaswinder Lopez MD;  Location: [facility]     HEAD & NECK SURGERY       HERNIA REPAIR Left 2002     ORTHOPEDIC SURGERY  2002    torn ligament right shoulder     SOFT TISSUE SURGERY  12/6/10    excisional biopsy of tongue  lesion     tonsillectomy  Age 6     Current Outpatient Medications   Medication Sig Dispense Refill     amLODIPine (NORVASC) 10 MG tablet Take 1 tablet (10 mg) by mouth daily 90 tablet 3     atorvastatin (LIPITOR) 10 MG tablet Take 1 tablet (10 mg) by mouth daily 90 tablet 3     hydrochlorothiazide (HYDRODIURIL) 25 MG tablet TAKE ONE TABLET DAILY AS NEEDED 90 tablet 3     irbesartan (AVAPRO) 300 MG tablet Take 1 tablet (300 mg) by mouth daily 90 tablet 3     tamsulosin (FLOMAX) 0.4 MG capsule Take 1 capsule (0.4 mg) by mouth daily 90 capsule 3     sildenafil (VIAGRA) 25 MG tablet Take 4 tablets (100 mg) by mouth daily as needed 30 min to 4 hrs before sex. Do not use with nitroglycerin, terazosin or doxazosin. (Patient not taking: Reported on 3/9/2021) 16 tablet 11     tadalafil (CIALIS) 20 MG tablet Take 1 tablet (20 mg) by mouth daily as needed (erectile dysfunction) (Patient not taking: Reported on 3/9/2021) 10 tablet 11     tobramycin (TOBREX) 0.3 % ophthalmic solution Place 1-2 drops into both eyes every 2 hours (Patient not taking: Reported on 3/9/2021) 5 mL 0       Allergies   Allergen Reactions     Gadolinium Nausea and Vomiting     Unknown per patient if it is an allergy - per patient report that was not notified that was not supposed to eat before MRI         Social History     Tobacco Use     Smoking status: Former Smoker     Packs/day: 0.25     Years: 2.00     Pack years: 0.50     Types: Cigarettes     Quit date: 1978     Years since quittin.2     Smokeless tobacco: Never Used     Tobacco comment: smoked socially in college   Substance Use Topics     Alcohol use: Yes     Alcohol/week: 0.0 standard drinks     Comment: 2 drinks per day     Family History   Problem Relation Age of Onset     Cerebrovascular Disease Mother      Hypertension Mother      Respiratory Father         heavy smoker     Cancer Maternal Grandfather         lip cancer--heavy smoker     Unknown/Adopted Son      Unknown/Adopted  Daughter      History   Drug Use No         Objective     /77   Pulse 66   Temp 98.6  F (37  C) (Temporal)   Wt 107.2 kg (236 lb 4.8 oz)   SpO2 97%   BMI 32.05 kg/m      Physical Exam    GENERAL APPEARANCE: healthy, alert and no distress     EYES: EOMI,  PERRL     HENT: ear canals and TM's normal     NECK: no adenopathy, no asymmetry, masses, or scars and thyroid normal to palpation     RESP: lungs clear to auscultation - no rales, rhonchi or wheezes     CV: regular rates and rhythm, normal S1 S2, no S3 or S4 and no murmur, click or rub     ABDOMEN:  soft, nontender, no HSM or masses and bowel sounds normal     MS: extremities normal- no gross deformities noted, no evidence of inflammation in joints, FROM in all extremities.     SKIN: no suspicious lesions or rashes     NEURO: Normal strength and tone, sensory exam grossly normal, mentation intact and speech normal     PSYCH: mentation appears normal. and affect normal/bright     LYMPHATICS: No cervical adenopathy    Recent Labs   Lab Test 09/25/20  1650 04/23/19  0731   HGB 16.5 17.2    228    136   POTASSIUM 3.5 3.7   CR 0.92 0.84        Diagnostics:     EKG:  Sinus rhythm rate 89 PVCs    Revised Cardiac Risk Index (RCRI):  The patient has the following serious cardiovascular risks for perioperative complications:   - No serious cardiac risks = 0 points     RCRI Interpretation: 0 points: Class I (very low risk - 0.4% complication rate)           Signed Electronically by: Lam Rodríguez MD  Copy of this evaluation report is provided to requesting physician.

## 2021-03-20 ENCOUNTER — HOSPITAL ENCOUNTER (OUTPATIENT)
Dept: LAB | Facility: CLINIC | Age: 71
Discharge: HOME | End: 2021-03-20
Attending: UROLOGY | Admitting: UROLOGY
Payer: COMMERCIAL

## 2021-03-20 DIAGNOSIS — Z11.59 ENCOUNTER FOR SCREENING FOR OTHER VIRAL DISEASES: ICD-10-CM

## 2021-03-20 LAB
LABORATORY COMMENT REPORT: NORMAL
SARS-COV-2 RNA RESP QL NAA+PROBE: NEGATIVE
SARS-COV-2 RNA RESP QL NAA+PROBE: NORMAL
SPECIMEN SOURCE: NORMAL
SPECIMEN SOURCE: NORMAL

## 2021-03-20 PROCEDURE — U0005 INFEC AGEN DETEC AMPLI PROBE: HCPCS | Performed by: UROLOGY

## 2021-03-20 PROCEDURE — U0003 INFECTIOUS AGENT DETECTION BY NUCLEIC ACID (DNA OR RNA); SEVERE ACUTE RESPIRATORY SYNDROME CORONAVIRUS 2 (SARS-COV-2) (CORONAVIRUS DISEASE [COVID-19]), AMPLIFIED PROBE TECHNIQUE, MAKING USE OF HIGH THROUGHPUT TECHNOLOGIES AS DESCRIBED BY CMS-2020-01-R: HCPCS | Performed by: UROLOGY

## 2021-03-24 ENCOUNTER — ANESTHESIA EVENT (OUTPATIENT)
Dept: SURGERY | Facility: CLINIC | Age: 71
End: 2021-03-24
Payer: COMMERCIAL

## 2021-03-24 ENCOUNTER — ANESTHESIA (OUTPATIENT)
Dept: SURGERY | Facility: CLINIC | Age: 71
End: 2021-03-24
Payer: COMMERCIAL

## 2021-03-24 ENCOUNTER — HOSPITAL ENCOUNTER (OUTPATIENT)
Facility: CLINIC | Age: 71
Discharge: HOME | End: 2021-03-24
Attending: UROLOGY | Admitting: UROLOGY
Payer: COMMERCIAL

## 2021-03-24 VITALS
WEIGHT: 230 LBS | RESPIRATION RATE: 16 BRPM | OXYGEN SATURATION: 97 % | DIASTOLIC BLOOD PRESSURE: 76 MMHG | HEIGHT: 72 IN | HEART RATE: 78 BPM | TEMPERATURE: 97.5 F | SYSTOLIC BLOOD PRESSURE: 131 MMHG | BODY MASS INDEX: 31.15 KG/M2

## 2021-03-24 DIAGNOSIS — R97.20 ELEVATED PROSTATE SPECIFIC ANTIGEN (PSA): ICD-10-CM

## 2021-03-24 DIAGNOSIS — D49.4 BLADDER TUMOR: Primary | ICD-10-CM

## 2021-03-24 LAB
CREAT SERPL-MCNC: 0.8 MG/DL (ref 0.66–1.25)
GFR SERPL CREATININE-BSD FRML MDRD: >90 ML/MIN/{1.73_M2}
HGB BLD-MCNC: 16.4 G/DL (ref 13.3–17.7)
POTASSIUM SERPL-SCNC: 3.8 MMOL/L (ref 3.4–5.3)

## 2021-03-24 PROCEDURE — 370N000017 HC ANESTHESIA TECHNICAL FEE, PER MIN: Performed by: UROLOGY

## 2021-03-24 PROCEDURE — 250N000011 HC RX IP 250 OP 636: Performed by: NURSE ANESTHETIST, CERTIFIED REGISTERED

## 2021-03-24 PROCEDURE — 52601 PROSTATECTOMY (TURP): CPT | Performed by: UROLOGY

## 2021-03-24 PROCEDURE — 84132 ASSAY OF SERUM POTASSIUM: CPT | Performed by: ANESTHESIOLOGY

## 2021-03-24 PROCEDURE — 710N000009 HC RECOVERY PHASE 1, LEVEL 1, PER MIN: Performed by: UROLOGY

## 2021-03-24 PROCEDURE — 360N000076 HC SURGERY LEVEL 3, PER MIN: Performed by: UROLOGY

## 2021-03-24 PROCEDURE — 272N000001 HC OR GENERAL SUPPLY STERILE: Performed by: UROLOGY

## 2021-03-24 PROCEDURE — 88305 TISSUE EXAM BY PATHOLOGIST: CPT | Mod: 26 | Performed by: PATHOLOGY

## 2021-03-24 PROCEDURE — 36415 COLL VENOUS BLD VENIPUNCTURE: CPT | Performed by: ANESTHESIOLOGY

## 2021-03-24 PROCEDURE — 250N000009 HC RX 250: Performed by: NURSE ANESTHETIST, CERTIFIED REGISTERED

## 2021-03-24 PROCEDURE — 258N000003 HC RX IP 258 OP 636: Performed by: ANESTHESIOLOGY

## 2021-03-24 PROCEDURE — 272N000002 HC OR SUPPLY OTHER OPNP: Performed by: UROLOGY

## 2021-03-24 PROCEDURE — 250N000009 HC RX 250: Performed by: ANESTHESIOLOGY

## 2021-03-24 PROCEDURE — 85018 HEMOGLOBIN: CPT | Performed by: ANESTHESIOLOGY

## 2021-03-24 PROCEDURE — 88305 TISSUE EXAM BY PATHOLOGIST: CPT | Mod: TC | Performed by: UROLOGY

## 2021-03-24 PROCEDURE — 258N000001 HC RX 258: Performed by: UROLOGY

## 2021-03-24 PROCEDURE — 82565 ASSAY OF CREATININE: CPT | Performed by: ANESTHESIOLOGY

## 2021-03-24 PROCEDURE — 250N000011 HC RX IP 250 OP 636: Performed by: UROLOGY

## 2021-03-24 PROCEDURE — 250N000011 HC RX IP 250 OP 636: Performed by: ANESTHESIOLOGY

## 2021-03-24 PROCEDURE — 710N000012 HC RECOVERY PHASE 2, PER MINUTE: Performed by: UROLOGY

## 2021-03-24 PROCEDURE — 999N000141 HC STATISTIC PRE-PROCEDURE NURSING ASSESSMENT: Performed by: UROLOGY

## 2021-03-24 PROCEDURE — 250N000013 HC RX MED GY IP 250 OP 250 PS 637: Performed by: UROLOGY

## 2021-03-24 RX ORDER — NALOXONE HYDROCHLORIDE 0.4 MG/ML
0.2 INJECTION, SOLUTION INTRAMUSCULAR; INTRAVENOUS; SUBCUTANEOUS
Status: DISCONTINUED | OUTPATIENT
Start: 2021-03-24 | End: 2021-03-24 | Stop reason: HOSPADM

## 2021-03-24 RX ORDER — SODIUM CHLORIDE, SODIUM LACTATE, POTASSIUM CHLORIDE, CALCIUM CHLORIDE 600; 310; 30; 20 MG/100ML; MG/100ML; MG/100ML; MG/100ML
INJECTION, SOLUTION INTRAVENOUS CONTINUOUS
Status: DISCONTINUED | OUTPATIENT
Start: 2021-03-24 | End: 2021-03-24 | Stop reason: HOSPADM

## 2021-03-24 RX ORDER — CEFAZOLIN SODIUM 2 G/100ML
2 INJECTION, SOLUTION INTRAVENOUS SEE ADMIN INSTRUCTIONS
Status: DISCONTINUED | OUTPATIENT
Start: 2021-03-24 | End: 2021-03-24 | Stop reason: HOSPADM

## 2021-03-24 RX ORDER — KETOROLAC TROMETHAMINE 30 MG/ML
INJECTION, SOLUTION INTRAMUSCULAR; INTRAVENOUS PRN
Status: DISCONTINUED | OUTPATIENT
Start: 2021-03-24 | End: 2021-03-24

## 2021-03-24 RX ORDER — OXYCODONE HYDROCHLORIDE 5 MG/1
5 TABLET ORAL ONCE
Status: COMPLETED | OUTPATIENT
Start: 2021-03-24 | End: 2021-03-24

## 2021-03-24 RX ORDER — LABETALOL 20 MG/4 ML (5 MG/ML) INTRAVENOUS SYRINGE
10
Status: DISCONTINUED | OUTPATIENT
Start: 2021-03-24 | End: 2021-03-24 | Stop reason: HOSPADM

## 2021-03-24 RX ORDER — GLYCOPYRROLATE 0.2 MG/ML
INJECTION, SOLUTION INTRAMUSCULAR; INTRAVENOUS PRN
Status: DISCONTINUED | OUTPATIENT
Start: 2021-03-24 | End: 2021-03-24

## 2021-03-24 RX ORDER — CEFAZOLIN SODIUM 2 G/100ML
2 INJECTION, SOLUTION INTRAVENOUS
Status: DISCONTINUED | OUTPATIENT
Start: 2021-03-24 | End: 2021-03-24 | Stop reason: HOSPADM

## 2021-03-24 RX ORDER — DEXAMETHASONE SODIUM PHOSPHATE 4 MG/ML
INJECTION, SOLUTION INTRA-ARTICULAR; INTRALESIONAL; INTRAMUSCULAR; INTRAVENOUS; SOFT TISSUE PRN
Status: DISCONTINUED | OUTPATIENT
Start: 2021-03-24 | End: 2021-03-24

## 2021-03-24 RX ORDER — HYDRALAZINE HYDROCHLORIDE 20 MG/ML
2.5-5 INJECTION INTRAMUSCULAR; INTRAVENOUS EVERY 10 MIN PRN
Status: DISCONTINUED | OUTPATIENT
Start: 2021-03-24 | End: 2021-03-24 | Stop reason: HOSPADM

## 2021-03-24 RX ORDER — PROPOFOL 10 MG/ML
INJECTION, EMULSION INTRAVENOUS PRN
Status: DISCONTINUED | OUTPATIENT
Start: 2021-03-24 | End: 2021-03-24

## 2021-03-24 RX ORDER — FENTANYL CITRATE 50 UG/ML
25-50 INJECTION, SOLUTION INTRAMUSCULAR; INTRAVENOUS
Status: DISCONTINUED | OUTPATIENT
Start: 2021-03-24 | End: 2021-03-24 | Stop reason: HOSPADM

## 2021-03-24 RX ORDER — ONDANSETRON 2 MG/ML
4 INJECTION INTRAMUSCULAR; INTRAVENOUS EVERY 30 MIN PRN
Status: DISCONTINUED | OUTPATIENT
Start: 2021-03-24 | End: 2021-03-24 | Stop reason: HOSPADM

## 2021-03-24 RX ORDER — FENTANYL CITRATE 50 UG/ML
INJECTION, SOLUTION INTRAMUSCULAR; INTRAVENOUS PRN
Status: DISCONTINUED | OUTPATIENT
Start: 2021-03-24 | End: 2021-03-24

## 2021-03-24 RX ORDER — NALOXONE HYDROCHLORIDE 0.4 MG/ML
0.4 INJECTION, SOLUTION INTRAMUSCULAR; INTRAVENOUS; SUBCUTANEOUS
Status: DISCONTINUED | OUTPATIENT
Start: 2021-03-24 | End: 2021-03-24 | Stop reason: HOSPADM

## 2021-03-24 RX ORDER — OXYCODONE HYDROCHLORIDE 5 MG/1
5 TABLET ORAL EVERY 6 HOURS PRN
Qty: 2 TABLET | Refills: 0 | Status: SHIPPED | OUTPATIENT
Start: 2021-03-24 | End: 2021-03-25

## 2021-03-24 RX ORDER — ACETAMINOPHEN 325 MG/1
975 TABLET ORAL ONCE
Status: DISCONTINUED | OUTPATIENT
Start: 2021-03-24 | End: 2021-03-24 | Stop reason: HOSPADM

## 2021-03-24 RX ORDER — DIMENHYDRINATE 50 MG/ML
25 INJECTION, SOLUTION INTRAMUSCULAR; INTRAVENOUS
Status: DISCONTINUED | OUTPATIENT
Start: 2021-03-24 | End: 2021-03-24 | Stop reason: HOSPADM

## 2021-03-24 RX ORDER — ONDANSETRON 4 MG/1
4 TABLET, ORALLY DISINTEGRATING ORAL EVERY 30 MIN PRN
Status: DISCONTINUED | OUTPATIENT
Start: 2021-03-24 | End: 2021-03-24 | Stop reason: HOSPADM

## 2021-03-24 RX ORDER — MEPERIDINE HYDROCHLORIDE 25 MG/ML
12.5 INJECTION INTRAMUSCULAR; INTRAVENOUS; SUBCUTANEOUS EVERY 5 MIN PRN
Status: DISCONTINUED | OUTPATIENT
Start: 2021-03-24 | End: 2021-03-24 | Stop reason: HOSPADM

## 2021-03-24 RX ORDER — LIDOCAINE 40 MG/G
CREAM TOPICAL
Status: DISCONTINUED | OUTPATIENT
Start: 2021-03-24 | End: 2021-03-24 | Stop reason: HOSPADM

## 2021-03-24 RX ORDER — DIAZEPAM 10 MG/2ML
2.5 INJECTION, SOLUTION INTRAMUSCULAR; INTRAVENOUS
Status: DISCONTINUED | OUTPATIENT
Start: 2021-03-24 | End: 2021-03-24 | Stop reason: HOSPADM

## 2021-03-24 RX ORDER — ONDANSETRON 2 MG/ML
INJECTION INTRAMUSCULAR; INTRAVENOUS PRN
Status: DISCONTINUED | OUTPATIENT
Start: 2021-03-24 | End: 2021-03-24

## 2021-03-24 RX ADMIN — FENTANYL CITRATE 150 MCG: 50 INJECTION, SOLUTION INTRAMUSCULAR; INTRAVENOUS at 07:36

## 2021-03-24 RX ADMIN — FENTANYL CITRATE 50 MCG: 50 INJECTION, SOLUTION INTRAMUSCULAR; INTRAVENOUS at 08:41

## 2021-03-24 RX ADMIN — MIDAZOLAM 1 MG: 1 INJECTION INTRAMUSCULAR; INTRAVENOUS at 07:34

## 2021-03-24 RX ADMIN — LIDOCAINE HYDROCHLORIDE 50 MG: 10 INJECTION, SOLUTION EPIDURAL; INFILTRATION; INTRACAUDAL; PERINEURAL at 07:36

## 2021-03-24 RX ADMIN — OXYCODONE HYDROCHLORIDE 5 MG: 5 TABLET ORAL at 09:00

## 2021-03-24 RX ADMIN — PROPOFOL 200 MG: 10 INJECTION, EMULSION INTRAVENOUS at 07:36

## 2021-03-24 RX ADMIN — SODIUM CHLORIDE, POTASSIUM CHLORIDE, SODIUM LACTATE AND CALCIUM CHLORIDE: 600; 310; 30; 20 INJECTION, SOLUTION INTRAVENOUS at 06:53

## 2021-03-24 RX ADMIN — ONDANSETRON HYDROCHLORIDE 4 MG: 2 INJECTION, SOLUTION INTRAVENOUS at 07:36

## 2021-03-24 RX ADMIN — GLYCOPYRROLATE 0.4 MG: 0.2 INJECTION, SOLUTION INTRAMUSCULAR; INTRAVENOUS at 07:36

## 2021-03-24 RX ADMIN — DEXAMETHASONE SODIUM PHOSPHATE 8 MG: 4 INJECTION, SOLUTION INTRA-ARTICULAR; INTRALESIONAL; INTRAMUSCULAR; INTRAVENOUS; SOFT TISSUE at 07:36

## 2021-03-24 RX ADMIN — CEFAZOLIN SODIUM 2 G: 2 INJECTION, SOLUTION INTRAVENOUS at 07:44

## 2021-03-24 RX ADMIN — KETOROLAC TROMETHAMINE 15 MG: 30 INJECTION, SOLUTION INTRAMUSCULAR at 07:36

## 2021-03-24 RX ADMIN — FENTANYL CITRATE 50 MCG: 50 INJECTION, SOLUTION INTRAMUSCULAR; INTRAVENOUS at 08:00

## 2021-03-24 ASSESSMENT — LIFESTYLE VARIABLES: TOBACCO_USE: 1

## 2021-03-24 ASSESSMENT — MIFFLIN-ST. JEOR: SCORE: 1846.27

## 2021-03-24 NOTE — ANESTHESIA PROCEDURE NOTES
Airway       Patient location during procedure: OR       Procedure Start/Stop Times: 3/24/2021 7:40 AM  Staff -        CRNA: Jimi Jain APRN CRNA       Performed By: CRNAIndications and Patient Condition       Indications for airway management: evi-procedural       Induction type:intravenous       Mask difficulty assessment: 1 - vent by mask    Final Airway Details       Final airway type: supraglottic airway    Supraglottic Airway Details        Type: LMA       Brand: I-Gel       LMA size: 5    Post intubation assessment        Placement verified by: capnometry, equal breath sounds and chest rise        Number of attempts at approach: 1       Number of other approaches attempted: 0       Secured with: plastic tape       Ease of procedure: easy       Dentition: Intact and Unchanged

## 2021-03-24 NOTE — ANESTHESIA CARE TRANSFER NOTE
Patient: Chrystal Solis    Procedure(s):  Cystoscopy with transurethral resection prostate    Diagnosis: Elevated prostate specific antigen (PSA) [R97.20]  Diagnosis Additional Information: No value filed.    Anesthesia Type:   General     Note:    Oropharynx: oropharynx clear of all foreign objects  Level of Consciousness: awake and drowsy  Oxygen Supplementation: room air    Independent Airway: airway patency satisfactory and stable  Dentition: dentition unchanged  Vital Signs Stable: post-procedure vital signs reviewed and stable  Report to RN Given: handoff report given  Patient transferred to: PACU    Handoff Report: Identifed the Patient, Identified the Reponsible Provider, Reviewed the pertinent medical history, Discussed the surgical course, Reviewed Intra-OP anesthesia mangement and issues during anesthesia, Set expectations for post-procedure period and Allowed opportunity for questions and acknowledgement of understanding      Vitals: (Last set prior to Anesthesia Care Transfer)  CRNA VITALS  3/24/2021 0747 - 3/24/2021 0820      3/24/2021             Pulse:  90    SpO2:  98 %    Resp Rate (observed):  (!) 5        Electronically Signed By: XIOMY Coates CRNA  March 24, 2021  8:20 AM

## 2021-03-24 NOTE — ANESTHESIA PREPROCEDURE EVALUATION
Anesthesia Pre-Procedure Evaluation    Patient: Chrystal Solis   MRN: [id number] : 1950        Preoperative Diagnosis: Elevated prostate specific antigen (PSA) [R97.20]   Procedure : Procedure(s):  Cystoscopy with transurethral resection prostate     Past Medical History:   Diagnosis Date     Arthritis      Benign essential hypertension 2017     Bronchitis      Hearing loss, mixed, bilateral      Hernia, abdominal      Hyperlipidemia LDL goal <130 2017     Hypertension      Intervertebral cervical disc disorder with myelopathy, cervical region 2006    had injections     Malignant neoplasm (H) 12/6/10    squamous cell of tongue      Mumps      Numbness and tingling     numbness in toes     Penile discharge     urethritis, as a teenager     Peripheral polyneuropathy 2017     Squamous cell carcinoma of tongue (H) 2010      Past Surgical History:   Procedure Laterality Date     BIOPSY       COLONOSCOPY  3/2009     COLONOSCOPY N/A 3/8/2017    Procedure: COMBINED COLONOSCOPY, SINGLE OR MULTIPLE BIOPSY/POLYPECTOMY BY BIOPSY;  Surgeon: Yung Maguire MD;  Location:  GI     ENT SURGERY       EXCISE LESION TRUNK N/A 2/3/2017    Procedure: EXCISE LESION TRUNK;  Surgeon: Jaswinder Lopez MD;  Location: [facility]     EXCISE LESION UPPER EXTREMITY Left 2/3/2017    Procedure: EXCISE LESION UPPER EXTREMITY;  Surgeon: Jaswinder Lopez MD;  Location: [facility]     HEAD & NECK SURGERY       HERNIA REPAIR Left      ORTHOPEDIC SURGERY  2002    torn ligament right shoulder     SOFT TISSUE SURGERY  12/6/10    excisional biopsy of tongue lesion     tonsillectomy  Age 6      Allergies   Allergen Reactions     Gadolinium Nausea and Vomiting     Unknown per patient if it is an allergy - per patient report that was not notified that was not supposed to eat before MRI       Social History     Tobacco Use     Smoking status: Former Smoker     Packs/day: 0.25     Years: 2.00     Pack years: 0.50      Types: Cigarettes     Quit date: 1978     Years since quittin.2     Smokeless tobacco: Never Used     Tobacco comment: smoked socially in college   Substance Use Topics     Alcohol use: Not Currently     Alcohol/week: 0.0 standard drinks      Wt Readings from Last 1 Encounters:   21 104.3 kg (230 lb)        Anesthesia Evaluation   Pt has had prior anesthetic. Type: General.    No history of anesthetic complications       ROS/MED HX  ENT/Pulmonary:  - neg pulmonary ROS   (+) tobacco use, Past use,     Neurologic:     (+) peripheral neuropathy,     Cardiovascular:     (+) Dyslipidemia hypertension-----    METS/Exercise Tolerance:     Hematologic:  - neg hematologic  ROS     Musculoskeletal:  - neg musculoskeletal ROS     GI/Hepatic:  - neg GI/hepatic ROS     Renal/Genitourinary:     (+) BPH,     Endo:  - neg endo ROS     Psychiatric/Substance Use:  - neg psychiatric ROS     Infectious Disease:  - neg infectious disease ROS     Malignancy:   (+) Malignancy, History of Prostate and Other.Prostate CA Active status post Surgery.  Other CA Throat Ca  11 years ago Remission status post Surgery, Radiation and Chemo.    Other:  - neg other ROS          Physical Exam    Airway        Mallampati: II   TM distance: > 3 FB   Neck ROM: full   Mouth opening: > 3 cm    Respiratory Devices and Support         Dental  no notable dental history         Cardiovascular   cardiovascular exam normal       Rhythm and rate: regular and normal     Pulmonary   pulmonary exam normal        breath sounds clear to auscultation       Other findings: Lab Test        20                       1650          0731          1214          WBC          9.8          4.9          5.9           HGB          16.5         17.2         18.2*         MCV          87           87           86            PLT          222          228          220            Lab Test        20                        1650          0731          1214          NA           137          136          136           POTASSIUM    3.5          3.7          3.6           CHLORIDE     104          101          102           CO2          24           29           24            BUN          15           14           12            CR           0.92         0.84         0.72          ANIONGAP     9            6            10            MARGARITA          9.3          9.2          9.2           GLC          110*         104*         101*          /   Remote ECG Showed occasional PVCs       OUTSIDE LABS:  CBC:   Lab Results   Component Value Date    WBC 9.8 09/25/2020    WBC 4.9 04/23/2019    HGB 16.5 09/25/2020    HGB 17.2 04/23/2019    HCT 47.9 09/25/2020    HCT 48.3 04/23/2019     09/25/2020     04/23/2019     BMP:   Lab Results   Component Value Date     09/25/2020     04/23/2019    POTASSIUM 3.5 09/25/2020    POTASSIUM 3.7 04/23/2019    CHLORIDE 104 09/25/2020    CHLORIDE 101 04/23/2019    CO2 24 09/25/2020    CO2 29 04/23/2019    BUN 15 09/25/2020    BUN 14 04/23/2019    CR 0.92 09/25/2020    CR 0.84 04/23/2019     (H) 09/25/2020     (H) 04/23/2019     COAGS:   Lab Results   Component Value Date    INR 1.03 09/21/2011     POC: No results found for: BGM, HCG, HCGS  HEPATIC:   Lab Results   Component Value Date    ALBUMIN 3.4 09/25/2020    PROTTOTAL 7.3 09/25/2020    ALT 30 09/25/2020    AST 14 09/25/2020    ALKPHOS 71 09/25/2020    BILITOTAL 1.0 09/25/2020    BILIDIRECT 0.2 10/06/2011     OTHER:   Lab Results   Component Value Date    A1C 5.2 08/09/2012    MARGARITA 9.3 09/25/2020    PHOS 3.3 03/23/2011    MAG 2.1 03/23/2011    TSH 3.33 10/21/2020    .0 09/25/2020       Anesthesia Plan    ASA Status:  2      Anesthesia Type: General.     - Airway: LMA   Induction: Intravenous.   Maintenance: Balanced.   Techniques and Equipment:     - Airway: Video-Laryngoscope         Consents    Anesthesia  Plan(s) and associated risks, benefits, and realistic alternatives discussed. Questions answered and patient/representative(s) expressed understanding.     - Discussed with:  Patient      - Extended Intubation/Ventilatory Support Discussed: No.      - Patient is DNR/DNI Status: No    Use of blood products discussed: No .     Postoperative Care    Pain management: IV analgesics, Oral pain medications, Multi-modal analgesia.   PONV prophylaxis: Ondansetron (or other 5HT-3), Dexamethasone or Solumedrol     Comments:    Pt wishes for us to look at oropharynx to see if any abnormalities.            Mike Joseph MD

## 2021-03-24 NOTE — DISCHARGE INSTRUCTIONS
RIVERA CATHETER CARE    What is a  Rivera Catheter?  A Rivera catheter is a tube that drains urine from the bladder.  Your doctor or nurse will tell you why you have it and how long you may need it.    The Rivera is held in your bladder by a small balloon filled with water.  When it is time to remove it, we will remove the water from the balloon.  Or we may teach you how to remove the Rivera at home.    Your clinic or nurse may change the tube regularly.    The Rivera connects to tubing attached to a bag-either a large drainage bag or a smaller leg bag.  Urine flows through the Rivera into this bag.  You will empty the bag regularly.    We will teach you how to care for the tube at home.  If you have any questions, be sure to ask.    BASIC CARE  Prevent Infections    If germs enter the body, they may cause an infection.  Germs can get in:  -  Where the tube enters your body.  -  Where the tube connects to the bag.  -  Through the drainage spout on the bag.    To reduce the risk of infection, follow all the steps in the booklet carefully.    Always begin by washing your hands for 15 seconds.  Don't touch the spout of the bag with your fingers.  Be sure the spout doesn't touch the toilet.    Clean the skin around the tube each day    Don't take a tub bath while you have the tube.  You may take a shower.    Clean the area around the tube each day.  Use soap, water and a wash cloth.    -  Gently remove any crust or sticky material from the tube.  Do not move it too much, as this can cause pain, discomfort and infection.    -  Females:  Wash the genital area front to back.  After using the toilet (having a bowel movement), wipe backward and away from the tube.    -  Males (not circumcised):  Pull back the foreskin to clean around the tube.  Then, return the foreskin to its normal position.  If crust builds up around it during the day, you may need to repeat this process in the evening.    -  Rinse well to remove all the  soap.    Do not use creams, powders or ointments on the skin around the tube.    Secure the tube to your upper thigh (to keep it from pulling out)    Tape the tube to your upper thigh, leaving a little bit of slack.  This will help prevent the tube from getting pulled out by accident.    Your nurse can show you how to secure the tube.  Follow these steps:    1.  Place a long piece of tape on your thigh.  You may want to shave any hair from the area first.    2.  Wrap another long piece of tape around the tube.  Tape this to the first piece of tape.  There should be enough slack in the tube to let you stand and walk comfortably.    3.  Change the tape every two to three days, switching thighs each time.    You may ask your nurse or pharmacy about other products to help secure the tube to your leg (such as plastic or cloth Velcro straps).    Keep your bag and its tubing lower than your bladder    Keep your bag and its tubing below the bladder at all times.  This will prevent urine from flowing back into the bladder.    To drain well, the bag needs to be the lowest part of the system.  Any loops of tubing must be higher than the bag.    -  When using a leg bag, take the bag off your leg if you lie down.  Hang it over the edge of the bed or couch.  You may want to use a safety pin to secure the bag.  Be careful not to poke the bag or tubing with the pin.    -  At night, coil extra loops of the tubing on the bed.  Use a clip to hold the loops.  Hang the bag on the side of the bed (or place it on the floor in a bowl, bucket or clean trash can).    Keep your tube from getting blocked  Tips:  -  Empty the urine from your bag regularly.  You may want to empty it when the bag becomes half-full.    -  Check the tubing often for kinks that may block the flow of urine.    -  Drink plenty of liquids-at least eight extra glasses of water each day (unless your doctor tells you not to).    -  If you will have the tube for a long  time, it should be changed about every three to six weeks.  Your doctor will tell you how often to have your tube changed.  You may need to come back to the clinic, or your home nurse will change your tube.    -  Your doctor or nurse may teach you how to flush the tube, if it often gets plugged.      CHOOSING YOUR BAG    If you will only use the large bag    Some people prefer to use the large drainage bag night and day.  This requires less time and effort-and fewer supplies.  It is safer, too:  You don't have to change the bag as often, and this lowers the risk of infection.    -  Change the bag once a month or when it leaks.    -  You may keep it in a shopping bag with handles during the day.  This allows you to move around easily, and it keeps the bag and tubing lower than your bladder.    If you will use a leg bag    Other people prefer to use a leg bag during the day and switch back to the larger bag at night.  A leg bag gives you more freedom to move around.  You can keep the bag hidden under loose-fitting slacks or a long skirt.    Because it is a smaller bag, you will need to empty it more often.  Also, each time you change bags, germs can get into your body.    To set up a new leg bag, follow the steps below.  These steps are for the Conveen leg bag.  If you use another brand, follow the directions on the package insert.    1.  Wash your hands for 15 seconds.  Use soap and water.  Clean your work area with alcohol (or soap and water) and a paper towel.    2.  Place these items on your clean work area:  -  Leg bag kit (opened)  -  Clean scissors  -  Alcohol pads    3.  Button the leg straps through the top and bottom of the bag.  Place the wide strap at the top of the bag.  You may trim the straps to fit your lower leg.  The bag will hang below your knee.    4.  Close the drainage spout at the bottom of the bag.    5.  Decide how long the bag's tubing needs to be.  It should reach below the knee.  Allow a  little slack in the tubing, so you can walk and sit freely.  If you decide to shorten the tubing:    -  Use an alcohol pad to clean your scissors well.    -  Use another alcohol pad to wipe the tubing.    -  Cut the tubing to the right size.  Do not allow the tip of the tubing to touch anything.    6.  Insert the white spout into the bag's tubing.    -  Keep the gray cover on the large end.    -  Push the small end into the tubing.  Once it's in, it can't be removed.    Changing bags    1.  Wash your hands for 15 seconds.  You may then put on clean gloves, if you wish.    2.  Place a towel under the tubes to catch any drops of urine.    3.  Use an alcohol pad to clean where the current bag connects with the tube.  Wipe three times in a row.    4.  Gently twist the tubes apart.  Don't use your nails.  (Nails often carry germs).  Squeeze the tube gently to keep urine from dripping out.    5.  Connect the tubing from the new bag to the bladder tube.  Do not touch the ends.  Check that the tubes connect tightly.    Cleaning your bag    Before you can reuse a bag, you must wash it with soap and rinse it with cleaning solution.  Clean the bag as soon as you disconnect it from the tube.  Do not re-use a bag without cleaning it first.  You may use the bag for one week.  After that, throw it away.    If you are not using a leg bag, you must change the bag at least once a month.  You don't need to clean it-just throw it away.    To clean a bag, follow these steps:    1.  Clean your work area with alcohol (or soap and water) and a paper towel.  Wash your hands with soap and water.    2.  Place these items on your clean work area:    -  Clean funnel    -  Liquid dish soap    -  Cleaning solution (choose one):      1/4 cup white vinegar + 3/4 cup water, or      15mL (milliliters) bleach + 150mL water      (If using bleach, mix a new batch each day).    -  Clean paper towel    -  Clean towel or storage container    3.  Empty the  urine from the bag into the toilet.    4.  Fill the bag with cool tap water.  A small funnel will help direct the stream of water.    5.  Drain and fill the bag again, adding a couple of drops of dish soap.  Gently squeeze the bag several times to clean the inside.  Drain the water into the toilet.  Rinse the bag well with tap water.    6.  Fill the bag with your cleaning solution.  Gently squeeze the bag several times.    -  For vinegar and water:  Let it sit for 30 minutes.    -  For bleach and water:  Let it sit for 30 seconds.    7.  Empty the bag into the toilet.    8.  Hang to air-dry with both ends pointing down.  Tips:  -  Pull the sides of the bag apart to speed drying.    -  Do not hang the bag or tubing over a radiator or other source of heat.  This may lead to germs and infection.    -  You may wish to use a  to hang the bag.    -  You may cover the end of the tubing with clean paper towel.  Use a rubber band to hold the paper towel in place.    9.  After the bag and tubing have dried, store them in a clean towel or covered container.  If you used a paper towel, you may remove it.    -  Before you re-use the bag, clean the end of the tubing with an alcohol pad.    When to call for help    Call your home care nurse or doctor's office right away if:    -  The tube comes out.  (In this case, it must be replaced).    -  No urine drains through the tube, or there's less urine than normal.    -  Your urine looks bloody or cloudy, it has changed color, or you see large blood clots.    -  Your urine has a bad odor.    -  You have pain in your back or lower belly area (abdomen).    -  You have a fever over 101 degrees F (38.3 degrees C), taken under the tongue.    -  Urine leaks around the tube for more than a day or two.    -  The skin around your tube is swollen, red, very tender or draining pus.    -  You have swollen testicles.    Your tube may cause some pain or discomfort at first.  Your pain  should keep getting better with time.  If it gets worse, doesn't improve or cannot be controlled with pain medicine, see your doctor.            CYSTOSCOPY DISCHARGE INSTRUCTIONS  [facility]  [address]  [phone]    YOU MAY GO BACK TO YOUR NORMAL DIET AND ACTIVITY, UNLESS YOUR DOCTOR TELLS YOU NOT TO.    FOR THE NEXT TWO DAYS, YOU MAY NOTICE:    SOME BLOOD IN YOUR URINE.  SOME BURNING WHEN YOU URINATE.  AN URGE TO URINATE MORE OFTEN.  BLADDER SPASMS.    THESE ARE NORMAL AFTER THE PROCEDURE.  THEY SHOULD GO AWAY AFTER A DAY OR TWO.  TO RELIEVE THESE PROBLEMS:     DRINK 6 TO 8 LARGE GLASSES OF WATER EACH DAY (INCLUDES DRINKS AT MEALS).  THIS WILL HELP CLEAR THE URINE.    TAKE WARM BATHS TO RELIEVE PAIN AND BLADDER SPASMS.  DO NOT ADD ANYTHING TO THE BATH WATER.    YOUR DOCTOR MAY PRESCRIBE PAIN MEDICINE.  YOU MAY ALSO TAKE TYLENOL (ACETAMINOPHEN) FOR PAIN.    CALL YOUR SURGEON IF YOU HAVE:    A FEVER OVER 101 DEGREES.  CHECK YOUR TEMPERATURE UNDER YOUR TONGUE.    CHILLS.    FAILURE TO URINATE (NO URINE COMES OUT WHEN YOU TRY TO USE THE TOILET).  TRY SOAKING IN A BATHTUB FULL OF WARM WATER.  IF STILL NO URINE, CALL YOUR DOCTOR.    A LOT OF BLOOD IN THE URINE, OR BLOOD CLOTS LARGER THAN A NICKEL.      PAIN IN THE BACK OR BELLY AREA (ABDOMEN).    PAIN OR SPASMS THAT ARE NOT RELIEVED BY WARM TUB BATHS AND PAIN MEDICINE.      SEVERE PAIN, BURNING OR OTHER PROBLEMS WHILE PASSING URINE.    PAIN THAT GETS WORSE AFTER TWO DAYS.          GENERAL ANESTHESIA OR SEDATION ADULT DISCHARGE INSTRUCTIONS   SPECIAL PRECAUTIONS FOR 24 HOURS AFTER SURGERY    IT IS NOT UNUSUAL TO FEEL LIGHT-HEADED OR FAINT, UP TO 24 HOURS AFTER SURGERY OR WHILE TAKING PAIN MEDICATION.  IF YOU HAVE THESE SYMPTOMS; SIT FOR A FEW MINUTES BEFORE STANDING AND HAVE SOMEONE ASSIST YOU WHEN YOU GET UP TO WALK OR USE THE BATHROOM.    YOU SHOULD REST AND RELAX FOR THE NEXT 24 HOURS AND YOU MUST MAKE ARRANGEMENTS TO HAVE SOMEONE  STAY WITH YOU FOR AT LEAST 24 HOURS AFTER YOUR DISCHARGE.  AVOID HAZARDOUS AND STRENUOUS ACTIVITIES.  DO NOT MAKE IMPORTANT DECISIONS FOR 24 HOURS.    DO NOT DRIVE ANY VEHICLE OR OPERATE MECHANICAL EQUIPMENT FOR 24 HOURS FOLLOWING THE END OF YOUR SURGERY.  EVEN THOUGH YOU MAY FEEL NORMAL, YOUR REACTIONS MAY BE AFFECTED BY THE MEDICATION YOU HAVE RECEIVED.    DO NOT DRINK ALCOHOLIC BEVERAGES FOR 24 HOURS FOLLOWING YOUR SURGERY.    DRINK CLEAR LIQUIDS (APPLE JUICE, GINGER ALE, 7-UP, BROTH, ETC.).  PROGRESS TO YOUR REGULAR DIET AS YOU FEEL ABLE.    YOU MAY HAVE A DRY MOUTH, A SORE THROAT, MUSCLES ACHES OR TROUBLE SLEEPING.  THESE SHOULD GO AWAY AFTER 24 HOURS.    CALL YOUR DOCTOR FOR ANY OF THE FOLLOWING:  SIGNS OF INFECTION (FEVER, GROWING TENDERNESS AT THE SURGERY SITE, A LARGE AMOUNT OF DRAINAGE OR BLEEDING, SEVERE PAIN, FOUL-SMELLING DRAINAGE, REDNESS OR SWELLING.    IT HAS BEEN OVER 8 TO 10 HOURS SINCE SURGERY AND YOU ARE STILL NOT ABLE TO URINATE (PASS WATER).     You received oxycodone at 9:10AM    You received Toradol, an IV form of ibuprofen (Motrin) at 7:30am.  Do not take any ibuprofen products until after 1:30pm if needed.    TRANSURETHRAL RESECTION OF PROSTATE (TURP) AND TRANSURETHRAL RESECTION OF BLADDER TUMOR (TURBT) DISCHARGE INSTRUCTIONS  [facility]  [address] [facility]   [phone]    Following a Transurethral Resection of Prostate or Transurethral Resection of Bladder Tumor, it will take six to eight weeks for your prostate and bladder areas to heal completely.  You may pass small blood clots or small pieces of tissue during this time, but this is not unusual.    For The First Six Weeks:   Always empty your bladder whenever you feel the urge to urinate.  Do not hold urine in your bladder.     Avoid heavy lifting, heavy physical exertion, long walks or long trips.  Short walks and stair climbing are permissible.    Do not strain to have a bowel movement.  If bowels become  constipated, take two tablespoons of Milk of Magnesia daily until movement is normal or soft.      Drink six glasses of liquid every day.  Include at least three glasses of water in this total amount.    Eat a well-balanced diet.  Follow the diet you were on before surgery.    Other Important Information:  Do not drive a car for two weeks after your surgery.  Use caution in winter driving and avoid driving related activities, such as tire changing or shoveling snow.    Do not engage in intercourse for at least four weeks or until your prostate or bladder has healed.  Check with your doctor at your first routine post-operative appointment to determine if your prostate has healed.    Take medications as prescribed by your doctor.    Call Your Doctor If You:  Are unable to pass urine.    Have painful voiding, loss of bladder control or increased frequency of urination.    Have chills or fever.    Pass large clots or your urine remains bloody during urination.  It is not unusual for urine to be blood tinged when starting the stream.    See your doctor for a post-operative checkup in six weeks.  Call ahead to the office for an appointment as soon as you are discharged.  The telephone number is                 [phone].  Any additional questions may be directed to your physician.      [facility]  [address]  [place]  [address] [phone]    [facility]  [address], [address]  [place]  [address] [phone]

## 2021-03-24 NOTE — ANESTHESIA POSTPROCEDURE EVALUATION
Patient: Chrystal Solis    Procedure(s):  Cystoscopy with transurethral resection prostate    Diagnosis:Elevated prostate specific antigen (PSA) [R97.20]  Diagnosis Additional Information: No value filed.    Anesthesia Type:  General    Note:  Disposition: Outpatient   Postop Pain Control: Uneventful            Sign Out: Well controlled pain   PONV: No   Neuro/Psych: Uneventful            Sign Out: Acceptable/Baseline neuro status   Airway/Respiratory: Uneventful            Sign Out: Acceptable/Baseline resp. status   CV/Hemodynamics: Uneventful            Sign Out: Acceptable CV status   Other NRE: NONE   DID A NON-ROUTINE EVENT OCCUR? No         Last vitals:  Vitals:    03/24/21 0840 03/24/21 0847 03/24/21 0850   BP:   124/83   Pulse:  65 65   Resp: 14 10 11   Temp:      SpO2:  96% 97%       Last vitals prior to Anesthesia Care Transfer:  CRNA VITALS  3/24/2021 0747 - 3/24/2021 0847      3/24/2021             Pulse:  90    SpO2:  98 %    Resp Rate (observed):  (!) 5          Electronically Signed By: Mike Joseph MD  March 24, 2021  8:57 AM

## 2021-03-24 NOTE — OP NOTE
Procedure Date: 03/24/2021      SURGEON:  Yosi Campbell MD      PREOPERATIVE DIAGNOSIS:  Tumor of the prostatic urethra.      POSTOPERATIVE DIAGNOSES:  Tumor of the prostatic urethra.      PROCEDURES PERFORMED:  Cystoscopy, transurethral resection of the prostate.      ANESTHESIA:  General.      COMPLICATIONS:  None.      INDICATIONS FOR PROCEDURE:  Chrystal Solis is a 70-year-old gentleman who presented with urinary retention.  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  He now presents for transurethral resection of the prostate in order to remove the tumor.      DETAILS OF THE PROCEDURE:  The risks and benefits of the procedure were explained in detail to the patient and informed consent was obtained.  The patient was brought to the operating room, placed supine on the table.  He underwent general endotracheal anesthetic.  He was then moved down to the dorsal lithotomy position where he was prepped and draped in standard sterile fashion.      The procedure began by using the visual obturator to introduce the 27-Kazakh Olympus resectoscope.  The tumor was immediately visible on the median lobe of the prostate.  I identified each ureteral orifice.  I looked throughout the rest of the bladder, and there were no other tumors identified.  The bladder was somewhat trabeculated.  I then resected out the tumor in its entirety and resected out some median lobe tissue of the prostate to the make certain I removed the base of the tumor.  The patient did not have a substantially enlarged median lobe, but he did have lateral lobe enlargement.  I fulgurated the resection bed for good hemostasis.  I flushed out all specimen through the scope.  I performed another cystoscopy to make certain all specimen was removed and made sure there was good hemostasis.  I removed the scope  and drained the bladder with the 20-Dominican coude-tip Frey catheter.  I filled the balloon with 20 mL of sterile water and irrigated the catheter.  Output was clear.  The procedure was concluded at this point.      The patient tolerated the procedure well without complications.  He went to the postanesthetic care in good condition.  He will go home from there.  He will remove his own catheter tomorrow morning at home.         ABDELRAHMAN FOREMAN MD             D: 2021   T: 2021   MT: ETHAN      Name:     HEATHER FAGAN   MRN:      [id number]        Account:        [id number]   :      1950           Procedure Date: 2021      Document: [id number]

## 2021-03-25 ENCOUNTER — TELEPHONE (OUTPATIENT)
Dept: UROLOGY | Facility: CLINIC | Age: 71
End: 2021-03-25

## 2021-03-25 NOTE — TELEPHONE ENCOUNTER
Health Call Center    Phone Message    May a detailed message be left on voicemail: yes     Reason for Call: Other: Chrystal calling to schedule his 6 week post op appointment with Dr. Campbell. Please give Chrystal a call to discuss scheduling options. Thanks!     Action Taken: Message routed to:  Other: Urologic physicians - scheduling pool    Travel Screening: Not Applicable

## 2021-03-26 LAB — COPATH REPORT: NORMAL

## 2021-06-26 DIAGNOSIS — E78.5 HYPERLIPIDEMIA LDL GOAL <130: ICD-10-CM

## 2021-06-26 DIAGNOSIS — I10 BENIGN ESSENTIAL HYPERTENSION: ICD-10-CM

## 2021-06-28 RX ORDER — ATORVASTATIN CALCIUM 10 MG/1
TABLET, FILM COATED ORAL
Qty: 90 TABLET | Refills: 0 | Status: SHIPPED | OUTPATIENT
Start: 2021-06-28 | End: 2022-02-03

## 2021-06-28 RX ORDER — IRBESARTAN 300 MG/1
TABLET ORAL
Qty: 90 TABLET | Refills: 2 | Status: SHIPPED | OUTPATIENT
Start: 2021-06-28 | End: 2022-02-03

## 2021-06-28 RX ORDER — AMLODIPINE BESYLATE 10 MG/1
TABLET ORAL
Qty: 90 TABLET | Refills: 2 | Status: SHIPPED | OUTPATIENT
Start: 2021-06-28 | End: 2022-02-03

## 2021-06-29 ENCOUNTER — OFFICE VISIT (OUTPATIENT)
Dept: UROLOGY | Facility: CLINIC | Age: 71
End: 2021-06-29
Payer: COMMERCIAL

## 2021-06-29 VITALS
HEIGHT: 72 IN | DIASTOLIC BLOOD PRESSURE: 80 MMHG | HEART RATE: 60 BPM | OXYGEN SATURATION: 96 % | SYSTOLIC BLOOD PRESSURE: 140 MMHG | BODY MASS INDEX: 31.02 KG/M2 | WEIGHT: 229 LBS

## 2021-06-29 DIAGNOSIS — Z85.51 PERSONAL HISTORY OF MALIGNANT NEOPLASM OF BLADDER: ICD-10-CM

## 2021-06-29 DIAGNOSIS — R97.20 ELEVATED PROSTATE SPECIFIC ANTIGEN (PSA): Primary | ICD-10-CM

## 2021-06-29 LAB
ALBUMIN UR-MCNC: NEGATIVE MG/DL
APPEARANCE UR: CLEAR
BILIRUB UR QL STRIP: NEGATIVE
COLOR UR AUTO: YELLOW
GLUCOSE UR STRIP-MCNC: NEGATIVE MG/DL
HGB UR QL STRIP: NEGATIVE
KETONES UR STRIP-MCNC: NEGATIVE MG/DL
LEUKOCYTE ESTERASE UR QL STRIP: NEGATIVE
NITRATE UR QL: NEGATIVE
PH UR STRIP: 7.5 PH (ref 5–7)
PSA SERPL-MCNC: 4.2 NG/ML (ref 0–4)
SOURCE: ABNORMAL
SP GR UR STRIP: 1.01 (ref 1–1.03)
UROBILINOGEN UR STRIP-ACNC: 0.2 EU/DL (ref 0.2–1)

## 2021-06-29 PROCEDURE — 81003 URINALYSIS AUTO W/O SCOPE: CPT | Mod: 59 | Performed by: UROLOGY

## 2021-06-29 PROCEDURE — 52000 CYSTOURETHROSCOPY: CPT | Performed by: UROLOGY

## 2021-06-29 PROCEDURE — 99213 OFFICE O/P EST LOW 20 MIN: CPT | Mod: 25 | Performed by: UROLOGY

## 2021-06-29 PROCEDURE — 84153 ASSAY OF PSA TOTAL: CPT | Performed by: UROLOGY

## 2021-06-29 PROCEDURE — 88112 CYTOPATH CELL ENHANCE TECH: CPT | Performed by: PATHOLOGY

## 2021-06-29 RX ORDER — LIDOCAINE HYDROCHLORIDE 20 MG/ML
JELLY TOPICAL ONCE
Status: DISCONTINUED | OUTPATIENT
Start: 2021-06-29 | End: 2021-06-29 | Stop reason: HOSPADM

## 2021-06-29 RX ORDER — CIPROFLOXACIN 500 MG/1
500 TABLET, FILM COATED ORAL ONCE
Qty: 1 TABLET | Refills: 0 | Status: SHIPPED | OUTPATIENT
Start: 2021-06-29 | End: 2021-06-29

## 2021-06-29 RX ORDER — CIPROFLOXACIN 500 MG/1
500 TABLET, FILM COATED ORAL ONCE
Qty: 1 TABLET | Refills: 0 | Status: CANCELLED | OUTPATIENT
Start: 2021-06-29 | End: 2021-06-29

## 2021-06-29 ASSESSMENT — PAIN SCALES - GENERAL: PAINLEVEL: NO PAIN (0)

## 2021-06-29 ASSESSMENT — MIFFLIN-ST. JEOR: SCORE: 1841.74

## 2021-06-29 NOTE — PATIENT INSTRUCTIONS

## 2021-06-29 NOTE — NURSING NOTE
Chief Complaint   Patient presents with     BTR     Here for in office cystoscopy     Prior to the start of the procedure and with procedural staff participation, I verbally confirmed the patient s identity using two indicators, relevant allergies, that the procedure was appropriate and matched the consent or emergent situation, and that the correct equipment/implants were available. Immediately prior to starting the procedure I conducted the Time Out with the procedural staff and re-confirmed the patient s name, procedure, and site/side. I have wiped the patient off with the povidone-Iodine solution, draped them,  used Lidocaine hydrochloride jelly, and instilled sterile water into the bladder. (The Joint Commission universal protocol was followed.)  Yes    Sedation (Moderate or Deep): Urojet    5mL 2% lidocaine hydrochloride Urojet instilled into urethra.    NDC# [address]-4  Lot #: [id number]  Expiration Date:  7-22    Martha Loyd

## 2021-06-29 NOTE — PROGRESS NOTES
Office Visit Note  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  Pathology showed an inverted papilloma.  He has felt well since that procedure in March.  He is here today for follow-up cystoscopy and to check his PSA as well. The PSA today is down somewhat at 4.2.      PAST MEDICAL HISTORY:   Past Medical History:   Diagnosis Date     Arthritis      Benign essential hypertension 1/11/2017     Bronchitis      Hearing loss, mixed, bilateral      Hernia, abdominal      Hyperlipidemia LDL goal <130 1/11/2017     Hypertension      Intervertebral cervical disc disorder with myelopathy, cervical region 2006    had injections     Malignant neoplasm (H) 12/6/10    squamous cell of tongue      Mumps      Numbness and tingling     numbness in toes     Penile discharge     urethritis, as a teenager     Peripheral polyneuropathy 1/11/2017     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       PAST SURGICAL HISTORY:   Past Surgical History:   Procedure Laterality Date     BIOPSY       COLONOSCOPY  3/2009     COLONOSCOPY N/A 3/8/2017    Procedure: COMBINED COLONOSCOPY, SINGLE OR MULTIPLE BIOPSY/POLYPECTOMY BY BIOPSY;  Surgeon: Yung Maguire MD;  Location:  GI     CYSTOSCOPY, TRANSURETHRAL RESECTION (TUR) PROSTATE, COMBINED N/A 3/24/2021    Procedure: Cystoscopy, transurethral resection of the prostate;  Surgeon: Yosi Campbell MD;  Location:  OR     ENT SURGERY       EXCISE LESION TRUNK N/A 2/3/2017    Procedure: EXCISE LESION TRUNK;  Surgeon: Jaswinder Lopez MD;  Location: [facility]     EXCISE LESION UPPER EXTREMITY Left 2/3/2017    Procedure: EXCISE LESION UPPER EXTREMITY;  Surgeon: John  Jaswinder Mckeon MD;  Location: [facility]     HEAD & NECK SURGERY       HERNIA REPAIR Left 2002     ORTHOPEDIC SURGERY  2002    torn ligament right shoulder     SOFT TISSUE SURGERY  12/6/10    excisional biopsy of tongue lesion     tonsillectomy  Age 6       FAMILY HISTORY:   Family History   Problem Relation Age of Onset     Cerebrovascular Disease Mother      Hypertension Mother      Respiratory Father         heavy smoker     Cancer Maternal Grandfather         lip cancer--heavy smoker     Unknown/Adopted Son      Unknown/Adopted Daughter        SOCIAL HISTORY:   Social History     Socioeconomic History     Marital status:      Spouse name: Not on file     Number of children: Not on file     Years of education: Not on file     Highest education level: Not on file   Occupational History     Not on file   Social Needs     Financial resource strain: Not on file     Food insecurity     Worry: Not on file     Inability: Not on file     Transportation needs     Medical: Not on file     Non-medical: Not on file   Tobacco Use     Smoking status: Former Smoker     Packs/day: 0.25     Years: 2.00     Pack years: 0.50     Types: Cigarettes     Quit date: 1978     Years since quittin.5     Smokeless tobacco: Never Used     Tobacco comment: smoked socially in college   Substance and Sexual Activity     Alcohol use: Not Currently     Alcohol/week: 0.0 standard drinks     Drug use: No     Sexual activity: Yes     Partners: Female   Lifestyle     Physical activity     Days per week: Not on file     Minutes per session: Not on file     Stress: Not on file   Relationships     Social connections     Talks on phone: Not on file     Gets together: Not on file     Attends Church service: Not on file     Active member of club or organization: Not on file     Attends meetings of clubs or organizations: Not on file     Relationship status: Not on file     Intimate partner violence     Fear of current or ex partner: Not on  file     Emotionally abused: Not on file     Physically abused: Not on file     Forced sexual activity: Not on file   Other Topics Concern     Parent/sibling w/ CABG, MI or angioplasty before 65F 55M? No   Social History Narrative     Not on file       Review Of Systems:  Skin: No rash, pruritis, or skin pigmentation  Eyes: No changes in vision  Ears/Nose/Throat: No changes in hearing, no nosebleeds  Respiratory: No shortness of breath, dyspnea on exertion, cough, or hemoptysis  Cardiovascular: No chest pain or palpitations  Gastrointestinal: No diarrhea or constipation. No abdominal pain. No hematochezia  Genitourinary: see HPI  Musculoskeletal: No pain or swelling of joints, normal range of motion  Neurologic: No weakness or tremors  Psychiatric: No recent changes in memory or mood  Hematologic/Lymphatic/Immunologic: No easy bruising or enlarged lymph nodes  Endocrine: No weight gain or loss      PHYSICAL EXAM:    There were no vitals taken for this visit.    Constitutional: Well developed. Conversant and in no acute distress  Eyes: Anicteric sclera, conjunctiva clear, normal extraocular movements  ENT: Normocephalic and atraumatic,   Skin: Warm and dry. No rashes or lesions  Cardiac: No peripheral edema  Back/Flank: Not done  CNS/PNS: Normal musculature and movements, moves all extremities normally  Respiratory: Normal non-labored breathing  Abdomen: Soft nontender and nondistended  Peripheral Vascular: No peripheral edema  Mental Status/Psych: Alert and Oriented x 3. Normal mood and affect    Penis: Normal  Scrotal skin: Normal, no lesions  Testicles: Normal to palpation bilaterally  Epididymis: Normal to palpation bilaterally  Lymphatic: Normal inguinal lymph nodes    Digital Rectal Exam:     Cystoscopy: I performed flexible cystoscopy and the bladder was normal throughout. No tumors identified throughout the bladder.    Imaging: None    Urinalysis: UA RESULTS:  Recent Labs   Lab Test 03/02/21  1007  09/25/20  1538 09/25/20  1538   COLOR Yellow   < > Yellow   APPEARANCE Clear   < > Clear   URINEGLC Negative   < > Negative   URINEBILI Negative   < > Small*   URINEKETONE Negative   < > Trace*   SG 1.015   < > 1.020   UBLD Moderate*   < > Moderate*   URINEPH 6.5   < > 6.0   PROTEIN Negative   < > 100*   UROBILINOGEN 0.2   < > 2.0*   NITRITE Negative   < > Negative   LEUKEST Negative   < > Small*   RBCU  --   --  5-10*   WBCU  --   --  25-50*    < > = values in this interval not displayed.       PSA: 4.2    Post Void Residual:     Other labs: None today      IMPRESSION:  Elevated PSA    PLAN:  His cystoscopy is normal today. I did recommend that in 1 to 2 years we will repeat another cystoscopy just because his inverted papilloma could potentially do note increased risk to develop bladder cancer. We discussed the PSA. The PSA continues to improve but it is still slightly elevated. I recommended we continue to follow the PSA fairly closely. I will see him back in 6 months for another PSA check.      Yosi Campbell M.D.

## 2021-06-29 NOTE — LETTER
6/29/2021       RE: Chrystal Solis  [address] [place] [address]     Dear Colleague,    Thank you for referring your patient, Chrystal Solis, to the [facility]. Please see a copy of my visit note below.    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  Pathology showed an inverted papilloma.  He has felt well since that procedure in March.  He is here today for follow-up cystoscopy and to check his PSA as well. The PSA today is down somewhat at 4.2.      PAST MEDICAL HISTORY:   Past Medical History:   Diagnosis Date     Arthritis      Benign essential hypertension 1/11/2017     Bronchitis      Hearing loss, mixed, bilateral      Hernia, abdominal      Hyperlipidemia LDL goal <130 1/11/2017     Hypertension      Intervertebral cervical disc disorder with myelopathy, cervical region 2006    had injections     Malignant neoplasm (H) 12/6/10    squamous cell of tongue      Mumps      Numbness and tingling     numbness in toes     Penile discharge     urethritis, as a teenager     Peripheral polyneuropathy 1/11/2017     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       PAST SURGICAL HISTORY:   Past Surgical History:   Procedure Laterality Date     BIOPSY       COLONOSCOPY  3/2009     COLONOSCOPY N/A 3/8/2017    Procedure: COMBINED COLONOSCOPY, SINGLE OR MULTIPLE BIOPSY/POLYPECTOMY BY BIOPSY;  Surgeon: Yung Maguire MD;  Location:  GI     CYSTOSCOPY, TRANSURETHRAL RESECTION (TUR) PROSTATE, COMBINED N/A 3/24/2021    Procedure: Cystoscopy, transurethral resection of the prostate;   Surgeon: Yosi Campbell MD;  Location: [facility] OR     ENT SURGERY       EXCISE LESION TRUNK N/A 2/3/2017    Procedure: EXCISE LESION TRUNK;  Surgeon: Jaswinder Lopez MD;  Location: [facility]     EXCISE LESION UPPER EXTREMITY Left 2/3/2017    Procedure: EXCISE LESION UPPER EXTREMITY;  Surgeon: Jaswinder Lopez MD;  Location: [facility]     HEAD & NECK SURGERY       HERNIA REPAIR Left      ORTHOPEDIC SURGERY  2002    torn ligament right shoulder     SOFT TISSUE SURGERY  12/6/10    excisional biopsy of tongue lesion     tonsillectomy  Age 6       FAMILY HISTORY:   Family History   Problem Relation Age of Onset     Cerebrovascular Disease Mother      Hypertension Mother      Respiratory Father         heavy smoker     Cancer Maternal Grandfather         lip cancer--heavy smoker     Unknown/Adopted Son      Unknown/Adopted Daughter        SOCIAL HISTORY:   Social History     Socioeconomic History     Marital status:      Spouse name: Not on file     Number of children: Not on file     Years of education: Not on file     Highest education level: Not on file   Occupational History     Not on file   Social Needs     Financial resource strain: Not on file     Food insecurity     Worry: Not on file     Inability: Not on file     Transportation needs     Medical: Not on file     Non-medical: Not on file   Tobacco Use     Smoking status: Former Smoker     Packs/day: 0.25     Years: 2.00     Pack years: 0.50     Types: Cigarettes     Quit date: 1978     Years since quittin.5     Smokeless tobacco: Never Used     Tobacco comment: smoked socially in college   Substance and Sexual Activity     Alcohol use: Not Currently     Alcohol/week: 0.0 standard drinks     Drug use: No     Sexual activity: Yes     Partners: Female   Lifestyle     Physical activity     Days per week: Not on file     Minutes per session: Not on file     Stress: Not on file   Relationships     Social connections     Talks on phone:  Not on file     Gets together: Not on file     Attends Congregational service: Not on file     Active member of club or organization: Not on file     Attends meetings of clubs or organizations: Not on file     Relationship status: Not on file     Intimate partner violence     Fear of current or ex partner: Not on file     Emotionally abused: Not on file     Physically abused: Not on file     Forced sexual activity: Not on file   Other Topics Concern     Parent/sibling w/ CABG, MI or angioplasty before 65F 55M? No   Social History Narrative     Not on file       Review Of Systems:  Skin: No rash, pruritis, or skin pigmentation  Eyes: No changes in vision  Ears/Nose/Throat: No changes in hearing, no nosebleeds  Respiratory: No shortness of breath, dyspnea on exertion, cough, or hemoptysis  Cardiovascular: No chest pain or palpitations  Gastrointestinal: No diarrhea or constipation. No abdominal pain. No hematochezia  Genitourinary: see HPI  Musculoskeletal: No pain or swelling of joints, normal range of motion  Neurologic: No weakness or tremors  Psychiatric: No recent changes in memory or mood  Hematologic/Lymphatic/Immunologic: No easy bruising or enlarged lymph nodes  Endocrine: No weight gain or loss      PHYSICAL EXAM:    There were no vitals taken for this visit.    Constitutional: Well developed. Conversant and in no acute distress  Eyes: Anicteric sclera, conjunctiva clear, normal extraocular movements  ENT: Normocephalic and atraumatic,   Skin: Warm and dry. No rashes or lesions  Cardiac: No peripheral edema  Back/Flank: Not done  CNS/PNS: Normal musculature and movements, moves all extremities normally  Respiratory: Normal non-labored breathing  Abdomen: Soft nontender and nondistended  Peripheral Vascular: No peripheral edema  Mental Status/Psych: Alert and Oriented x 3. Normal mood and affect    Penis: Normal  Scrotal skin: Normal, no lesions  Testicles: Normal to palpation bilaterally  Epididymis: Normal to  palpation bilaterally  Lymphatic: Normal inguinal lymph nodes    Digital Rectal Exam:     Cystoscopy: I performed flexible cystoscopy and the bladder was normal throughout. No tumors identified throughout the bladder.    Imaging: None    Urinalysis: UA RESULTS:  Recent Labs   Lab Test 03/02/21  1007 09/25/20  1538 09/25/20  1538   COLOR Yellow   < > Yellow   APPEARANCE Clear   < > Clear   URINEGLC Negative   < > Negative   URINEBILI Negative   < > Small*   URINEKETONE Negative   < > Trace*   SG 1.015   < > 1.020   UBLD Moderate*   < > Moderate*   URINEPH 6.5   < > 6.0   PROTEIN Negative   < > 100*   UROBILINOGEN 0.2   < > 2.0*   NITRITE Negative   < > Negative   LEUKEST Negative   < > Small*   RBCU  --   --  5-10*   WBCU  --   --  25-50*    < > = values in this interval not displayed.       PSA: 4.2    Post Void Residual:     Other labs: None today      IMPRESSION:  Elevated PSA    PLAN:  His cystoscopy is normal today. I did recommend that in 1 to 2 years we will repeat another cystoscopy just because his inverted papilloma could potentially do note increased risk to develop bladder cancer. We discussed the PSA. The PSA continues to improve but it is still slightly elevated. I recommended we continue to follow the PSA fairly closely. I will see him back in 6 months for another PSA check.      Yosi Campbell M.D.

## 2021-07-01 LAB — COPATH REPORT: NORMAL

## 2021-09-05 ENCOUNTER — HEALTH MAINTENANCE LETTER (OUTPATIENT)
Age: 71
End: 2021-09-05

## 2021-09-17 RX ORDER — KETOCONAZOLE 20 MG/G
CREAM TOPICAL
Qty: 60 G | Refills: 1
Start: 2021-09-17

## 2021-09-20 DIAGNOSIS — H01.00B BLEPHARITIS OF UPPER AND LOWER EYELIDS OF BOTH EYES, UNSPECIFIED TYPE: Primary | ICD-10-CM

## 2021-09-20 DIAGNOSIS — H01.00A BLEPHARITIS OF UPPER AND LOWER EYELIDS OF BOTH EYES, UNSPECIFIED TYPE: Primary | ICD-10-CM

## 2021-09-20 RX ORDER — KETOCONAZOLE 20 MG/G
CREAM TOPICAL
Qty: 60 G | Refills: 1 | OUTPATIENT
Start: 2021-09-20

## 2021-09-22 NOTE — TELEPHONE ENCOUNTER
Patient insists Dr. Rodríguez is MD to refill this.  States to use for 4 weeks.    Has been refused refill X 2.    Triage please call him and ask him what he needs this for and if this is a chronic condition.(offsite)    Thank you,    Blanca Jean RN  [facility]

## 2021-09-27 NOTE — TELEPHONE ENCOUNTER
"Left message on answering machine for patient to call back.    Please forward to Ruma RN Triage \"green calls\" line (Delonte DOSHI RN) at [phone] if pt has further questions that need to be addressed by an RN.       Kristin GUAJARDO RN,BSN        "

## 2021-09-29 NOTE — TELEPHONE ENCOUNTER
This is not on med list, current or historical     Sent [employer] message to pt to verify request     Caron GRANT RN

## 2021-09-29 NOTE — TELEPHONE ENCOUNTER
Response message from patient:     Please advise      RE:Ketoconazole  9/29/2021 1:09 PM Reply    To:  [facility] TRIAGE      From: Chrystal DURAN Will      Created: 9/29/2021 1:09 PM        *-*-*This message has not been handled.*-*-*    Am thinking that [facility] must have sent my request to Dr. Rodríguez as the vast majority of  scripts for antihypertensive medications were written by your clinic.  The original script was written by  Dr. Jaswinder Davis of [facility] in 2018.  I was treated for a basal cell carcinoma on my  nose.  I asked Dr. Davis, during a follow-up appt. , if he could prescribe something for a nasty intermittent  case of athletes foot that flairs up from time to time.  He prescribed a tube of Ketoconazole, in ointment   form, for flair ups.   I have used that intermittently for that purpose.  I have a flair up and my script had been  used up.  If Dr. Rodríguez feels comfortable writting the script for me.....that's fine for the sake of expedience.  If not, I will have to  have the pharmacy contact the original provider.   The pharmacy should have sent the  request to [facility].  I don't know if Dr. Davis has retired or not.  BTW.........when should I be seeing Dr. Rodríguez again for a routine annual physical?   I had a pre-surgical   physical back in late Feb. or early March.   The surgery was preformed by urologist Dr. Yosi Paula  for a prostate/bladder issue.  I had a follow-up with Chai in late June.  There is another appt. scheduled   for a big work-up in late Jan. 2021...........another question......I have had both phizer vaccines.....the second  one administered on March 29.  I intend on getting the booster.   1.  When should I be getting this booster?  2.  When should I be getting the routine influenza vaccine for folks over 65.  Should I get the flu shot and  the covid booster at the same time?   Should I get one vaccine before the other....Please  advise!  Thanks!!!  Chrystal Solis  [phone]  anytime cell

## 2021-09-30 RX ORDER — KETOCONAZOLE 20 MG/G
CREAM TOPICAL
Qty: 60 G | Refills: 1 | Status: SHIPPED | OUTPATIENT
Start: 2021-09-30 | End: 2023-06-28

## 2021-10-13 DIAGNOSIS — I10 BENIGN ESSENTIAL HYPERTENSION: ICD-10-CM

## 2021-10-15 RX ORDER — HYDROCHLOROTHIAZIDE 25 MG/1
TABLET ORAL
Qty: 90 TABLET | Refills: 3 | OUTPATIENT
Start: 2021-10-15

## 2021-10-15 RX ORDER — HYDROCHLOROTHIAZIDE 25 MG/1
TABLET ORAL
Qty: 90 TABLET | Refills: 3 | Status: SHIPPED | OUTPATIENT
Start: 2021-10-15 | End: 2022-02-03

## 2021-10-15 NOTE — TELEPHONE ENCOUNTER
Routing refill request to provider for review/approval because:  Labs not current:      BP Readings from Last 3 Encounters:   06/29/21 (!) 140/80   03/24/21 131/76   03/10/21 121/77     Creatinine   Date Value Ref Range Status   03/24/2021 0.80 0.66 - 1.25 mg/dL Final        Sodium 133 - 144 mmol/L 137

## 2021-12-26 ENCOUNTER — HEALTH MAINTENANCE LETTER (OUTPATIENT)
Age: 71
End: 2021-12-26

## 2021-12-27 ENCOUNTER — MYC MEDICAL ADVICE (OUTPATIENT)
Dept: FAMILY MEDICINE | Facility: CLINIC | Age: 71
End: 2021-12-27
Payer: COMMERCIAL

## 2021-12-28 NOTE — TELEPHONE ENCOUNTER
Dr. Rodríguez, please answer the first question and send route back to [place].  I guess I am not sure what exactly is done at the wellness visit so want to make sure before I respond.     Updated immunizations.  Colonoscopy due 3/2022    Esha Dias MA

## 2022-01-25 ENCOUNTER — OFFICE VISIT (OUTPATIENT)
Dept: UROLOGY | Facility: CLINIC | Age: 72
End: 2022-01-25
Payer: COMMERCIAL

## 2022-01-25 VITALS
BODY MASS INDEX: 31.97 KG/M2 | DIASTOLIC BLOOD PRESSURE: 80 MMHG | WEIGHT: 236 LBS | HEIGHT: 72 IN | SYSTOLIC BLOOD PRESSURE: 130 MMHG

## 2022-01-25 DIAGNOSIS — R97.20 ELEVATED PROSTATE SPECIFIC ANTIGEN (PSA): Primary | ICD-10-CM

## 2022-01-25 DIAGNOSIS — R68.82 LOW LIBIDO: ICD-10-CM

## 2022-01-25 LAB — PSA SERPL-MCNC: 3.3 UG/L (ref 0–4)

## 2022-01-25 PROCEDURE — 36415 COLL VENOUS BLD VENIPUNCTURE: CPT | Performed by: UROLOGY

## 2022-01-25 PROCEDURE — 99214 OFFICE O/P EST MOD 30 MIN: CPT | Performed by: UROLOGY

## 2022-01-25 PROCEDURE — 84153 ASSAY OF PSA TOTAL: CPT | Performed by: UROLOGY

## 2022-01-25 ASSESSMENT — MIFFLIN-ST. JEOR: SCORE: 1868.49

## 2022-01-25 ASSESSMENT — PAIN SCALES - GENERAL: PAINLEVEL: NO PAIN (0)

## 2022-01-25 NOTE — LETTER
1/25/2022       RE: Chrystal Solis  [address] [place] [address]     Dear Colleague,    Thank you for referring your patient, Chrystal Solis, to the [facility] at [facility]. Please see a copy of my visit note below.    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  His PSA is now in the normal range at 3.3.  He has no urinary symptoms or complaints at this time.    He does report from libido he asked about having a testosterone level checked.      PAST MEDICAL HISTORY:   Past Medical History:   Diagnosis Date     Arthritis      Benign essential hypertension 1/11/2017     Bronchitis      Hearing loss, mixed, bilateral      Hernia, abdominal      Hyperlipidemia LDL goal <130 1/11/2017     Hypertension      Intervertebral cervical disc disorder with myelopathy, cervical region 2006    had injections     Malignant neoplasm (H) 12/6/10    squamous cell of tongue      Mumps      Numbness and tingling     numbness in toes     Penile discharge     urethritis, as a teenager     Peripheral polyneuropathy 1/11/2017     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       PAST SURGICAL HISTORY:   Past Surgical History:   Procedure Laterality Date     BIOPSY       COLONOSCOPY  3/2009     COLONOSCOPY N/A 3/8/2017    Procedure: COMBINED COLONOSCOPY, SINGLE OR MULTIPLE BIOPSY/POLYPECTOMY BY BIOPSY;  Surgeon: Yung Maguire MD;  Location:  GI     CYSTOSCOPY, TRANSURETHRAL RESECTION (TUR) PROSTATE, COMBINED N/A 3/24/2021    Procedure: Cystoscopy, transurethral resection of the prostate;  Surgeon: Yosi Campbell MD;  Location:  OR     ENT  SURGERY       EXCISE LESION TRUNK N/A 2/3/2017    Procedure: EXCISE LESION TRUNK;  Surgeon: Jaswinder Lopez MD;  Location:  [facility]     EXCISE LESION UPPER EXTREMITY Left 2/3/2017    Procedure: EXCISE LESION UPPER EXTREMITY;  Surgeon: Jaswinder Lopez MD;  Location: [facility]     HEAD & NECK SURGERY       HERNIA REPAIR Left      ORTHOPEDIC SURGERY  2002    torn ligament right shoulder     SOFT TISSUE SURGERY  12/6/10    excisional biopsy of tongue lesion     tonsillectomy  Age 6       FAMILY HISTORY:   Family History   Problem Relation Age of Onset     Cerebrovascular Disease Mother      Hypertension Mother      Respiratory Father         heavy smoker     Cancer Maternal Grandfather         lip cancer--heavy smoker     Unknown/Adopted Son      Unknown/Adopted Daughter        SOCIAL HISTORY:   Social History     Socioeconomic History     Marital status:      Spouse name: None     Number of children: None     Years of education: None     Highest education level: None   Occupational History     None   Tobacco Use     Smoking status: Former Smoker     Packs/day: 0.25     Years: 2.00     Pack years: 0.50     Types: Cigarettes     Quit date: 1978     Years since quittin.0     Smokeless tobacco: Never Used     Tobacco comment: smoked socially in college   Substance and Sexual Activity     Alcohol use: Not Currently     Alcohol/week: 0.0 standard drinks     Drug use: No     Sexual activity: Yes     Partners: Female   Other Topics Concern     Parent/sibling w/ CABG, MI or angioplasty before 65F 55M? No   Social History Narrative     None     Social Determinants of Health     Financial Resource Strain: Not on file   Food Insecurity: Not on file   Transportation Needs: Not on file   Physical Activity: Not on file   Stress: Not on file   Social Connections: Not on file   Intimate Partner Violence: Not on file   Housing Stability: Not on file       Review Of Systems:  Skin: No rash, pruritis, or skin  pigmentation  Eyes: No changes in vision  Ears/Nose/Throat: No changes in hearing, no nosebleeds  Respiratory: No shortness of breath, dyspnea on exertion, cough, or hemoptysis  Cardiovascular: No chest pain or palpitations  Gastrointestinal: No diarrhea or constipation. No abdominal pain. No hematochezia  Genitourinary: see HPI  Musculoskeletal: No pain or swelling of joints, normal range of motion  Neurologic: No weakness or tremors  Psychiatric: No recent changes in memory or mood  Hematologic/Lymphatic/Immunologic: No easy bruising or enlarged lymph nodes  Endocrine: No weight gain or loss      PHYSICAL EXAM:    /80   Ht 1.829 m (6')   Wt 107 kg (236 lb)   BMI 32.01 kg/m      Constitutional: Well developed. Conversant and in no acute distress  Eyes: Anicteric sclera, conjunctiva clear, normal extraocular movements  ENT: Normocephalic and atraumatic,   Skin: Warm and dry. No rashes or lesions  Cardiac: No peripheral edema  Back/Flank: Not done  CNS/PNS: Normal musculature and movements, moves all extremities normally  Respiratory: Normal non-labored breathing  Abdomen: Soft nontender and nondistended  Peripheral Vascular: No peripheral edema  Mental Status/Psych: Alert and Oriented x 3. Normal mood and affect    Penis: Not done  Scrotal Skin: Not done  Testicles: Not done  Epididymis: Not done  Digital Rectal Exam:     Cystoscopy: Not done    Imaging: None    Urinalysis: UA RESULTS:  Recent Labs   Lab Test 06/29/21  0836 10/21/20  0956 09/25/20  1538   COLOR Yellow   < > Yellow   APPEARANCE Clear   < > Clear   URINEGLC Negative   < > Negative   URINEBILI Negative   < > Small*   URINEKETONE Negative   < > Trace*   SG 1.015   < > 1.020   UBLD Negative   < > Moderate*   URINEPH 7.5*   < > 6.0   PROTEIN Negative   < > 100*   UROBILINOGEN 0.2   < > 2.0*   NITRITE Negative   < > Negative   LEUKEST Negative   < > Small*   RBCU  --   --  5-10*   WBCU  --   --  25-50*    < > = values in this interval not  displayed.       PSA: 3.3    Post Void Residual:     Other labs: None today      IMPRESSION:  PSA normal    PLAN:  His PSA is back in the normal range.  He was provided reassurance.  In 1 year I recommended I see him back for another PSA and also for another screening cystoscopy.    He did asked me about checking a serum testosterone today.  The new recommendations are that serum testosterone should always be checked while fasting and first thing in the morning.  He has eaten breakfast so we certainly would not check it today.  He asked me about having it checked when he is doing his fasting labs with Dr. Rodríguez next week.  We discussed the pros and cons of checking the testosterone.  We did discuss that there are potentially risks to taking testosterone supplementation should the levels be low.  He wishes to have the testosterone checked so I will add it to his labs for next week.  If the testosterone does happen to be low and he does wish to potentially undergo supplementation, I would recommend he seek consultation with an endocrinologist.      Yosi Campbell M.D.

## 2022-01-25 NOTE — PROGRESS NOTES
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  His PSA is now in the normal range at 3.3.  He has no urinary symptoms or complaints at this time.    He does report from libido he asked about having a testosterone level checked.      PAST MEDICAL HISTORY:   Past Medical History:   Diagnosis Date     Arthritis      Benign essential hypertension 1/11/2017     Bronchitis      Hearing loss, mixed, bilateral      Hernia, abdominal      Hyperlipidemia LDL goal <130 1/11/2017     Hypertension      Intervertebral cervical disc disorder with myelopathy, cervical region 2006    had injections     Malignant neoplasm (H) 12/6/10    squamous cell of tongue      Mumps      Numbness and tingling     numbness in toes     Penile discharge     urethritis, as a teenager     Peripheral polyneuropathy 1/11/2017     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       PAST SURGICAL HISTORY:   Past Surgical History:   Procedure Laterality Date     BIOPSY       COLONOSCOPY  3/2009     COLONOSCOPY N/A 3/8/2017    Procedure: COMBINED COLONOSCOPY, SINGLE OR MULTIPLE BIOPSY/POLYPECTOMY BY BIOPSY;  Surgeon: Yung Maguire MD;  Location:  GI     CYSTOSCOPY, TRANSURETHRAL RESECTION (TUR) PROSTATE, COMBINED N/A 3/24/2021    Procedure: Cystoscopy, transurethral resection of the prostate;  Surgeon: Yosi Campbell MD;  Location:  OR     ENT SURGERY       EXCISE LESION TRUNK N/A 2/3/2017    Procedure: EXCISE LESION TRUNK;  Surgeon: Jaswinder Lopez MD;  Location: [facility]     EXCISE LESION UPPER EXTREMITY Left 2/3/2017    Procedure: EXCISE LESION UPPER EXTREMITY;  Surgeon: Jaswinder Lopez MD;  Location: [facility]     HEAD & NECK SURGERY        HERNIA REPAIR Left 2002     ORTHOPEDIC SURGERY  2002    torn ligament right shoulder     SOFT TISSUE SURGERY  12/6/10    excisional biopsy of tongue lesion     tonsillectomy  Age 6       FAMILY HISTORY:   Family History   Problem Relation Age of Onset     Cerebrovascular Disease Mother      Hypertension Mother      Respiratory Father         heavy smoker     Cancer Maternal Grandfather         lip cancer--heavy smoker     Unknown/Adopted Son      Unknown/Adopted Daughter        SOCIAL HISTORY:   Social History     Socioeconomic History     Marital status:      Spouse name: None     Number of children: None     Years of education: None     Highest education level: None   Occupational History     None   Tobacco Use     Smoking status: Former Smoker     Packs/day: 0.25     Years: 2.00     Pack years: 0.50     Types: Cigarettes     Quit date: 1978     Years since quittin.0     Smokeless tobacco: Never Used     Tobacco comment: smoked socially in college   Substance and Sexual Activity     Alcohol use: Not Currently     Alcohol/week: 0.0 standard drinks     Drug use: No     Sexual activity: Yes     Partners: Female   Other Topics Concern     Parent/sibling w/ CABG, MI or angioplasty before 65F 55M? No   Social History Narrative     None     Social Determinants of Health     Financial Resource Strain: Not on file   Food Insecurity: Not on file   Transportation Needs: Not on file   Physical Activity: Not on file   Stress: Not on file   Social Connections: Not on file   Intimate Partner Violence: Not on file   Housing Stability: Not on file       Review Of Systems:  Skin: No rash, pruritis, or skin pigmentation  Eyes: No changes in vision  Ears/Nose/Throat: No changes in hearing, no nosebleeds  Respiratory: No shortness of breath, dyspnea on exertion, cough, or hemoptysis  Cardiovascular: No chest pain or palpitations  Gastrointestinal: No diarrhea or constipation. No abdominal pain. No  hematochezia  Genitourinary: see HPI  Musculoskeletal: No pain or swelling of joints, normal range of motion  Neurologic: No weakness or tremors  Psychiatric: No recent changes in memory or mood  Hematologic/Lymphatic/Immunologic: No easy bruising or enlarged lymph nodes  Endocrine: No weight gain or loss      PHYSICAL EXAM:    /80   Ht 1.829 m (6')   Wt 107 kg (236 lb)   BMI 32.01 kg/m      Constitutional: Well developed. Conversant and in no acute distress  Eyes: Anicteric sclera, conjunctiva clear, normal extraocular movements  ENT: Normocephalic and atraumatic,   Skin: Warm and dry. No rashes or lesions  Cardiac: No peripheral edema  Back/Flank: Not done  CNS/PNS: Normal musculature and movements, moves all extremities normally  Respiratory: Normal non-labored breathing  Abdomen: Soft nontender and nondistended  Peripheral Vascular: No peripheral edema  Mental Status/Psych: Alert and Oriented x 3. Normal mood and affect    Penis: Not done  Scrotal Skin: Not done  Testicles: Not done  Epididymis: Not done  Digital Rectal Exam:     Cystoscopy: Not done    Imaging: None    Urinalysis: UA RESULTS:  Recent Labs   Lab Test 06/29/21  0836 10/21/20  0956 09/25/20  1538   COLOR Yellow   < > Yellow   APPEARANCE Clear   < > Clear   URINEGLC Negative   < > Negative   URINEBILI Negative   < > Small*   URINEKETONE Negative   < > Trace*   SG 1.015   < > 1.020   UBLD Negative   < > Moderate*   URINEPH 7.5*   < > 6.0   PROTEIN Negative   < > 100*   UROBILINOGEN 0.2   < > 2.0*   NITRITE Negative   < > Negative   LEUKEST Negative   < > Small*   RBCU  --   --  5-10*   WBCU  --   --  25-50*    < > = values in this interval not displayed.       PSA: 3.3    Post Void Residual:     Other labs: None today      IMPRESSION:  PSA normal    PLAN:  His PSA is back in the normal range.  He was provided reassurance.  In 1 year I recommended I see him back for another PSA and also for another screening cystoscopy.    He did asked me  about checking a serum testosterone today.  The new recommendations are that serum testosterone should always be checked while fasting and first thing in the morning.  He has eaten breakfast so we certainly would not check it today.  He asked me about having it checked when he is doing his fasting labs with Dr. Rodríguez next week.  We discussed the pros and cons of checking the testosterone.  We did discuss that there are potentially risks to taking testosterone supplementation should the levels be low.  He wishes to have the testosterone checked so I will add it to his labs for next week.  If the testosterone does happen to be low and he does wish to potentially undergo supplementation, I would recommend he seek consultation with an endocrinologist.      Yosi Campbell M.D.

## 2022-01-26 PROCEDURE — 84403 ASSAY OF TOTAL TESTOSTERONE: CPT | Performed by: UROLOGY

## 2022-01-26 PROCEDURE — 36415 COLL VENOUS BLD VENIPUNCTURE: CPT | Performed by: UROLOGY

## 2022-02-01 LAB — TESTOST SERPL-MCNC: 340 NG/DL (ref 240–950)

## 2022-02-03 ENCOUNTER — OFFICE VISIT (OUTPATIENT)
Dept: FAMILY MEDICINE | Facility: CLINIC | Age: 72
End: 2022-02-03
Payer: COMMERCIAL

## 2022-02-03 VITALS
HEIGHT: 72 IN | HEART RATE: 59 BPM | DIASTOLIC BLOOD PRESSURE: 75 MMHG | RESPIRATION RATE: 16 BRPM | BODY MASS INDEX: 32.74 KG/M2 | TEMPERATURE: 96.8 F | WEIGHT: 241.7 LBS | OXYGEN SATURATION: 96 % | SYSTOLIC BLOOD PRESSURE: 125 MMHG

## 2022-02-03 DIAGNOSIS — Z12.11 SCREEN FOR COLON CANCER: ICD-10-CM

## 2022-02-03 DIAGNOSIS — H04.203 EXCESSIVE TEAR PRODUCTION OF BOTH LACRIMAL GLANDS: ICD-10-CM

## 2022-02-03 DIAGNOSIS — C02.9 SQUAMOUS CELL CARCINOMA OF TONGUE (H): ICD-10-CM

## 2022-02-03 DIAGNOSIS — I10 BENIGN ESSENTIAL HYPERTENSION: ICD-10-CM

## 2022-02-03 DIAGNOSIS — G62.9 PERIPHERAL POLYNEUROPATHY: ICD-10-CM

## 2022-02-03 DIAGNOSIS — R97.20 ELEVATED PROSTATE SPECIFIC ANTIGEN (PSA): ICD-10-CM

## 2022-02-03 DIAGNOSIS — E78.5 HYPERLIPIDEMIA LDL GOAL <100: ICD-10-CM

## 2022-02-03 DIAGNOSIS — Z00.00 ROUTINE GENERAL MEDICAL EXAMINATION AT A HEALTH CARE FACILITY: Primary | ICD-10-CM

## 2022-02-03 LAB
ERYTHROCYTE [DISTWIDTH] IN BLOOD BY AUTOMATED COUNT: 13.6 % (ref 10–15)
HCT VFR BLD AUTO: 50.1 % (ref 40–53)
HGB BLD-MCNC: 17.3 G/DL (ref 13.3–17.7)
MCH RBC QN AUTO: 30.3 PG (ref 26.5–33)
MCHC RBC AUTO-ENTMCNC: 34.5 G/DL (ref 31.5–36.5)
MCV RBC AUTO: 88 FL (ref 78–100)
PLATELET # BLD AUTO: 207 10E3/UL (ref 150–450)
RBC # BLD AUTO: 5.71 10E6/UL (ref 4.4–5.9)
WBC # BLD AUTO: 7.2 10E3/UL (ref 4–11)

## 2022-02-03 PROCEDURE — 36415 COLL VENOUS BLD VENIPUNCTURE: CPT | Performed by: INTERNAL MEDICINE

## 2022-02-03 PROCEDURE — 99397 PER PM REEVAL EST PAT 65+ YR: CPT | Performed by: INTERNAL MEDICINE

## 2022-02-03 PROCEDURE — 84443 ASSAY THYROID STIM HORMONE: CPT | Performed by: INTERNAL MEDICINE

## 2022-02-03 PROCEDURE — 99213 OFFICE O/P EST LOW 20 MIN: CPT | Mod: 25 | Performed by: INTERNAL MEDICINE

## 2022-02-03 PROCEDURE — 80061 LIPID PANEL: CPT | Performed by: INTERNAL MEDICINE

## 2022-02-03 PROCEDURE — 85027 COMPLETE CBC AUTOMATED: CPT | Performed by: INTERNAL MEDICINE

## 2022-02-03 PROCEDURE — 80053 COMPREHEN METABOLIC PANEL: CPT | Performed by: INTERNAL MEDICINE

## 2022-02-03 RX ORDER — AMLODIPINE BESYLATE 10 MG/1
10 TABLET ORAL DAILY
Qty: 90 TABLET | Refills: 3 | Status: SHIPPED | OUTPATIENT
Start: 2022-02-03 | End: 2023-05-04

## 2022-02-03 RX ORDER — HYDROCHLOROTHIAZIDE 25 MG/1
TABLET ORAL
Qty: 90 TABLET | Refills: 3 | Status: SHIPPED | OUTPATIENT
Start: 2022-02-03 | End: 2023-06-28

## 2022-02-03 RX ORDER — IRBESARTAN 300 MG/1
300 TABLET ORAL DAILY
Qty: 90 TABLET | Refills: 3 | Status: SHIPPED | OUTPATIENT
Start: 2022-02-03 | End: 2023-04-05

## 2022-02-03 RX ORDER — ATORVASTATIN CALCIUM 10 MG/1
10 TABLET, FILM COATED ORAL DAILY
Qty: 90 TABLET | Refills: 3 | Status: SHIPPED | OUTPATIENT
Start: 2022-02-03 | End: 2023-05-04

## 2022-02-03 ASSESSMENT — ACTIVITIES OF DAILY LIVING (ADL): CURRENT_FUNCTION: NO ASSISTANCE NEEDED

## 2022-02-03 ASSESSMENT — ENCOUNTER SYMPTOMS
CONSTIPATION: 0
ARTHRALGIAS: 1
PARESTHESIAS: 0
WEAKNESS: 0
DIZZINESS: 0
FEVER: 0
DYSURIA: 0
NERVOUS/ANXIOUS: 0
SHORTNESS OF BREATH: 0
HEADACHES: 0
COUGH: 0
MYALGIAS: 0
DIARRHEA: 0
HEMATOCHEZIA: 0
FREQUENCY: 0
ABDOMINAL PAIN: 0
NAUSEA: 0
JOINT SWELLING: 0
HEMATURIA: 0
HEARTBURN: 0
SORE THROAT: 0
PALPITATIONS: 0
CHILLS: 0

## 2022-02-03 ASSESSMENT — PAIN SCALES - GENERAL: PAINLEVEL: SEVERE PAIN (6)

## 2022-02-03 ASSESSMENT — MIFFLIN-ST. JEOR: SCORE: 1894.34

## 2022-02-03 NOTE — LETTER
February 7, 2022      Chrystal RENEE Will  [address] [place] [address]        Dear ,    We are writing to inform you of your test results.    The following letter pertains to your most recent diagnostic tests:     -The triglycerides are up a bit since last check.  Watch the starches and sugars in the diet to improve this.  Also, any amount of increased physical activity that leads to weight loss would help with this.  Alcohol consumption increases triglycerides in a dose-dependent fashion.       -Liver and gallbladder tests are normal for you. (ALT,AST, Alk phos, bilirubin), kidney function is normal for you (Creatinine, GFR), Sodium is normal, Potassium is normal for you, Calcium is normal for you, Glucose (blood sugar) is mildly elevated, but not in the diabetic range.       -TSH (thyroid stimulating hormone) level is normal which indicates normal circulating thyroid hormone levels.       -Your complete blood counts including your hemoglobin returned normal for you.               Bottom line:  No dangerous problems here.  Improving your lifestyle could improve triglycerides.   We should recheck in one year.         Follow up:  Schedule an appointment for a physical examination with fasting blood tests in one year's time, or return sooner if new questions, symptoms or problems arise.       Resulted Orders   Lipid panel reflex to direct LDL Fasting   Result Value Ref Range    Cholesterol 150 <200 mg/dL    Triglycerides 201 (H) <150 mg/dL    Direct Measure HDL 35 (L) >=40 mg/dL    LDL Cholesterol Calculated 75 <=100 mg/dL    Non HDL Cholesterol 115 <130 mg/dL    Patient Fasting > 8hrs? Yes     Narrative    Cholesterol  Desirable:  <200 mg/dL    Triglycerides  Normal:  Less than 150 mg/dL  Borderline High:  150-199 mg/dL  High:  200-499 mg/dL  Very High:  Greater than or equal to 500 mg/dL    Direct Measure HDL  Female:  Greater than or equal to 50 mg/dL   Male:  Greater than or equal to 40  mg/dL    LDL Cholesterol  Desirable:  <100mg/dL  Above Desirable:  100-129 mg/dL   Borderline High:  130-159 mg/dL   High:  160-189 mg/dL   Very High:  >= 190 mg/dL    Non HDL Cholesterol  Desirable:  130 mg/dL  Above Desirable:  130-159 mg/dL  Borderline High:  160-189 mg/dL  High:  190-219 mg/dL  Very High:  Greater than or equal to 220 mg/dL   Comprehensive metabolic panel   Result Value Ref Range    Sodium 136 133 - 144 mmol/L    Potassium 4.1 3.4 - 5.3 mmol/L    Chloride 103 94 - 109 mmol/L    Carbon Dioxide (CO2) 29 20 - 32 mmol/L    Anion Gap 4 3 - 14 mmol/L    Urea Nitrogen 12 7 - 30 mg/dL    Creatinine 0.78 0.66 - 1.25 mg/dL    Calcium 8.9 8.5 - 10.1 mg/dL    Glucose 104 (H) 70 - 99 mg/dL    Alkaline Phosphatase 80 40 - 150 U/L    AST 23 0 - 45 U/L    ALT 39 0 - 70 U/L    Protein Total 6.7 (L) 6.8 - 8.8 g/dL    Albumin 4.0 3.4 - 5.0 g/dL    Bilirubin Total 1.0 0.2 - 1.3 mg/dL    GFR Estimate >90 >60 mL/min/1.73m2      Comment:      Effective December 21, 2021 eGFRcr in adults is calculated using the 2021 CKD-EPI creatinine equation which includes age and gender (Indira ricardo al., NEJ, DOI: 10.1056/WYUBgo3815706)   CBC with platelets   Result Value Ref Range    WBC Count 7.2 4.0 - 11.0 10e3/uL    RBC Count 5.71 4.40 - 5.90 10e6/uL    Hemoglobin 17.3 13.3 - 17.7 g/dL    Hematocrit 50.1 40.0 - 53.0 %    MCV 88 78 - 100 fL    MCH 30.3 26.5 - 33.0 pg    MCHC 34.5 31.5 - 36.5 g/dL    RDW 13.6 10.0 - 15.0 %    Platelet Count 207 150 - 450 10e3/uL   TSH with free T4 reflex   Result Value Ref Range    TSH 2.48 0.40 - 4.00 mU/L       If you have any questions or concerns, please call the clinic at the number listed above.       Sincerely,      Lam Rodríguez MD

## 2022-02-03 NOTE — PROGRESS NOTES
"SUBJECTIVE:   Chrystal Solis is a 71 year old male who presents for Preventive Visit.      Patient has been advised of split billing requirements and indicates understanding: Yes  Are you in the first 12 months of your Medicare coverage?  No    Healthy Habits:     In general, how would you rate your overall health?  Good    Frequency of exercise:  1 day/week    Duration of exercise:  Less than 15 minutes    Do you usually eat at least 4 servings of fruit and vegetables a day, include whole grains    & fiber and avoid regularly eating high fat or \"junk\" foods?  No    Taking medications regularly:  Yes    Medication side effects:  None    Ability to successfully perform activities of daily living:  No assistance needed    Home Safety:  No safety concerns identified    Hearing Impairment:  Difficulty following a conversation in a noisy restaurant or crowded room, need to ask people to speak up or repeat themselves and find that men's voices are easier to understand than woman's    In the past 6 months, have you been bothered by leaking of urine?  No    In general, how would you rate your overall mental or emotional health?  Good      PHQ-2 Total Score: 2    Additional concerns today:  Yes    He sees another doctor about his mood  He does not want additional medications for this  He did not want me to write more in his chart about this   He stopped drinking when he was diagnosed with prostate papilloma    Do you feel safe in your environment? Yes    Have you ever done Advance Care Planning? (For example, a Health Directive, POLST, or a discussion with a medical provider or your loved ones about your wishes): No, advance care planning information given to patient to review.  Patient plans to discuss their wishes with loved ones or provider.         Fall risk  Fallen 2 or more times in the past year?: No  Any fall with injury in the past year?: No    Cognitive Screening   Patient declined cognitive " screening    Mini-CogTM Copyright CASSANDRA Smith. Licensed by the author for use in [facility]; reprinted with permission ([email or web address]). All rights reserved.      Do you have sleep apnea, excessive snoring or daytime drowsiness?: no    Reviewed and updated as needed this visit by clinical staff  Tobacco  Allergies  Meds   Med Hx  Surg Hx  Fam Hx         Reviewed and updated as needed this visit by Provider  Tobacco     Med Hx  Surg Hx  Fam Hx        Social History     Tobacco Use     Smoking status: Former Smoker     Packs/day: 0.25     Years: 2.00     Pack years: 0.50     Types: Cigarettes     Quit date: 1978     Years since quittin.1     Smokeless tobacco: Never Used     Tobacco comment: smoked socially in college   Substance Use Topics     Alcohol use: Not Currently     Alcohol/week: 0.0 standard drinks     If you drink alcohol do you typically have >3 drinks per day or >7 drinks per week? No    Alcohol Use 2/3/2022   Prescreen: >3 drinks/day or >7 drinks/week? Not Applicable   Prescreen: >3 drinks/day or >7 drinks/week? -         Current providers sharing in care for this patient include:   Patient Care Team:  Lam Rodríguez MD as PCP - General (Internal Medicine)  Lam Rodríguez MD as Assigned PCP  Yosi Campbell MD as Assigned Surgical Provider    The following health maintenance items are reviewed in Epic and correct as of today:  Health Maintenance Due   Topic Date Due     ANNUAL REVIEW OF  ORDERS  Never done     FALL RISK ASSESSMENT  10/21/2021     Patient Active Problem List   Diagnosis     Squamous cell carcinoma of tongue (H)     Benign essential hypertension     Hyperlipidemia LDL goal <100     Peripheral polyneuropathy     Advanced directives, counseling/discussion     Elevated prostate specific antigen (PSA)     Past Surgical History:   Procedure Laterality Date     BIOPSY       COLONOSCOPY  3/2009     COLONOSCOPY N/A 3/8/2017    Procedure: COMBINED  COLONOSCOPY, SINGLE OR MULTIPLE BIOPSY/POLYPECTOMY BY BIOPSY;  Surgeon: Yung Maguire MD;  Location:  GI     CYSTOSCOPY, TRANSURETHRAL RESECTION (TUR) PROSTATE, COMBINED N/A 3/24/2021    Procedure: Cystoscopy, transurethral resection of the prostate;  Surgeon: Yosi Campbell MD;  Location: [facility] OR     ENT SURGERY       EXCISE LESION TRUNK N/A 2/3/2017    Procedure: EXCISE LESION TRUNK;  Surgeon: Jaswinder Lopez MD;  Location:  [facility]     EXCISE LESION UPPER EXTREMITY Left 2/3/2017    Procedure: EXCISE LESION UPPER EXTREMITY;  Surgeon: Jaswinder Lopez MD;  Location: [facility]     HEAD & NECK SURGERY       HERNIA REPAIR Left      ORTHOPEDIC SURGERY  2002    torn ligament right shoulder     SOFT TISSUE SURGERY  12/6/10    excisional biopsy of tongue lesion     tonsillectomy  Age 6       Social History     Tobacco Use     Smoking status: Former Smoker     Packs/day: 0.25     Years: 2.00     Pack years: 0.50     Types: Cigarettes     Quit date: 1978     Years since quittin.1     Smokeless tobacco: Never Used     Tobacco comment: smoked socially in [employer]   Substance Use Topics     Alcohol use: Not Currently     Alcohol/week: 0.0 standard drinks     Family History   Problem Relation Age of Onset     Cerebrovascular Disease Mother      Hypertension Mother      Respiratory Father         heavy smoker     Cancer Maternal Grandfather         lip cancer--heavy smoker     Unknown/Adopted Son      Unknown/Adopted Daughter          Current Outpatient Medications   Medication Sig Dispense Refill     amLODIPine (NORVASC) 10 MG tablet TAKE ONE TABLET DAILY 90 tablet 2     atorvastatin (LIPITOR) 10 MG tablet TAKE ONE TABLET DAILY 90 tablet 0     hydrochlorothiazide (HYDRODIURIL) 25 MG tablet TAKE ONE TABLET DAILY AS NEEDED 90 tablet 3     irbesartan (AVAPRO) 300 MG tablet TAKE ONE TABLET BY MOUTH DAILY 90 tablet 2     ketoconazole (NIZORAL) 2 % external cream APPLY THIN LAYER TWICE DAILY FOR 4  WEEKS RUB IN WELL (Patient taking differently: daily as needed ) 60 g 1     sildenafil (VIAGRA) 25 MG tablet Take 4 tablets (100 mg) by mouth daily as needed 30 min to 4 hrs before sex. Do not use with nitroglycerin, terazosin or doxazosin. (Patient not taking: Reported on 3/9/2021) 16 tablet 11     No Known Allergies      Review of Systems   Constitutional: Negative for chills and fever.   HENT: Positive for hearing loss. Negative for congestion and sore throat.    Eyes: Negative for visual disturbance.   Respiratory: Negative for cough and shortness of breath.    Cardiovascular: Negative for chest pain, palpitations and peripheral edema.   Gastrointestinal: Negative for abdominal pain, constipation, diarrhea, heartburn, hematochezia and nausea.   Genitourinary: Negative for dysuria, frequency, genital sores, hematuria and urgency.   Musculoskeletal: Positive for arthralgias. Negative for joint swelling and myalgias.   Skin: Negative for rash.   Neurological: Negative for dizziness, weakness, headaches and paresthesias.   Psychiatric/Behavioral: Negative for mood changes. The patient is not nervous/anxious.          OBJECTIVE:   /75 (BP Location: Right arm, Cuff Size: Adult Large)   Pulse 59   Temp 96.8  F (36  C) (Tympanic)   Resp 16   Ht 1.829 m (6')   Wt 109.6 kg (241 lb 11.2 oz)   SpO2 96%   BMI 32.78 kg/m   Estimated body mass index is 32.78 kg/m  as calculated from the following:    Height as of this encounter: 1.829 m (6').    Weight as of this encounter: 109.6 kg (241 lb 11.2 oz).  Physical Exam  GENERAL: healthy, alert and no distress  EYES: Eyes grossly normal to inspection, PERRL and conjunctivae and sclerae normal  HENT: ear canals and TM's normal, nose and mouth without ulcers or lesions  NECK: no adenopathy, no asymmetry, masses, or scars and thyroid normal to palpation  RESP: lungs clear to auscultation - no rales, rhonchi or wheezes  CV: regular rate and rhythm, normal S1 S2, no S3 or  S4, no murmur, click or rub, no peripheral edema and peripheral pulses strong  ABDOMEN: soft, nontender, no hepatosplenomegaly, no masses and bowel sounds normal  MS: no gross musculoskeletal defects noted, no edema  SKIN: no suspicious lesions or rashes  NEURO: Normal strength and tone, mentation intact and speech normal  PSYCH: mentation appears normal, affect normal/bright  He declined a prostate exam today as he recently saw urology     Labs pending     ASSESSMENT / PLAN:   (Z00.00) Routine general medical examination at a health care facility  (primary encounter diagnosis)      (C02.9) Squamous cell carcinoma of tongue (H)  Comment: history of this   Plan: OFFICE/OUTPT VISIT,EST,LEVL III            (R97.20) Elevated prostate specific antigen (PSA)  Comment: continue follow up with urology re enlarged prostate and prostate papilloma   Plan: OFFICE/OUTPT VISIT,EST,LEVL III            (I10) Benign essential hypertension  Comment: blood pressure well controlled   Plan: OFFICE/OUTPT VISIT,EST,LEVL III, amLODIPine         (NORVASC) 10 MG tablet, hydrochlorothiazide         (HYDRODIURIL) 25 MG tablet, irbesartan (AVAPRO)        300 MG tablet            (E78.5) Hyperlipidemia LDL goal <100  Comment: on statin therapy   Plan: Lipid panel reflex to direct LDL Fasting,         Comprehensive metabolic panel, CBC with         platelets, OFFICE/OUTPT VISIT,EST,LEVL III            (G62.9) Peripheral polyneuropathy  Comment: stable symptoms  Plan: OFFICE/OUTPT VISIT,EST,LEVL III            (Z12.11) Screen for colon cancer  Comment:   Plan: Adult Gastro Ref - Procedure Only              Patient has been advised of split billing requirements and indicates understanding: Yes    COUNSELING:  Reviewed preventive health counseling, as reflected in patient instructions  Special attention given to:       Consider AAA screening for ages 65-75 and smoking history; done 2017       Regular exercise       Healthy diet/nutrition        Immunizations    Vaccines are up to date          Hepatitis C screening       HIV screening for high risk patient       Consider lung cancer screening for ages 55-80 years and 30 pack-year smoking history ; quit smoking in college        Colon cancer screening; due for follow up next month; made referral; discussed with patient        Prostate cancer screening; continue surveillance with Dr. Campbell (urology)     Estimated body mass index is 32.78 kg/m  as calculated from the following:    Height as of this encounter: 1.829 m (6').    Weight as of this encounter: 109.6 kg (241 lb 11.2 oz).    Weight management plan: Discussed healthy diet and exercise guidelines    He reports that he quit smoking about 44 years ago. His smoking use included cigarettes. He has a 0.50 pack-year smoking history. He has never used smokeless tobacco.      Appropriate preventive services were discussed with this patient, including applicable screening as appropriate for cardiovascular disease, diabetes, osteopenia/osteoporosis, and glaucoma.  As appropriate for age/gender, discussed screening for colorectal cancer, prostate cancer, breast cancer, and cervical cancer. Checklist reviewing preventive services available has been given to the patient.    Reviewed patients plan of care and provided an AVS. The Basic Care Plan (routine screening as documented in Health Maintenance) for Chrystal meets the Care Plan requirement. This Care Plan has been established and reviewed with the Patient.    Counseling Resources:  ATP IV Guidelines  Pooled Cohorts Equation Calculator  Breast Cancer Risk Calculator  Breast Cancer: Medication to Reduce Risk  FRAX Risk Assessment  ICSI Preventive Guidelines  Dietary Guidelines for Americans, 2010  [employer] MyPlate  ASA Prophylaxis  Lung CA Screening    Lam Rodríguez MD  [facility]    Identified Health Risks:

## 2022-02-04 LAB
ALBUMIN SERPL-MCNC: 4 G/DL (ref 3.4–5)
ALP SERPL-CCNC: 80 U/L (ref 40–150)
ALT SERPL W P-5'-P-CCNC: 39 U/L (ref 0–70)
ANION GAP SERPL CALCULATED.3IONS-SCNC: 4 MMOL/L (ref 3–14)
AST SERPL W P-5'-P-CCNC: 23 U/L (ref 0–45)
BILIRUB SERPL-MCNC: 1 MG/DL (ref 0.2–1.3)
BUN SERPL-MCNC: 12 MG/DL (ref 7–30)
CALCIUM SERPL-MCNC: 8.9 MG/DL (ref 8.5–10.1)
CHLORIDE BLD-SCNC: 103 MMOL/L (ref 94–109)
CHOLEST SERPL-MCNC: 150 MG/DL
CO2 SERPL-SCNC: 29 MMOL/L (ref 20–32)
CREAT SERPL-MCNC: 0.78 MG/DL (ref 0.66–1.25)
FASTING STATUS PATIENT QL REPORTED: YES
GFR SERPL CREATININE-BSD FRML MDRD: >90 ML/MIN/1.73M2
GLUCOSE BLD-MCNC: 104 MG/DL (ref 70–99)
HDLC SERPL-MCNC: 35 MG/DL
LDLC SERPL CALC-MCNC: 75 MG/DL
NONHDLC SERPL-MCNC: 115 MG/DL
POTASSIUM BLD-SCNC: 4.1 MMOL/L (ref 3.4–5.3)
PROT SERPL-MCNC: 6.7 G/DL (ref 6.8–8.8)
SODIUM SERPL-SCNC: 136 MMOL/L (ref 133–144)
TRIGL SERPL-MCNC: 201 MG/DL
TSH SERPL DL<=0.005 MIU/L-ACNC: 2.48 MU/L (ref 0.4–4)

## 2022-02-04 NOTE — RESULT ENCOUNTER NOTE
The following letter pertains to your most recent diagnostic tests:    -The triglycerides are up a bit since last check.  Watch the starches and sugars in the diet to improve this.  Also, any amount of increased physical activity that leads to weight loss would help with this.  Alcohol consumption increases triglycerides in a dose-dependent fashion.      -Liver and gallbladder tests are normal for you. (ALT,AST, Alk phos, bilirubin), kidney function is normal for you (Creatinine, GFR), Sodium is normal, Potassium is normal for you, Calcium is normal for you, Glucose (blood sugar) is mildly elevated, but not in the diabetic range.      -TSH (thyroid stimulating hormone) level is normal which indicates normal circulating thyroid hormone levels.      -Your complete blood counts including your hemoglobin returned normal for you.             Bottom line:  No dangerous problems here.  Improving your lifestyle could improve triglycerides.   We should recheck in one year.        Follow up:  Schedule an appointment for a physical examination with fasting blood tests in one year's time, or return sooner if new questions, symptoms or problems arise.        Sincerely,    Dr. Rodríguez

## 2022-02-15 DIAGNOSIS — Z11.59 ENCOUNTER FOR SCREENING FOR OTHER VIRAL DISEASES: Primary | ICD-10-CM

## 2022-02-28 ENCOUNTER — LAB (OUTPATIENT)
Dept: LAB | Facility: CLINIC | Age: 72
End: 2022-02-28
Attending: INTERNAL MEDICINE
Payer: COMMERCIAL

## 2022-02-28 DIAGNOSIS — Z11.59 ENCOUNTER FOR SCREENING FOR OTHER VIRAL DISEASES: ICD-10-CM

## 2022-02-28 PROCEDURE — U0003 INFECTIOUS AGENT DETECTION BY NUCLEIC ACID (DNA OR RNA); SEVERE ACUTE RESPIRATORY SYNDROME CORONAVIRUS 2 (SARS-COV-2) (CORONAVIRUS DISEASE [COVID-19]), AMPLIFIED PROBE TECHNIQUE, MAKING USE OF HIGH THROUGHPUT TECHNOLOGIES AS DESCRIBED BY CMS-2020-01-R: HCPCS

## 2022-03-01 LAB — SARS-COV-2 RNA RESP QL NAA+PROBE: NEGATIVE

## 2022-03-03 ENCOUNTER — HOSPITAL ENCOUNTER (OUTPATIENT)
Facility: CLINIC | Age: 72
Discharge: HOME | End: 2022-03-03
Attending: INTERNAL MEDICINE | Admitting: INTERNAL MEDICINE
Payer: COMMERCIAL

## 2022-03-03 VITALS
HEART RATE: 61 BPM | DIASTOLIC BLOOD PRESSURE: 77 MMHG | TEMPERATURE: 97.6 F | HEIGHT: 72 IN | OXYGEN SATURATION: 93 % | BODY MASS INDEX: 31.02 KG/M2 | WEIGHT: 229 LBS | SYSTOLIC BLOOD PRESSURE: 122 MMHG | RESPIRATION RATE: 14 BRPM

## 2022-03-03 LAB — COLONOSCOPY: NORMAL

## 2022-03-03 PROCEDURE — 88305 TISSUE EXAM BY PATHOLOGIST: CPT | Mod: TC | Performed by: INTERNAL MEDICINE

## 2022-03-03 PROCEDURE — 250N000011 HC RX IP 250 OP 636: Performed by: INTERNAL MEDICINE

## 2022-03-03 PROCEDURE — 45380 COLONOSCOPY AND BIOPSY: CPT | Performed by: INTERNAL MEDICINE

## 2022-03-03 PROCEDURE — G0500 MOD SEDAT ENDO SERVICE >5YRS: HCPCS | Performed by: INTERNAL MEDICINE

## 2022-03-03 RX ORDER — ONDANSETRON 2 MG/ML
4 INJECTION INTRAMUSCULAR; INTRAVENOUS
Status: DISCONTINUED | OUTPATIENT
Start: 2022-03-03 | End: 2022-03-03 | Stop reason: HOSPADM

## 2022-03-03 RX ORDER — ATROPINE SULFATE 0.4 MG/ML
0.4 AMPUL (ML) INJECTION
Status: DISCONTINUED | OUTPATIENT
Start: 2022-03-03 | End: 2022-03-03 | Stop reason: HOSPADM

## 2022-03-03 RX ORDER — FENTANYL CITRATE 0.05 MG/ML
25 INJECTION, SOLUTION INTRAMUSCULAR; INTRAVENOUS
Status: DISCONTINUED | OUTPATIENT
Start: 2022-03-03 | End: 2022-03-03 | Stop reason: HOSPADM

## 2022-03-03 RX ORDER — SIMETHICONE 40MG/0.6ML
133 SUSPENSION, DROPS(FINAL DOSAGE FORM)(ML) ORAL
Status: DISCONTINUED | OUTPATIENT
Start: 2022-03-03 | End: 2022-03-03 | Stop reason: HOSPADM

## 2022-03-03 RX ORDER — PROCHLORPERAZINE MALEATE 5 MG
5 TABLET ORAL EVERY 6 HOURS PRN
Status: DISCONTINUED | OUTPATIENT
Start: 2022-03-03 | End: 2022-03-03 | Stop reason: HOSPADM

## 2022-03-03 RX ORDER — NALOXONE HYDROCHLORIDE 0.4 MG/ML
0.2 INJECTION, SOLUTION INTRAMUSCULAR; INTRAVENOUS; SUBCUTANEOUS
Status: DISCONTINUED | OUTPATIENT
Start: 2022-03-03 | End: 2022-03-03 | Stop reason: HOSPADM

## 2022-03-03 RX ORDER — EPINEPHRINE 1 MG/ML
0.1 INJECTION, SOLUTION INTRAMUSCULAR; SUBCUTANEOUS
Status: DISCONTINUED | OUTPATIENT
Start: 2022-03-03 | End: 2022-03-03 | Stop reason: HOSPADM

## 2022-03-03 RX ORDER — ONDANSETRON 4 MG/1
4 TABLET, ORALLY DISINTEGRATING ORAL EVERY 6 HOURS PRN
Status: DISCONTINUED | OUTPATIENT
Start: 2022-03-03 | End: 2022-03-03 | Stop reason: HOSPADM

## 2022-03-03 RX ORDER — NALOXONE HYDROCHLORIDE 0.4 MG/ML
0.4 INJECTION, SOLUTION INTRAMUSCULAR; INTRAVENOUS; SUBCUTANEOUS
Status: DISCONTINUED | OUTPATIENT
Start: 2022-03-03 | End: 2022-03-03 | Stop reason: HOSPADM

## 2022-03-03 RX ORDER — ONDANSETRON 2 MG/ML
4 INJECTION INTRAMUSCULAR; INTRAVENOUS EVERY 6 HOURS PRN
Status: DISCONTINUED | OUTPATIENT
Start: 2022-03-03 | End: 2022-03-03 | Stop reason: HOSPADM

## 2022-03-03 RX ORDER — FLUMAZENIL 0.1 MG/ML
0.2 INJECTION, SOLUTION INTRAVENOUS
Status: DISCONTINUED | OUTPATIENT
Start: 2022-03-03 | End: 2022-03-03 | Stop reason: HOSPADM

## 2022-03-03 RX ORDER — LIDOCAINE 40 MG/G
CREAM TOPICAL
Status: DISCONTINUED | OUTPATIENT
Start: 2022-03-03 | End: 2022-03-03 | Stop reason: HOSPADM

## 2022-03-03 RX ORDER — FENTANYL CITRATE 0.05 MG/ML
25-50 INJECTION, SOLUTION INTRAMUSCULAR; INTRAVENOUS
Status: COMPLETED | OUTPATIENT
Start: 2022-03-03 | End: 2022-03-03

## 2022-03-03 RX ADMIN — FENTANYL CITRATE 100 MCG: 50 INJECTION INTRAMUSCULAR; INTRAVENOUS at 13:05

## 2022-03-03 RX ADMIN — MIDAZOLAM 2 MG: 1 INJECTION INTRAMUSCULAR; INTRAVENOUS at 13:05

## 2022-03-03 NOTE — LETTER
February 15, 2022      Chrystal Solis  [address] [place] [address]        Dear Chrystal,     Please be aware that coverage of these services is subject to the terms and limitations of your health insurance plan.  Call member services at your health plan with any benefit or coverage questions.    Thank you for choosing [facility]. You are scheduled for the following service(s):    Date:  Thursday March 3, 2022             Procedure:  COLONOSCOPY  Doctor:        Yung Maguire MD   Arrival Time:  12:15pm *Enter and check in at the Main Hospital Entrance*  Procedure Time:  1:00pm      Location:   [facility]        Endoscopy Department, First Floor         [address] [place], [place] [address]      [phone] or [phone] ([facility]) to reschedule        MIRALAX -GATORADE  PREP  Colonoscopy is the most accurate test to detect colon polyps and colon cancer; and the only test where polyps can be removed. During this procedure, a doctor examines the lining of your large intestine and rectum through a flexible tube.   Transportation  You must arrange for a ride for the day of your procedure with a responsible adult. A taxi , Uber, etc, is not an option unless you are accompanied by a responsible adult. If you fail to arrange transportation with a responsible adult, your procedure will be cancelled and rescheduled.    Purchase the  following supplies at your local pharmacy:  - 2 (two) bisacodyl tablets: each tablet contains 5 mg.  (Dulcolax  laxative NOT Dulcolax  stool softener)   - 1 (one) 8.3 oz bottle of Polyethylene Glycol (PEG) 3350 Powder   (MiraLAX , Smooth LAX , ClearLAX  or equivalent)  - 64 oz Gatorade    Regular Gatorade, Gatorade G2 , Powerade , Powerade Zero  or Pedialyte  is acceptable. Red colored flavors are not allowed; all other colors (yellow, green, orange, purple and blue) are okay. It is also okay to buy two 2.12 oz packets of  powdered Gatorade that can be mixed with water to a total volume of 64 oz of liquid.  - 1 (one) 10 oz bottle of Magnesium Citrate (Red colored flavors are not allowed)  It is also okay for you to use a 0.5 oz package of powdered magnesium citrate (17 g) mixed with 10 oz of water.      PREPARATION FOR COLONOSCOPY    7 days before:    Discontinue fiber supplements and medications containing iron. This includes Metamucil  and Fibercon ; and multivitamins with iron.    3 days before:    Begin a low-fiber diet. A low-fiber diet helps making the cleanout more effective.     Examples of a low-fiber diet include (but are not limited to): white bread, white rice, pasta, crackers, fish, chicken, eggs, ground beef, creamy peanut butter, cooked/steamed/boiled vegetables, canned fruit, bananas, melons, milk, plain yogurt cheese, salad dressing and other condiments.     The following are not allowed on a low-fiber diet: seeds, nuts, popcorn, bran, whole wheat, corn, quinoa, raw fruits and vegetables, berries and dried fruit, beans and lentils.    For additional details on low-fiber diet, please refer to the table on the last page.    2 days before:    Continue the low-fiber diet.     Drink at least 8 glasses of water throughout the day.     Stop eating solid foods at 11:45 pm.    1 day before:    In the morning: begin a clear liquid diet (liquids you can see through).     Examples of a clear liquid diet include: water, clear broth or bouillon, Gatorade, Pedialyte or Powerade, carbonated and non-carbonated soft drinks (Sprite , 7-Up , ginger ale), strained fruit juices without pulp (apple, white grape, white cranberry), Jell-O  and popsicles.     The following are not allowed on a clear liquid diet: red liquids, alcoholic beverages, dairy products (milk, creamer, and yogurt), protein shakes, creamy broths, juice with pulp and chewing tobacco.    At noon: take 2 (two) bisacodyl tablets     At 4 (and no later than 6pm): start  drinking the Miralax-Gatorade preparation (8.3 oz of Miralax mixed with 64 oz of Gatorade in a large pitcher). Drink 1(one) 8 oz glass every 15 minutes thereafter, until the mixture is gone.    COLON CLEANSING TIPS: drink adequate amounts of fluids before and after your colon cleansing to prevent dehydration. Stay near a toilet because you will have diarrhea. Even if you are sitting on the toilet, continue to drink the cleansing solution every 15 minutes. If you feel nauseous or vomit, rinse your mouth with water, take a 15 to 30-minute-break and then continue drinking the solution. You will be uncomfortable until the stool has flushed from your colon (in about 2 to 4 hours). You may feel chilled.    Day of your procedure  You may take all of your morning medications including blood pressure medications, blood thinners (if you have not been instructed to stop these by our office), methadone, anti-seizure medications with sips of water 3 hours prior to your procedure or earlier. Do not take insulin or vitamins prior to your procedure. Continue the clear liquid diet.       4 hours prior: drink 10 oz of magnesium citrate. It may be easier to drink it with a straw.    STOP consuming all liquids after that.     Do not take anything by mouth during this time.     Allow extra time to travel to your procedure as you may need to stop and use a restroom along the way.    You are ready for the procedure, if you followed all instructions and your stool is no longer formed, but clear or yellow liquid. If you are unsure whether your colon is clean, please call our office at [phone] before you leave for your appointment.    Bring the following to your procedure:  - Insurance Card/Photo ID.   - List of current medications including over-the-counter medications and supplements.   - Your rescue inhaler if you currently use one to control asthma.    Canceling or rescheduling your appointment:   If you must cancel or reschedule  your appointment, please call [phone] as soon as possible.      COLONOSCOPY PRE-PROCEDURE CHECKLIST    If you have diabetes, ask your regular doctor for diet and medication restrictions.  If you take an anticoagulant or anti-platelet medication (such as Coumadin , Lovenox , Pradaxa , Xarelto , Eliquis , etc.), please call your primary doctor for advice on holding this medication.  If you take aspirin you may continue to do so.  If you are or may be pregnant, please discuss the risks and benefits of this procedure with your doctor.        What happens during a colonoscopy?    Plan to spend up to two hours, starting at registration time, at the endoscopy center the day of your procedure. The colonoscopy takes an average of 15 to 30 minutes. Recovery time is about 30 minutes.      Before the exam:    You will change into a gown.    Your medical history and medication list will be reviewed with you, unless that has been done over the phone prior to the procedure.     A nurse will insert an intravenous (IV) line into your hand or arm.    The doctor will meet with you and will give you a consent form to sign.  During the exam:     Medicine will be given through the IV line to help you relax.     Your heart rate and oxygen levels will be monitored. If your blood pressure is low, you may be given fluids through the IV line.     The doctor will insert a flexible hollow tube, called a colonoscope, into your rectum. The scope will be advanced slowly through the large intestine (colon).    You may have a feeling of fullness or pressure.     If an abnormal tissue or a polyp is found, the doctor may remove it through the endoscope for closer examination, or biopsy. Tissue removal is painless    After the exam:           Any tissue samples removed during the exam will be sent to a lab for evaluation. It may take 5-7 working days for you to be notified of the results.     A nurse will provide you with complete discharge  instructions before you leave the endoscopy center. Be sure to ask the nurse for specific instructions if you take blood thinners such as Aspirin, Coumadin or Plavix.     The doctor will prepare a full report for you and for the physician who referred you for the procedure.     Your doctor will talk with you about the initial results of your exam.      Medication given during the exam will prohibit you from driving for the rest of the day.     Following the exam, you may resume your normal diet. Your first meal should be light, no greasy foods. Avoid alcohol until the next day.     You may resume your regular activities the day after the procedure.         LOW-FIBER DIET    Foods RECOMMENDED Foods to AVOID   Breads, Cereal, Rice and Pasta:   White bread, rolls, biscuits, croissant and neftaly toast.   Waffles, Italian toast and pancakes.   White rice, noodles, pasta, macaroni and peeled cooked potatoes.   Plain crackers and saltines.   Cooked cereals: farina, cream of rice.   Cold cereals: Puffed Rice , Rice Krispies , Corn Flakes  and Special K    Breads, Cereal, Rice and Pasta:   Breads or rolls with nuts, seeds or fruit.   Whole wheat, pumpernickel, rye breads and cornbread.   Potatoes with skin, brown or wild rice, and kasha (buckwheat).     Vegetables:   Tender cooked and canned vegetables without seeds: carrots, asparagus tips, green or wax beans, pumpkin, spinach, lima beans. Vegetables:   Raw or steamed vegetables.   Vegetables with seeds.   Sauerkraut.   Winter squash, peas, broccoli, Brussel sprouts, cabbage, onions, cauliflower, baked beans, peas and corn.   Fruits:   Strained fruit juice.   Canned fruit, except pineapple.   Ripe bananas and melon. Fruits:   Prunes and prune juice.   Raw fruits.   Dried fruits: figs, dates and raisins.   Milk/Dairy:   Milk: plain or flavored.   Yogurt, custard and ice cream.   Cheese and cottage cheese Milk/Dairy:     Meat and other proteins:   ground, well-cooked tender  beef, lamb, ham, veal, pork, fish, poultry and organ meats.   Eggs.   Peanut butter without nuts. Meat and other proteins:   Tough, fibrous meats with gristle.   Dry beans, peas and lentils.   Peanut butter with nuts.   Tofu.   Fats, Snack, Sweets, Condiments and Beverages:   Margarine, butter, oils, mayonnaise, sour cream and salad dressing, plain gravy.   Sugar, hard candy, clear jelly, honey and syrup.   Spices, cooked herbs, bouillon, broth and soups made with allowed vegetable, ketchup and mustard.   Coffee, tea and carbonated drinks.   Plain cakes, cookies and pretzels.   Gelatin, plain puddings, custard, ice cream, sherbet and popsicles. Fats, Snack, Sweets, Condiments and Beverages:   Nuts, seeds and coconut.   Jam, marmalade and preserves.   Pickles, olives, relish and horseradish.   All desserts containing nuts, seeds, dried fruit and coconut; or made from whole grains or bran.   Candy made with nuts or seeds.   Popcorn.         DIRECTIONS TO THE ENDOSCOPY DEPARTMENT    From the north ([facility])  Take [address] South, exit on [address] [address]. Get into the left hand arya, turn left (east), go one-half mile to [address] and turn left. Go north to the second stoplight, take a right on [address] and follow it to the Main Hospital entrance.    From the south ([facility])  Take 35N to the 35E split and exit on [address] [address]. On [address] [address], turn left (west) to [address]. Turn right (north) on [address]. Go north to the second stoplight, take a right on [address] and follow it to the Main Hospital entrance.    From the east via 35E ([facility])  Take 35E south to [address] [address] exit. Turn right on [address] [address]. Go west to [address]. Turn right (north) on [address]. Go to the second stoplight, take a right on [address] to the Main Hospital entrance.    From the east via [address] ([facility])  Take [facility] [address]  West to [address]. Turn left (south) on [address] to [address], turn left (east) on [address] and follow it to the Main Hospital entrance.    From the west via [address] ([place])  Take [address] east to [address]. Turn right (south) on [address] to [address], turn left (east) on [address] and follow it to the Main Hospital entrance.

## 2022-03-03 NOTE — H&P
Pre-Endoscopy History and Physical     Chrystal Solis MRN# [id number]   YOB: 1950 Age: 71 year old     Date of Procedure: 3/3/2022  Primary care provider: Lam Rodríguez  Type of Endoscopy: Colonoscopy with possible biopsy, possible polypectomy  Reason for Procedure: polyp  Type of Anesthesia Anticipated: Conscious Sedation    HPI:    Chrystal is a 71 year old male who will be undergoing the above procedure.      A history and physical has been performed. The patient's medications and allergies have been reviewed. The risks and benefits of the procedure and the sedation options and risks were discussed with the patient.  All questions were answered and informed consent was obtained.      He denies a personal or family history of anesthesia complications or bleeding disorders.     Patient Active Problem List   Diagnosis     Squamous cell carcinoma of tongue (H)     Benign essential hypertension     Hyperlipidemia LDL goal <100     Peripheral polyneuropathy     Advanced directives, counseling/discussion     Elevated prostate specific antigen (PSA)        Past Medical History:   Diagnosis Date     Arthritis      Benign essential hypertension 1/11/2017     Bronchitis      Hearing loss, mixed, bilateral      Hernia, abdominal      Hyperlipidemia LDL goal <130 1/11/2017     Hypertension      Intervertebral cervical disc disorder with myelopathy, cervical region 2006    had injections     Malignant neoplasm (H) 12/6/10    squamous cell of tongue      Mumps      Numbness and tingling     numbness in toes     Penile discharge     urethritis, as a teenager     Peripheral polyneuropathy 1/11/2017     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        Past Surgical History:   Procedure Laterality Date     BIOPSY       COLONOSCOPY  3/2009     COLONOSCOPY N/A 3/8/2017    Procedure: COMBINED COLONOSCOPY, SINGLE OR MULTIPLE BIOPSY/POLYPECTOMY BY BIOPSY;  Surgeon: Yung Maguire MD;  Location:  GI      CYSTOSCOPY, TRANSURETHRAL RESECTION (TUR) PROSTATE, COMBINED N/A 3/24/2021    Procedure: Cystoscopy, transurethral resection of the prostate;  Surgeon: Yosi Campbell MD;  Location: [facility] OR     ENT SURGERY       EXCISE LESION TRUNK N/A 2/3/2017    Procedure: EXCISE LESION TRUNK;  Surgeon: Jaswinder Lopez MD;  Location:  [facility]     EXCISE LESION UPPER EXTREMITY Left 2/3/2017    Procedure: EXCISE LESION UPPER EXTREMITY;  Surgeon: Jaswinder Lopez MD;  Location: [facility]     HEAD & NECK SURGERY       HERNIA REPAIR Left      ORTHOPEDIC SURGERY  2002    torn ligament right shoulder     SOFT TISSUE SURGERY  12/6/10    excisional biopsy of tongue lesion     tonsillectomy  Age 6       Social History     Tobacco Use     Smoking status: Former Smoker     Packs/day: 0.25     Years: 2.00     Pack years: 0.50     Types: Cigarettes     Quit date: 1978     Years since quittin.1     Smokeless tobacco: Never Used     Tobacco comment: smoked socially in college   Substance Use Topics     Alcohol use: Not Currently     Alcohol/week: 0.0 standard drinks       Family History   Problem Relation Age of Onset     Cerebrovascular Disease Mother      Hypertension Mother      Respiratory Father         heavy smoker     Cancer Maternal Grandfather         lip cancer--heavy smoker     Unknown/Adopted Son      Unknown/Adopted Daughter        Prior to Admission medications    Medication Sig Start Date End Date Taking? Authorizing Provider   amLODIPine (NORVASC) 10 MG tablet Take 1 tablet (10 mg) by mouth daily 2/3/22   Lam Rodríguez MD   atorvastatin (LIPITOR) 10 MG tablet Take 1 tablet (10 mg) by mouth daily 2/3/22   Lam Rodríguez MD   hydrochlorothiazide (HYDRODIURIL) 25 MG tablet TAKE ONE TABLET DAILY AS NEEDED 2/3/22   Lam Rodríguez MD   irbesartan (AVAPRO) 300 MG tablet Take 1 tablet (300 mg) by mouth daily 2/3/22   Lam Rodríguez MD   ketoconazole (NIZORAL) 2 % external cream APPLY THIN LAYER TWICE  DAILY FOR 4 WEEKS RUB IN WELL  Patient taking differently: daily as needed  9/30/21   Lam Rodríguez MD   sildenafil (VIAGRA) 25 MG tablet Take 4 tablets (100 mg) by mouth daily as needed 30 min to 4 hrs before sex. Do not use with nitroglycerin, terazosin or doxazosin.  Patient not taking: Reported on 3/9/2021 6/18/20   Lam Rodríguez MD       No Known Allergies     REVIEW OF SYSTEMS:   5 point ROS negative except as noted above in HPI, including Gen., Resp., CV, GI &  system review.    PHYSICAL EXAM:   There were no vitals taken for this visit. Estimated body mass index is 32.78 kg/m  as calculated from the following:    Height as of 2/3/22: 1.829 m (6').    Weight as of 2/3/22: 109.6 kg (241 lb 11.2 oz).   GENERAL APPEARANCE: alert, and oriented  MENTAL STATUS: alert  AIRWAY EXAM: Mallampatti Class I (visualization of the soft palate, fauces, uvula, anterior and posterior pillars)  RESP: lungs clear to auscultation - no rales, rhonchi or wheezes  CV: regular rates and rhythm  DIAGNOSTICS:    Not indicated    IMPRESSION   ASA Class 2 - Mild systemic disease    PLAN:   Plan for Colonoscopy with possible biopsy, possible polypectomy. We discussed the risks, benefits and alternatives and the patient wished to proceed.    The above has been forwarded to the consulting provider.      Signed Electronically by: Yung Maguire MD  March 3, 2022

## 2022-03-04 LAB
PATH REPORT.COMMENTS IMP SPEC: NORMAL
PATH REPORT.COMMENTS IMP SPEC: NORMAL
PATH REPORT.FINAL DX SPEC: NORMAL
PATH REPORT.GROSS SPEC: NORMAL
PATH REPORT.MICROSCOPIC SPEC OTHER STN: NORMAL
PATH REPORT.RELEVANT HX SPEC: NORMAL
PHOTO IMAGE: NORMAL

## 2022-03-04 PROCEDURE — 88305 TISSUE EXAM BY PATHOLOGIST: CPT | Mod: 26 | Performed by: PATHOLOGY

## 2022-10-23 ENCOUNTER — HEALTH MAINTENANCE LETTER (OUTPATIENT)
Age: 72
End: 2022-10-23

## 2023-02-07 ENCOUNTER — OFFICE VISIT (OUTPATIENT)
Dept: UROLOGY | Facility: CLINIC | Age: 73
End: 2023-02-07
Payer: COMMERCIAL

## 2023-02-07 VITALS
DIASTOLIC BLOOD PRESSURE: 80 MMHG | SYSTOLIC BLOOD PRESSURE: 110 MMHG | HEART RATE: 65 BPM | HEIGHT: 72 IN | WEIGHT: 240 LBS | BODY MASS INDEX: 32.51 KG/M2 | OXYGEN SATURATION: 97 %

## 2023-02-07 DIAGNOSIS — R97.20 ELEVATED PROSTATE SPECIFIC ANTIGEN (PSA): Primary | ICD-10-CM

## 2023-02-07 DIAGNOSIS — Z79.2 PROPHYLACTIC ANTIBIOTIC: ICD-10-CM

## 2023-02-07 DIAGNOSIS — Z85.51 PERSONAL HISTORY OF MALIGNANT NEOPLASM OF BLADDER: ICD-10-CM

## 2023-02-07 DIAGNOSIS — N40.0 ENLARGED PROSTATE: ICD-10-CM

## 2023-02-07 LAB
ALBUMIN UR-MCNC: NEGATIVE MG/DL
APPEARANCE UR: CLEAR
BILIRUB UR QL STRIP: NEGATIVE
COLOR UR AUTO: YELLOW
GLUCOSE UR STRIP-MCNC: NEGATIVE MG/DL
HGB UR QL STRIP: NEGATIVE
KETONES UR STRIP-MCNC: ABNORMAL MG/DL
LEUKOCYTE ESTERASE UR QL STRIP: NEGATIVE
NITRATE UR QL: NEGATIVE
PH UR STRIP: 6.5 [PH] (ref 5–7)
PSA SERPL-MCNC: 4.2 UG/L (ref 0–4)
SP GR UR STRIP: 1.01 (ref 1–1.03)
UROBILINOGEN UR STRIP-ACNC: 1 E.U./DL

## 2023-02-07 PROCEDURE — 84153 ASSAY OF PSA TOTAL: CPT | Performed by: UROLOGY

## 2023-02-07 PROCEDURE — 99213 OFFICE O/P EST LOW 20 MIN: CPT | Mod: 25 | Performed by: UROLOGY

## 2023-02-07 PROCEDURE — 81003 URINALYSIS AUTO W/O SCOPE: CPT | Performed by: UROLOGY

## 2023-02-07 PROCEDURE — 36415 COLL VENOUS BLD VENIPUNCTURE: CPT | Performed by: UROLOGY

## 2023-02-07 PROCEDURE — 52000 CYSTOURETHROSCOPY: CPT | Performed by: UROLOGY

## 2023-02-07 RX ORDER — CIPROFLOXACIN 500 MG/1
500 TABLET, FILM COATED ORAL ONCE
Qty: 1 TABLET | Refills: 0 | Status: SHIPPED | OUTPATIENT
Start: 2023-02-07 | End: 2023-02-07

## 2023-02-07 RX ORDER — LIDOCAINE HYDROCHLORIDE 20 MG/ML
JELLY TOPICAL ONCE
Status: COMPLETED | OUTPATIENT
Start: 2023-02-07 | End: 2023-02-07

## 2023-02-07 RX ADMIN — LIDOCAINE HYDROCHLORIDE 5 ML: 20 JELLY TOPICAL at 08:44

## 2023-02-07 ASSESSMENT — PAIN SCALES - GENERAL: PAINLEVEL: NO PAIN (0)

## 2023-02-07 NOTE — PROGRESS NOTES
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  His PSA today is back up a small amount at 4.2.  He has no urinary symptoms or complaints at this time.      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       PAST SURGICAL HISTORY:   Past Surgical History:   Procedure Laterality Date     BIOPSY       COLONOSCOPY  3/2009     COLONOSCOPY N/A 3/8/2017    Procedure: COMBINED COLONOSCOPY, SINGLE OR MULTIPLE BIOPSY/POLYPECTOMY BY BIOPSY;  Surgeon: Yung Maguire MD;  Location:  GI     COLONOSCOPY Left 3/3/2022    Procedure: COLONOSCOPY, WITH BIOPSY using cold bx forcep;  Surgeon: Yung Maguire MD;  Location:  GI     CYSTOSCOPY, TRANSURETHRAL RESECTION (TUR) PROSTATE, COMBINED N/A 3/24/2021    Procedure: Cystoscopy, transurethral resection of the prostate;  Surgeon: Yosi Campbell MD;  Location:  OR     ENT SURGERY       EXCISE LESION TRUNK N/A 2/3/2017    Procedure: EXCISE LESION TRUNK;  Surgeon: Jaswinder Loepz MD;  Location: [facility]     EXCISE LESION UPPER EXTREMITY Left 2/3/2017    Procedure: EXCISE LESION UPPER EXTREMITY;  Surgeon: Jaswinder Lopez MD;  Location: [facility]     HEAD & NECK  SURGERY       HERNIA REPAIR Left 2002     ORTHOPEDIC SURGERY  2002    torn ligament right shoulder     SOFT TISSUE SURGERY  12/6/10    excisional biopsy of tongue lesion     tonsillectomy  Age 6       FAMILY HISTORY:   Family History   Problem Relation Age of Onset     Cerebrovascular Disease Mother      Hypertension Mother      Respiratory Father         heavy smoker     Cancer Maternal Grandfather         lip cancer--heavy smoker     Unknown/Adopted Son      Unknown/Adopted Daughter        SOCIAL HISTORY:   Social History     Socioeconomic History     Marital status:      Spouse name: None     Number of children: None     Years of education: None     Highest education level: None   Tobacco Use     Smoking status: Former     Packs/day: 0.25     Years: 2.00     Pack years: 0.50     Types: Cigarettes     Quit date: 1978     Years since quittin.1     Smokeless tobacco: Never     Tobacco comments:     smoked socially in college   Substance and Sexual Activity     Alcohol use: Not Currently     Alcohol/week: 0.0 standard drinks     Drug use: No     Sexual activity: Yes     Partners: Female   Other Topics Concern     Parent/sibling w/ CABG, MI or angioplasty before 65F 55M? No       Review Of Systems:  Skin: No rash, pruritis, or skin pigmentation  Eyes: No changes in vision  Ears/Nose/Throat: No changes in hearing, no nosebleeds  Respiratory: No shortness of breath, dyspnea on exertion, cough, or hemoptysis  Cardiovascular: No chest pain or palpitations  Gastrointestinal: No diarrhea or constipation. No abdominal pain. No hematochezia  Genitourinary: see HPI  Musculoskeletal: No pain or swelling of joints, normal range of motion  Neurologic: No weakness or tremors  Psychiatric: No recent changes in memory or mood  Hematologic/Lymphatic/Immunologic: No easy bruising or enlarged lymph nodes  Endocrine: No weight gain or loss      PHYSICAL EXAM:    /80   Pulse 65   Ht 1.829 m (6')   Wt 108.9 kg  (240 lb)   SpO2 97%   BMI 32.55 kg/m      Constitutional: Well developed. Conversant and in no acute distress  Eyes: Anicteric sclera, conjunctiva clear, normal extraocular movements  ENT: Normocephalic and atraumatic,   Skin: Warm and dry. No rashes or lesions  Cardiac: No peripheral edema  Back/Flank: Not done  CNS/PNS: Normal musculature and movements, moves all extremities normally  Respiratory: Normal non-labored breathing  Abdomen: Soft nontender and nondistended  Peripheral Vascular: No peripheral edema  Mental Status/Psych: Alert and Oriented x 3. Normal mood and affect    Penis: Normal  Scrotal skin: Normal, no lesions  Testicles: Normal to palpation bilaterally  Epididymis: Normal to palpation bilaterally  Lymphatic: Normal inguinal lymph nodes    Digital Rectal Exam:     Cystoscopy: I performed flexible cystoscopy and the bladder was normal throughout.  No tumors identified throughout the bladder.  The prostate had no median lobe but obstructing lateral lobes.    Imaging: None    Urinalysis: UA RESULTS:  Recent Labs   Lab Test 02/07/23  0823 10/21/20  0956 09/25/20  1538   COLOR Yellow   < > Yellow   APPEARANCE Clear   < > Clear   URINEGLC Negative   < > Negative   URINEBILI Negative   < > Small*   URINEKETONE Trace*   < > Trace*   SG 1.015   < > 1.020   UBLD Negative   < > Moderate*   URINEPH 6.5   < > 6.0   PROTEIN Negative   < > 100*   UROBILINOGEN 1.0   < > 2.0*   NITRITE Negative   < > Negative   LEUKEST Negative   < > Small*   RBCU  --   --  5-10*   WBCU  --   --  25-50*    < > = values in this interval not displayed.       PSA: 4.2    Post Void Residual:     Other labs: None today      IMPRESSION:  Elevated PSA  Enlarged prostate  History of inverted papilloma of the bladder    PLAN:  His PSA is up a small amount today but it has been higher than this in the past.  It is in the normal range for his age group now.  I recommended that he see Dr. Rodríguez next year for his annual physical and PSA and  then he should come back and see me again in 2 years for another PSA and office cystoscopy.      Yosi Campbell M.D.

## 2023-02-07 NOTE — PATIENT INSTRUCTIONS

## 2023-02-07 NOTE — LETTER
2/7/2023       RE: Chrystal Solis  [address] [place] [address]     Dear Colleague,    Thank you for referring your patient, Chrystal Solis, to the [facility] at [facility]. Please see a copy of my visit note below.    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  His PSA today is back up a small amount at 4.2.  He has no urinary symptoms or complaints at this time.      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       PAST SURGICAL HISTORY:   Past Surgical History:   Procedure Laterality Date     BIOPSY       COLONOSCOPY  3/2009     COLONOSCOPY N/A 3/8/2017    Procedure: COMBINED COLONOSCOPY, SINGLE OR MULTIPLE BIOPSY/POLYPECTOMY BY BIOPSY;  Surgeon: Yung Maguire MD;  Location:  GI     COLONOSCOPY Left 3/3/2022    Procedure: COLONOSCOPY, WITH BIOPSY using cold bx forcep;  Surgeon: Yung Maguire MD;  Location:  GI     CYSTOSCOPY, TRANSURETHRAL RESECTION (TUR) PROSTATE, COMBINED N/A 3/24/2021    Procedure: Cystoscopy, transurethral resection of the prostate;  Surgeon: Yosi Campbell MD;  Location:   OR     ENT SURGERY       EXCISE LESION TRUNK N/A 2/3/2017    Procedure: EXCISE LESION TRUNK;  Surgeon: Jaswinder Lopez MD;  Location: [facility]     EXCISE LESION UPPER EXTREMITY Left 2/3/2017    Procedure: EXCISE LESION UPPER EXTREMITY;  Surgeon: Jaswinder Lopez MD;  Location: [facility]     HEAD & NECK SURGERY       HERNIA REPAIR Left      ORTHOPEDIC SURGERY  2002    torn ligament right shoulder     SOFT TISSUE SURGERY  12/6/10    excisional biopsy of tongue lesion     tonsillectomy  Age 6       FAMILY HISTORY:   Family History   Problem Relation Age of Onset     Cerebrovascular Disease Mother      Hypertension Mother      Respiratory Father         heavy smoker     Cancer Maternal Grandfather         lip cancer--heavy smoker     Unknown/Adopted Son      Unknown/Adopted Daughter        SOCIAL HISTORY:   Social History     Socioeconomic History     Marital status:      Spouse name: None     Number of children: None     Years of education: None     Highest education level: None   Tobacco Use     Smoking status: Former     Packs/day: 0.25     Years: 2.00     Pack years: 0.50     Types: Cigarettes     Quit date: 1978     Years since quittin.1     Smokeless tobacco: Never     Tobacco comments:     smoked socially in college   Substance and Sexual Activity     Alcohol use: Not Currently     Alcohol/week: 0.0 standard drinks     Drug use: No     Sexual activity: Yes     Partners: Female   Other Topics Concern     Parent/sibling w/ CABG, MI or angioplasty before 65F 55M? No       Review Of Systems:  Skin: No rash, pruritis, or skin pigmentation  Eyes: No changes in vision  Ears/Nose/Throat: No changes in hearing, no nosebleeds  Respiratory: No shortness of breath, dyspnea on exertion, cough, or hemoptysis  Cardiovascular: No chest pain or palpitations  Gastrointestinal: No diarrhea or constipation. No abdominal pain. No hematochezia  Genitourinary: see HPI  Musculoskeletal: No pain or swelling of  joints, normal range of motion  Neurologic: No weakness or tremors  Psychiatric: No recent changes in memory or mood  Hematologic/Lymphatic/Immunologic: No easy bruising or enlarged lymph nodes  Endocrine: No weight gain or loss      PHYSICAL EXAM:    /80   Pulse 65   Ht 1.829 m (6')   Wt 108.9 kg (240 lb)   SpO2 97%   BMI 32.55 kg/m      Constitutional: Well developed. Conversant and in no acute distress  Eyes: Anicteric sclera, conjunctiva clear, normal extraocular movements  ENT: Normocephalic and atraumatic,   Skin: Warm and dry. No rashes or lesions  Cardiac: No peripheral edema  Back/Flank: Not done  CNS/PNS: Normal musculature and movements, moves all extremities normally  Respiratory: Normal non-labored breathing  Abdomen: Soft nontender and nondistended  Peripheral Vascular: No peripheral edema  Mental Status/Psych: Alert and Oriented x 3. Normal mood and affect    Penis: Normal  Scrotal skin: Normal, no lesions  Testicles: Normal to palpation bilaterally  Epididymis: Normal to palpation bilaterally  Lymphatic: Normal inguinal lymph nodes    Digital Rectal Exam:     Cystoscopy: I performed flexible cystoscopy and the bladder was normal throughout.  No tumors identified throughout the bladder.  The prostate had no median lobe but obstructing lateral lobes.    Imaging: None    Urinalysis: UA RESULTS:  Recent Labs   Lab Test 02/07/23  0823 10/21/20  0956 09/25/20  1538   COLOR Yellow   < > Yellow   APPEARANCE Clear   < > Clear   URINEGLC Negative   < > Negative   URINEBILI Negative   < > Small*   URINEKETONE Trace*   < > Trace*   SG 1.015   < > 1.020   UBLD Negative   < > Moderate*   URINEPH 6.5   < > 6.0   PROTEIN Negative   < > 100*   UROBILINOGEN 1.0   < > 2.0*   NITRITE Negative   < > Negative   LEUKEST Negative   < > Small*   RBCU  --   --  5-10*   WBCU  --   --  25-50*    < > = values in this interval not displayed.       PSA: 4.2    Post Void Residual:     Other labs: None  today      IMPRESSION:  Elevated PSA  Enlarged prostate  History of inverted papilloma of the bladder    PLAN:  His PSA is up a small amount today but it has been higher than this in the past.  It is in the normal range for his age group now.  I recommended that he see Dr. Rodríguez next year for his annual physical and PSA and then he should come back and see me again in 2 years for another PSA and office cystoscopy.      Yosi Campbell M.D.

## 2023-02-07 NOTE — NURSING NOTE
Chief Complaint   Patient presents with     history of bladder cancer     In office cystoscopy   Prior to the start of the procedure and with procedural staff participation, I verbally confirmed the patient s identity using two indicators, relevant allergies, that the procedure was appropriate and matched the consent or emergent situation, and that the correct equipment/implants were available. Immediately prior to starting the procedure I conducted the Time Out with the procedural staff and re-confirmed the patient s name, procedure, and site/side. I have wiped the patient off with the povidone-Iodine solution, draped them,  used Lidocaine hydrochloride jelly, and instilled sterile water into the bladder. (The Joint Commission universal protocol was followed.)  Yes    Sedation (Moderate or Deep): None  5mL 2% lidocaine hydrochloride Urojet instilled into urethra.    NDC# [address]-1  Lot #: [id number]  Expiration Date:  8-24  Pauline Abraham LPN

## 2023-02-07 NOTE — NURSING NOTE
Chief Complaint   Patient presents with     history of bladder cancer     In office cystoscopy   Prior to the start of the procedure and with DR FOREMAN procedural staff participation, I verbally confirmed the patient s identity using two indicators, relevant allergies, that the procedure was appropriate and matched the consent or emergent situation, and that the correct equipment/implants were available. Immediately prior to starting the procedure I conducted the Time Out with the procedural staff and re-confirmed the patient s name, procedure, and site/side. I have wiped the patient off with the povidone-Iodine solution, draped them,  used Lidocaine hydrochloride jelly, and instilled sterile water into the bladder. (The Joint Commission universal protocol was followed.)  Yes    Sedation (Moderate or Deep): None 5mL 2% lidocaine hydrochloride Urojet instilled into urethra.    NDC# [address]-3  Lot #: [id number]  Expiration Date: 12-23

## 2023-04-02 ENCOUNTER — HEALTH MAINTENANCE LETTER (OUTPATIENT)
Age: 73
End: 2023-04-02

## 2023-04-04 DIAGNOSIS — I10 BENIGN ESSENTIAL HYPERTENSION: ICD-10-CM

## 2023-04-05 RX ORDER — IRBESARTAN 300 MG/1
TABLET ORAL
Qty: 90 TABLET | Refills: 3 | Status: SHIPPED | OUTPATIENT
Start: 2023-04-05 | End: 2023-06-28

## 2023-05-04 DIAGNOSIS — E78.5 HYPERLIPIDEMIA LDL GOAL <100: Primary | ICD-10-CM

## 2023-05-04 DIAGNOSIS — I10 BENIGN ESSENTIAL HYPERTENSION: ICD-10-CM

## 2023-05-04 RX ORDER — AMLODIPINE BESYLATE 10 MG/1
TABLET ORAL
Qty: 90 TABLET | Refills: 0 | Status: SHIPPED | OUTPATIENT
Start: 2023-05-04 | End: 2023-06-28

## 2023-05-04 RX ORDER — ATORVASTATIN CALCIUM 10 MG/1
TABLET, FILM COATED ORAL
Qty: 90 TABLET | Refills: 0 | Status: SHIPPED | OUTPATIENT
Start: 2023-05-04 | End: 2023-06-28

## 2023-05-24 ENCOUNTER — TELEPHONE (OUTPATIENT)
Dept: FAMILY MEDICINE | Facility: CLINIC | Age: 73
End: 2023-05-24
Payer: COMMERCIAL

## 2023-06-28 ENCOUNTER — OFFICE VISIT (OUTPATIENT)
Dept: FAMILY MEDICINE | Facility: CLINIC | Age: 73
End: 2023-06-28
Payer: COMMERCIAL

## 2023-06-28 VITALS
TEMPERATURE: 97.7 F | SYSTOLIC BLOOD PRESSURE: 130 MMHG | BODY MASS INDEX: 31.26 KG/M2 | OXYGEN SATURATION: 96 % | WEIGHT: 230.8 LBS | HEART RATE: 67 BPM | RESPIRATION RATE: 18 BRPM | HEIGHT: 72 IN | DIASTOLIC BLOOD PRESSURE: 75 MMHG

## 2023-06-28 DIAGNOSIS — Z23 NEED FOR VACCINATION: ICD-10-CM

## 2023-06-28 DIAGNOSIS — E78.5 HYPERLIPIDEMIA LDL GOAL <100: ICD-10-CM

## 2023-06-28 DIAGNOSIS — G62.9 PERIPHERAL POLYNEUROPATHY: ICD-10-CM

## 2023-06-28 DIAGNOSIS — I10 BENIGN ESSENTIAL HYPERTENSION: ICD-10-CM

## 2023-06-28 DIAGNOSIS — C02.9 SQUAMOUS CELL CARCINOMA OF TONGUE (H): ICD-10-CM

## 2023-06-28 DIAGNOSIS — H04.203 WATERY EYES: ICD-10-CM

## 2023-06-28 DIAGNOSIS — R97.20 ELEVATED PROSTATE SPECIFIC ANTIGEN (PSA): ICD-10-CM

## 2023-06-28 DIAGNOSIS — Z00.00 ROUTINE GENERAL MEDICAL EXAMINATION AT A HEALTH CARE FACILITY: Primary | ICD-10-CM

## 2023-06-28 LAB
ERYTHROCYTE [DISTWIDTH] IN BLOOD BY AUTOMATED COUNT: 12.3 % (ref 10–15)
HCT VFR BLD AUTO: 51.8 % (ref 40–53)
HGB BLD-MCNC: 17.9 G/DL (ref 13.3–17.7)
MCH RBC QN AUTO: 29.7 PG (ref 26.5–33)
MCHC RBC AUTO-ENTMCNC: 34.6 G/DL (ref 31.5–36.5)
MCV RBC AUTO: 86 FL (ref 78–100)
PLATELET # BLD AUTO: 208 10E3/UL (ref 150–450)
RBC # BLD AUTO: 6.03 10E6/UL (ref 4.4–5.9)
WBC # BLD AUTO: 6.1 10E3/UL (ref 4–11)

## 2023-06-28 PROCEDURE — 80053 COMPREHEN METABOLIC PANEL: CPT | Performed by: INTERNAL MEDICINE

## 2023-06-28 PROCEDURE — 99214 OFFICE O/P EST MOD 30 MIN: CPT | Mod: 25 | Performed by: INTERNAL MEDICINE

## 2023-06-28 PROCEDURE — 90471 IMMUNIZATION ADMIN: CPT | Performed by: INTERNAL MEDICINE

## 2023-06-28 PROCEDURE — 90714 TD VACC NO PRESV 7 YRS+ IM: CPT | Performed by: INTERNAL MEDICINE

## 2023-06-28 PROCEDURE — 36415 COLL VENOUS BLD VENIPUNCTURE: CPT | Performed by: INTERNAL MEDICINE

## 2023-06-28 PROCEDURE — G0439 PPPS, SUBSEQ VISIT: HCPCS | Performed by: INTERNAL MEDICINE

## 2023-06-28 PROCEDURE — 80061 LIPID PANEL: CPT | Performed by: INTERNAL MEDICINE

## 2023-06-28 PROCEDURE — 85027 COMPLETE CBC AUTOMATED: CPT | Performed by: INTERNAL MEDICINE

## 2023-06-28 PROCEDURE — 84153 ASSAY OF PSA TOTAL: CPT | Performed by: INTERNAL MEDICINE

## 2023-06-28 RX ORDER — ATORVASTATIN CALCIUM 10 MG/1
10 TABLET, FILM COATED ORAL DAILY
Qty: 90 TABLET | Refills: 3 | Status: SHIPPED | OUTPATIENT
Start: 2023-06-28 | End: 2024-07-30

## 2023-06-28 RX ORDER — HYDROCHLOROTHIAZIDE 25 MG/1
TABLET ORAL
Qty: 90 TABLET | Refills: 3 | Status: SHIPPED | OUTPATIENT
Start: 2023-06-28 | End: 2024-07-11

## 2023-06-28 RX ORDER — IRBESARTAN 300 MG/1
300 TABLET ORAL DAILY
Qty: 90 TABLET | Refills: 3 | Status: SHIPPED | OUTPATIENT
Start: 2023-06-28 | End: 2024-07-30

## 2023-06-28 RX ORDER — AMLODIPINE BESYLATE 10 MG/1
10 TABLET ORAL DAILY
Qty: 90 TABLET | Refills: 3 | Status: SHIPPED | OUTPATIENT
Start: 2023-06-28 | End: 2024-07-30

## 2023-06-28 ASSESSMENT — ENCOUNTER SYMPTOMS
SORE THROAT: 0
FEVER: 0
JOINT SWELLING: 0
ARTHRALGIAS: 1
HEADACHES: 0
NAUSEA: 0
DYSURIA: 0
DIZZINESS: 0
NERVOUS/ANXIOUS: 1
PARESTHESIAS: 0
HEMATURIA: 0
FREQUENCY: 0
MYALGIAS: 0
CHILLS: 0
HEMATOCHEZIA: 0
HEARTBURN: 0
PALPITATIONS: 0
DIARRHEA: 0
SHORTNESS OF BREATH: 0
EYE PAIN: 0
WEAKNESS: 0
ABDOMINAL PAIN: 0
CONSTIPATION: 0
COUGH: 0

## 2023-06-28 ASSESSMENT — ACTIVITIES OF DAILY LIVING (ADL): CURRENT_FUNCTION: NO ASSISTANCE NEEDED

## 2023-06-28 ASSESSMENT — PAIN SCALES - GENERAL: PAINLEVEL: NO PAIN (0)

## 2023-06-28 NOTE — PATIENT INSTRUCTIONS
Get the TDAP vaccine at a pharmacy when you can.      Consider duloxetine (Cymbalta) to help with neuropathy and it helps with anxiety or feeling depressed.

## 2023-06-28 NOTE — NURSING NOTE
Prior to immunization administration, verified patients identity using patient s name and date of birth. Please see Immunization Activity for additional information.     Screening Questionnaire for Adult Immunization    Are you sick today?   No   Do you have allergies to medications, food, a vaccine component or latex?   No   Have you ever had a serious reaction after receiving a vaccination?   No   Do you have a long-term health problem with heart, lung, kidney, or metabolic disease (e.g., diabetes), asthma, a blood disorder, no spleen, complement component deficiency, a cochlear implant, or a spinal fluid leak?  Are you on long-term aspirin therapy?   No   Do you have cancer, leukemia, HIV/AIDS, or any other immune system problem?   No   Do you have a parent, brother, or sister with an immune system problem?   No   In the past 3 months, have you taken medications that affect  your immune system, such as prednisone, other steroids, or anticancer drugs; drugs for the treatment of rheumatoid arthritis, Crohn s disease, or psoriasis; or have you had radiation treatments?   No   Have you had a seizure, or a brain or other nervous system problem?   No   During the past year, have you received a transfusion of blood or blood    products, or been given immune (gamma) globulin or antiviral drug?   No   For women: Are you pregnant or is there a chance you could become       pregnant during the next month?   No   Have you received any vaccinations in the past 4 weeks?   No     Immunization questionnaire answers were all negative.    Patient received Td.        Patient instructed to remain in clinic for 15 minutes afterwards, and to report any adverse reactions.     Screening performed by August Murillo MA on 6/28/2023 at 1:48 PM.

## 2023-06-28 NOTE — PROGRESS NOTES
"SUBJECTIVE:   Chrystal is a 72 year old who presents for Preventive Visit.       No data to display              Are you in the first 12 months of your Medicare coverage?  No    Healthy Habits:     In general, how would you rate your overall health?  Good    Frequency of exercise:  4-5 days/week    Duration of exercise:  30-45 minutes    Do you usually eat at least 4 servings of fruit and vegetables a day, include whole grains    & fiber and avoid regularly eating high fat or \"junk\" foods?  Yes    Taking medications regularly:  Yes    Medication side effects:  None    Ability to successfully perform activities of daily living:  No assistance needed    Home Safety:  Lack of grab bars in the bathroom    Hearing Impairment:  Difficulty following a conversation in a noisy restaurant or crowded room, feel that people are mumbling or not speaking clearly, difficulty following dialogue in the theater, difficult to understand a speaker at a public meeting or [employer] service, need to ask people to speak up or repeat themselves, find that men's voices are easier to understand than woman's, difficulty understanding soft or whispered speech and difficulty understanding speech on the telephone    In the past 6 months, have you been bothered by leaking of urine?  No    In general, how would you rate your overall mental or emotional health?  Fair      PHQ-2 Total Score: 0    Additional concerns today:  No        Have you ever done Advance Care Planning? (For example, a Health Directive, POLST, or a discussion with a medical provider or your loved ones about your wishes): No, advance care planning information given to patient to review.  Patient declined advance care planning discussion at this time.       Fall risk  Fallen 2 or more times in the past year?: No  Any fall with injury in the past year?: No  click delete button to remove this line now  Cognitive Screening   1) Repeat 3 items (Leader, Season, Table)    2) Clock draw: " Patient declined do the clock draw  3) 3 item recall: Recalls NO objects   Results: Patient declined to take the Minicog screening.    Mini-CogTM Copyright CASSANDRA Smith. Licensed by the author for use in [facility]; reprinted with permission (josefa@[facility]). All rights reserved.      Do you have sleep apnea, excessive snoring or daytime drowsiness?: no    Reviewed and updated as needed this visit by clinical staff   Tobacco  Allergies  Meds   Med Hx  Surg Hx  Fam Hx          Reviewed and updated as needed this visit by Provider   Tobacco     Med Hx  Surg Hx  Fam Hx         Social History     Tobacco Use     Smoking status: Former     Packs/day: 0.25     Years: 2.00     Pack years: 0.50     Types: Cigarettes     Quit date: 1978     Years since quittin.5     Smokeless tobacco: Never     Tobacco comments:     smoked socially in college   Substance Use Topics     Alcohol use: Not Currently     Alcohol/week: 0.0 standard drinks of alcohol             2023     1:03 PM   Alcohol Use   Prescreen: >3 drinks/day or >7 drinks/week? No     Do you have a current opioid prescription? No  Do you use any other controlled substances or medications that are not prescribed by a provider? None      Current providers sharing in care for this patient include:   Patient Care Team:  Lam Rodríguez MD as PCP - General (Internal Medicine)  Lam Rodríguez MD as Assigned PCP  Yosi Campbell MD as Assigned Surgical Provider    The following health maintenance items are reviewed in Epic and correct as of today:  Health Maintenance   Topic Date Due     DTAP/TDAP/TD IMMUNIZATION (2 - Td or Tdap) 2022     MEDICARE ANNUAL WELLNESS VISIT  2023     COVID-19 Vaccine (5 - Pfizer series) 2023     ANNUAL REVIEW OF HM ORDERS  2024     FALL RISK ASSESSMENT  2024     LIPID  2027     COLORECTAL CANCER SCREENING  2027     ADVANCE CARE PLANNING  2028     HEPATITIS C  SCREENING  Completed     PHQ-2 (once per calendar year)  Completed     INFLUENZA VACCINE  Completed     Pneumococcal Vaccine: 65+ Years  Completed     ZOSTER IMMUNIZATION  Completed     AORTIC ANEURYSM SCREENING (SYSTEM ASSIGNED)  Completed     IPV IMMUNIZATION  Aged Out     MENINGITIS IMMUNIZATION  Aged Out     Patient Active Problem List   Diagnosis     Squamous cell carcinoma of tongue (H)     Benign essential hypertension     Hyperlipidemia LDL goal <100     Peripheral polyneuropathy     Advanced directives, counseling/discussion     Elevated prostate specific antigen (PSA)     Past Surgical History:   Procedure Laterality Date     BIOPSY       COLONOSCOPY  3/2009     COLONOSCOPY N/A 3/8/2017    Procedure: COMBINED COLONOSCOPY, SINGLE OR MULTIPLE BIOPSY/POLYPECTOMY BY BIOPSY;  Surgeon: Yung Maguire MD;  Location:  GI     COLONOSCOPY Left 3/3/2022    Procedure: COLONOSCOPY, WITH BIOPSY using cold bx forcep;  Surgeon: Yung Maguire MD;  Location:  GI     CYSTOSCOPY, TRANSURETHRAL RESECTION (TUR) PROSTATE, COMBINED N/A 3/24/2021    Procedure: Cystoscopy, transurethral resection of the prostate;  Surgeon: Yosi Campbell MD;  Location:  OR     ENT SURGERY       EXCISE LESION TRUNK N/A 2/3/2017    Procedure: EXCISE LESION TRUNK;  Surgeon: Jaswinder Lopez MD;  Location: [facility]     EXCISE LESION UPPER EXTREMITY Left 2/3/2017    Procedure: EXCISE LESION UPPER EXTREMITY;  Surgeon: Jaswinder Lopez MD;  Location: [facility]     HEAD & NECK SURGERY       HERNIA REPAIR Left      ORTHOPEDIC SURGERY  2002    torn ligament right shoulder     SOFT TISSUE SURGERY  12/6/10    excisional biopsy of tongue lesion     tonsillectomy  Age 6       Social History     Tobacco Use     Smoking status: Former     Packs/day: 0.25     Years: 2.00     Pack years: 0.50     Types: Cigarettes     Quit date: 1978     Years since quittin.5     Smokeless tobacco: Never     Tobacco comments:     smoked  socially in college   Substance Use Topics     Alcohol use: Not Currently     Alcohol/week: 0.0 standard drinks of alcohol     Family History   Problem Relation Age of Onset     Cerebrovascular Disease Mother      Hypertension Mother      Respiratory Father         heavy smoker     Cancer Maternal Grandfather         lip cancer--heavy smoker     Unknown/Adopted Son      Unknown/Adopted Daughter          Current Outpatient Medications   Medication Sig Dispense Refill     amLODIPine (NORVASC) 10 MG tablet Take 1 tablet (10 mg) by mouth daily 90 tablet 3     atorvastatin (LIPITOR) 10 MG tablet Take 1 tablet (10 mg) by mouth daily 90 tablet 3     hydrochlorothiazide (HYDRODIURIL) 25 MG tablet TAKE ONE TABLET DAILY AS NEEDED 90 tablet 3     irbesartan (AVAPRO) 300 MG tablet Take 1 tablet (300 mg) by mouth daily 90 tablet 3     No Known Allergies          Review of Systems   Constitutional: Negative for chills and fever.   HENT: Positive for hearing loss. Negative for congestion, ear pain and sore throat.    Eyes: Negative for pain and visual disturbance.   Respiratory: Negative for cough and shortness of breath.    Cardiovascular: Negative for chest pain, palpitations and peripheral edema.   Gastrointestinal: Negative for abdominal pain, constipation, diarrhea, heartburn, hematochezia and nausea.   Genitourinary: Negative for dysuria, frequency, genital sores, hematuria, impotence, penile discharge and urgency.   Musculoskeletal: Positive for arthralgias. Negative for joint swelling and myalgias.   Skin: Negative for rash.   Neurological: Negative for dizziness, weakness, headaches and paresthesias.   Psychiatric/Behavioral: Negative for mood changes. The patient is nervous/anxious.    plantar fascia pain improving with stretching and footwear  He does not want to take a medication or see a therapist for mood, discussed cymbalta since he also has neuropathy , he will consider it  He has a watery right eye and elevated  "eye pressures, he has tried over the counter drops       OBJECTIVE:   /75 (BP Location: Left arm, Patient Position: Sitting, Cuff Size: Adult Large)   Pulse 67   Temp 97.7  F (36.5  C) (Temporal)   Resp 18   Ht 1.822 m (5' 11.75\")   Wt 104.7 kg (230 lb 12.8 oz)   SpO2 96%   BMI 31.52 kg/m   Estimated body mass index is 31.52 kg/m  as calculated from the following:    Height as of this encounter: 1.822 m (5' 11.75\").    Weight as of this encounter: 104.7 kg (230 lb 12.8 oz).  Physical Exam  GENERAL: healthy, alert and no distress  EYES: Eyes grossly normal to inspection, PERRL and conjunctivae and sclerae normal  HENT: ear canals and TM's normal, nose and mouth without ulcers or lesions  NECK: no adenopathy, no asymmetry, masses, or scars and thyroid normal to palpation  RESP: lungs clear to auscultation - no rales, rhonchi or wheezes  CV: regular rate and rhythm, normal S1 S2, no S3 or S4, no murmur, click or rub, no peripheral edema and peripheral pulses strong  ABDOMEN: soft, nontender, no hepatosplenomegaly, no masses and bowel sounds normal  MS: no gross musculoskeletal defects noted, no edema  SKIN: no suspicious lesions or rashes  NEURO: Normal strength and tone, mentation intact and speech normal  PSYCH: mentation appears normal, affect normal/bright        ASSESSMENT / PLAN:       ICD-10-CM    1. Routine general medical examination at a health care facility  Z00.00 TD, PF, 7+YRS (TENIVAC/DECAVAC)      2. Benign essential hypertension  I10 amLODIPine (NORVASC) 10 MG tablet     hydrochlorothiazide (HYDRODIURIL) 25 MG tablet     irbesartan (AVAPRO) 300 MG tablet      3. Hyperlipidemia LDL goal <100  E78.5 Lipid panel reflex to direct LDL Fasting     Comprehensive metabolic panel     CBC with platelets     atorvastatin (LIPITOR) 10 MG tablet      4. Peripheral polyneuropathy  G62.9       5. Squamous cell carcinoma of tongue (H)  C02.9       6. Elevated prostate specific antigen (PSA)  R97.20 PSA " "tumor marker      7. Watery eyes  H04.203 Adult Eye  Referral      8. Need for vaccination  Z23 TD, PF, 7+YRS (TENIVAC/DECAVAC)        Blood pressure OK  On statin therapy recheck lipids  Neuropathy OK, discussed duloxetine, he will consider   Continue follow up with ENT at [facility] history of cancer  He sees Yosi Campbell about PSA, recheck today as it was trending up last time it was checked  Referred to [facility] re watery eyes   Patient has been advised of split billing requirements and indicates understanding: Yes      COUNSELING:  Reviewed preventive health counseling, as reflected in patient instructions  Special attention given to:       Consider AAA screening for ages 65-75 and smoking history       Regular exercise       Healthy diet/nutrition       Immunizations    Due tetanus booster; discussed.  Recommended COVID booster too.            Consider lung cancer screening for ages 55-80 years (77 for Medicare) and 20 pack-year smoking history ; he quit 45 years ago        Colon cancer screening; repeat 3/2027       Prostate cancer screening; PSA today       BMI:   Estimated body mass index is 31.52 kg/m  as calculated from the following:    Height as of this encounter: 1.822 m (5' 11.75\").    Weight as of this encounter: 104.7 kg (230 lb 12.8 oz).   Weight management plan: Discussed healthy diet and exercise guidelines      He reports that he quit smoking about 45 years ago. His smoking use included cigarettes. He has a 0.50 pack-year smoking history. He has never used smokeless tobacco.      Appropriate preventive services were discussed with this patient, including applicable screening as appropriate for cardiovascular disease, diabetes, osteopenia/osteoporosis, and glaucoma.  As appropriate for age/gender, discussed screening for colorectal cancer, prostate cancer, breast cancer, and cervical cancer. Checklist reviewing preventive services available has been given to the " patient.    Reviewed patients plan of care and provided an AVS. The Basic Care Plan (routine screening as documented in Health Maintenance) for Chrystal meets the Care Plan requirement. This Care Plan has been established and reviewed with the Patient.      Lam Rodríguez MD  [facility]    Identified Health Risks:    I have reviewed Opioid Use Disorder and Substance Use Disorder risk factors and made any needed referrals.

## 2023-06-29 LAB
ALBUMIN SERPL BCG-MCNC: 4.9 G/DL (ref 3.5–5.2)
ALP SERPL-CCNC: 81 U/L (ref 40–129)
ALT SERPL W P-5'-P-CCNC: 39 U/L (ref 0–70)
ANION GAP SERPL CALCULATED.3IONS-SCNC: 14 MMOL/L (ref 7–15)
AST SERPL W P-5'-P-CCNC: 30 U/L (ref 0–45)
BILIRUB SERPL-MCNC: 1.1 MG/DL
BUN SERPL-MCNC: 12.1 MG/DL (ref 8–23)
CALCIUM SERPL-MCNC: 10 MG/DL (ref 8.8–10.2)
CHLORIDE SERPL-SCNC: 100 MMOL/L (ref 98–107)
CHOLEST SERPL-MCNC: 171 MG/DL
CREAT SERPL-MCNC: 0.81 MG/DL (ref 0.67–1.17)
DEPRECATED HCO3 PLAS-SCNC: 23 MMOL/L (ref 22–29)
GFR SERPL CREATININE-BSD FRML MDRD: >90 ML/MIN/1.73M2
GLUCOSE SERPL-MCNC: 101 MG/DL (ref 70–99)
HDLC SERPL-MCNC: 35 MG/DL
LDLC SERPL CALC-MCNC: 101 MG/DL
NONHDLC SERPL-MCNC: 136 MG/DL
POTASSIUM SERPL-SCNC: 4.1 MMOL/L (ref 3.4–5.3)
PROT SERPL-MCNC: 7 G/DL (ref 6.4–8.3)
PSA SERPL DL<=0.01 NG/ML-MCNC: 3.28 NG/ML (ref 0–6.5)
SODIUM SERPL-SCNC: 137 MMOL/L (ref 136–145)
TRIGL SERPL-MCNC: 177 MG/DL

## 2023-06-29 NOTE — RESULT ENCOUNTER NOTE
The following letter pertains to your most recent diagnostic tests:    The cholesterol is up a bit since last check.  Make sure you are not missing any doses of atorvastatin (Lipitor).  If you are taking the atorvastatin (Lipitor) faithfully, you could benefit from increasing the dose from 10 mg daily to 20 mg daily to provide better protection from blood vessel disease.  Let me know if that is the case, and we can send an prescription for 20 mg tablets to your pharmacy.       The metabolic panel looks healthy.    Your prostate specific antigen (PSA) test result returned normal and improved from last check.    Your complete blood counts including your hemoglobin returned normal for you.           Sincerely,    Dr. Rodríguez

## 2023-07-26 ENCOUNTER — TRANSFERRED RECORDS (OUTPATIENT)
Dept: HEALTH INFORMATION MANAGEMENT | Facility: CLINIC | Age: 73
End: 2023-07-26
Payer: COMMERCIAL

## 2024-02-16 ENCOUNTER — PATIENT OUTREACH (OUTPATIENT)
Dept: GASTROENTEROLOGY | Facility: CLINIC | Age: 74
End: 2024-02-16
Payer: COMMERCIAL

## 2024-05-29 ENCOUNTER — PATIENT OUTREACH (OUTPATIENT)
Dept: CARE COORDINATION | Facility: CLINIC | Age: 74
End: 2024-05-29
Payer: COMMERCIAL

## 2024-06-12 ENCOUNTER — PATIENT OUTREACH (OUTPATIENT)
Dept: CARE COORDINATION | Facility: CLINIC | Age: 74
End: 2024-06-12
Payer: COMMERCIAL

## 2024-06-14 ENCOUNTER — TELEPHONE (OUTPATIENT)
Dept: FAMILY MEDICINE | Facility: CLINIC | Age: 74
End: 2024-06-14
Payer: COMMERCIAL

## 2024-06-14 NOTE — TELEPHONE ENCOUNTER
"Reason for Call:  Appointment Request - AWV  Preventive     Requested provider: Lam Rodríguez    Reason patient unable to be scheduled:   Needs to be scheduled by clinic    When does patient want to be seen/preferred time: Next 2-3 months.     ACTION ITEMS:     Annual Wellness Visit   Schedule in next 2-3 months. BP is creeping up. Otherwise feels healthy.     Regular Rx's  Make sure Rx's continue to be filled. Rx's written to end of June 2024.   Last AWV 6/28/2023.    Prednisone cream Rx  [place].  He would be interested in getting a prednisone Rx \"just in case\" he gets into a patch.     Could we send this information to you in [place] or would you prefer to receive a phone call?:     Patient would prefer a phone call     Okay to leave a detailed message?: Yes at Cell number on file:    Telephone Information:   Mobile [phone]     Call taken on 6/14/2024 at 12:55 PM by Gia Montemayor  "

## 2024-06-17 PROBLEM — Z71.89 ADVANCED DIRECTIVES, COUNSELING/DISCUSSION: Status: RESOLVED | Noted: 2017-12-18 | Resolved: 2024-06-17

## 2024-06-18 NOTE — TELEPHONE ENCOUNTER
Date: 2020     Patient: Scott Murphy    : 1945   [id number]     CC:  Screening Colonoscopy, with personal history of colon polyps    History:   The patient is a 74 y.o. male of Mariah Delgado MD  who presents to discuss screening colonoscopy with personal history of colon polyps, last colonoscopy was . The patient currently has no complaints.  Patient denies any history of nausea, abdominal pain, weight loss, change in bowel habits or rectal bleeding.  Patient denies melena, hematochezia or BRBPR.  No family history of ulcerative colitis, Crohn's disease or familial polyposis.    The following portions of the patient's history were reviewed and updated as appropriate: allergies, current medications, past family history, past medical history, past social history, past surgical history and problem list.    Past Medical History:   Diagnosis Date   • Acquired hypothyroidism    • Anxiety    • Aortic aneurysm (CMS/HCC)    • Arthritis    • Asthma    • Baker's cyst of knee, right    • Disease of thyroid gland    • Gout    • H/O Muscle pain     Right shoulder and neck area   • H/O Radiation treatment     For tonsillar inflammation and infection when he was a child and this led him to develop thyroid cancer.   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 Ganesh Lopez   • Left shoulder pain    • Myeloproliferative disorder (CMS/HCC)     With polycythemia vera, JAK2 mutation positive requiring periodic phlebotomies (hematocrit above 47).   • Polymyalgia rheumatica (CMS/HCC)    • Premature atrial contraction    • Prostate cancer (CMS/HCC)    • Raynaud disease    • Shoulder injury, left, sequela       Past Surgical History:   Procedure Laterality Date   • CIRCUMCISION N/A 2018    Procedure: CIRCUMCISION;   Patient is scheduled 7/30/2024   Surgeon: Gallo Lopez MD;  Location: [facility];  Service: Urology   • COLONOSCOPY  2010    Documented a tubulovillous adenom that was removed completely. Colonoscopy in 2014 was unremarkable.   • HERNIA REPAIR      H/O multiple hernia repairs including right inguinal hernia repair in 1981, 2003, and double hernia repair in 2013   • KIDNEY SURGERY     • PROSTATE BIOPSY      Showed features consistent with prostate cancer   • SHOULDER SURGERY  1995    Shoulder repair   • THYROIDECTOMY, PARTIAL  1983    For malignant tumor   • TOOTH EXTRACTION       Medications:   Medications Prior to Admission   Medication Sig Dispense Refill Last Dose   • amLODIPine (NORVASC) 5 MG tablet Take 10 mg by mouth Daily.   2/20/2020 at 0630   • B Complex Vitamins (VITAMIN B-COMPLEX PO) Take 1 tablet by mouth Daily.   2/19/2020 at Unknown time   • levothyroxine (SYNTHROID, LEVOTHROID) 100 MCG tablet Take 1 tablet by mouth daily. 30 tablet 6 2/19/2020 at Unknown time   • predniSONE (DELTASONE) 2.5 MG tablet Take 2.5 mg by mouth Daily.   2/18/2020   • guaifenesin-codeine (GUAIFENESIN AC) 100-10 MG/5ML liquid 5 ML EVERY 8 HOURS AS NEEDED FOR COUGH 120 mL 0 More than a month at Unknown time   • montelukast (SINGULAIR) 10 MG tablet Take 10 mg by mouth Every Night.   2/18/2020   • zolpidem (AMBIEN) 10 MG tablet TAKE ONE-HALF TO ONE TABLET BY MOUTH ONCE NIGHTLY AS NEEDED FOR SLEEP  **MUST LAST 30 DAYS   2/18/2020     Allergies: Diphenhydramine; Statins; Aspirin; Latex; Peanut oil; and Shellfish-derived products   Social History:  Social History     Socioeconomic History   • Marital status:      Spouse name: Not on file   • Number of children: Not on file   • Years of education: College   • Highest education level: Not on file   Occupational History     Employer: RETIRED     Comment: Corporate financial work   Tobacco Use   • Smoking status: Never Smoker   • Smokeless tobacco: Never Used   Substance and Sexual Activity   • Alcohol  use: Yes     Alcohol/week: 1.0 - 3.0 standard drinks     Types: 1 - 3 Glasses of wine per week     Comment: 1-3 glasses of wine a week   • Drug use: No   • Sexual activity: Defer   Social History Narrative    He is a very avid  and plays violin in an orchestra. He does not smoke but had a lot of second-hand smoke through his life.      Family History   Problem Relation Age of Onset   • No Known Problems Mother    • Heart attack Father    • Heart disease Father    • Hypertension Father    • Diabetes Father    • Tuberculosis Maternal Grandmother    • No Known Problems Son    • Drug abuse Son    • Malig Hyperthermia Neg Hx         Review of Systems:   General: Patient reports good health  Eyes: No eye problems  Ears, nose, mouth and throat: No rhinitis, no hearing problems, no chronic cough  Cardiovascular/heart: Denies palpitations, syncope or chest pain  Respiratory/lung: Denies shortness of breath, hemoptysis, or dyspnea on exertion   Genital/urinary: No frequency, hematuria or dysuria  Hematological/lymphatic: Denies anemia or other problems  Musculoskeletal: No joint pain, no defects  Skin: No psoriasis or other skin issues  Neurological: No seizures or other neurological problems  Psychiatric: None  Endocrine: Negative  Gastro-intestinal: No constipation, no diarrhea, no melena, no hematochezia    /81 (BP Location: Left arm, Patient Position: Lying)   Pulse 79   Temp 97.9 °F (36.6 °C) (Oral)   Resp 18   Wt 72.1 kg (159 lb)   SpO2 99%   BMI 24.18 kg/m²     Physical Examination:  General: Alert and oriented x3 in no acute distress  HEENT: Normal cephalic, atraumatic, PERRLA, EOMI, sclera anicteric, moist mucous membranes, neck is supple, no JVD, no carotid bruits, no thyromegaly no adenopathy  Chest: CTA and percussion  CVA: RRR, normal S1-S2, no murmurs, no gallops or rubs  Abdomen: Positive BS, soft, nondistended, nontender, no rebound, no guarding, no hernias, no organomegaly and no  masses  Extremities: Full range of motion, no clubbing, no cyanosis or edema  Neurovascular: Grossly intact  Debilities: None  Emotional Behavior: Appropriate     Impression:  74 y.o. male for screening colonoscopy with personal history of colon polyps.    Plan:  Patient is presenting for screening colonoscopy with personal history of colon polyps.  I have recommended that the patient undergo a screening colonoscopy in accordance of American Cancer Society's guidelines.  I have discussed this procedure in detail with the patient.  I have discussed the risks, benefits and alternatives.  I have discussed the risk of anesthesia, bleeding and perforation.  Patient understands these risks, benefits and alternatives and wishes to proceed.      Claire Galo MD  General, Minimally Invasive and Endoscopic Surgery  [facility]    [address] [place] [address]    [address]  [address] [address]    P: [phone]  F: [phone]    Cc:  Mariah Delgado MD

## 2024-07-11 DIAGNOSIS — I10 BENIGN ESSENTIAL HYPERTENSION: ICD-10-CM

## 2024-07-11 RX ORDER — HYDROCHLOROTHIAZIDE 25 MG/1
TABLET ORAL
Qty: 90 TABLET | Refills: 0 | Status: SHIPPED | OUTPATIENT
Start: 2024-07-11 | End: 2024-07-30

## 2024-07-30 ENCOUNTER — OFFICE VISIT (OUTPATIENT)
Dept: FAMILY MEDICINE | Facility: CLINIC | Age: 74
End: 2024-07-30
Payer: COMMERCIAL

## 2024-07-30 VITALS
BODY MASS INDEX: 29.62 KG/M2 | HEART RATE: 74 BPM | TEMPERATURE: 97.6 F | WEIGHT: 218.7 LBS | DIASTOLIC BLOOD PRESSURE: 75 MMHG | OXYGEN SATURATION: 97 % | RESPIRATION RATE: 16 BRPM | HEIGHT: 72 IN | SYSTOLIC BLOOD PRESSURE: 125 MMHG

## 2024-07-30 DIAGNOSIS — R09.89 RIGHT CAROTID BRUIT: ICD-10-CM

## 2024-07-30 DIAGNOSIS — R97.20 ELEVATED PROSTATE SPECIFIC ANTIGEN (PSA): ICD-10-CM

## 2024-07-30 DIAGNOSIS — E78.5 HYPERLIPIDEMIA LDL GOAL <100: ICD-10-CM

## 2024-07-30 DIAGNOSIS — I10 BENIGN ESSENTIAL HYPERTENSION: ICD-10-CM

## 2024-07-30 DIAGNOSIS — R82.998 FOAMY URINE: ICD-10-CM

## 2024-07-30 DIAGNOSIS — G62.9 PERIPHERAL POLYNEUROPATHY: ICD-10-CM

## 2024-07-30 DIAGNOSIS — C02.9 SQUAMOUS CELL CARCINOMA OF TONGUE (H): ICD-10-CM

## 2024-07-30 DIAGNOSIS — Z12.5 SCREENING FOR PROSTATE CANCER: ICD-10-CM

## 2024-07-30 DIAGNOSIS — M54.2 NECK PAIN: ICD-10-CM

## 2024-07-30 DIAGNOSIS — Z00.00 ENCOUNTER FOR MEDICARE ANNUAL WELLNESS EXAM: Primary | ICD-10-CM

## 2024-07-30 LAB
ALBUMIN UR-MCNC: NEGATIVE MG/DL
APPEARANCE UR: CLEAR
BILIRUB UR QL STRIP: NEGATIVE
COLOR UR AUTO: YELLOW
ERYTHROCYTE [DISTWIDTH] IN BLOOD BY AUTOMATED COUNT: 12.5 % (ref 10–15)
GLUCOSE UR STRIP-MCNC: NEGATIVE MG/DL
HBA1C MFR BLD: 5.4 % (ref 0–5.6)
HCT VFR BLD AUTO: 49.1 % (ref 40–53)
HGB BLD-MCNC: 17 G/DL (ref 13.3–17.7)
HGB UR QL STRIP: NEGATIVE
KETONES UR STRIP-MCNC: NEGATIVE MG/DL
LEUKOCYTE ESTERASE UR QL STRIP: NEGATIVE
MCH RBC QN AUTO: 29.6 PG (ref 26.5–33)
MCHC RBC AUTO-ENTMCNC: 34.6 G/DL (ref 31.5–36.5)
MCV RBC AUTO: 86 FL (ref 78–100)
NITRATE UR QL: NEGATIVE
PH UR STRIP: 7 [PH] (ref 5–7)
PLATELET # BLD AUTO: 220 10E3/UL (ref 150–450)
RBC # BLD AUTO: 5.74 10E6/UL (ref 4.4–5.9)
SP GR UR STRIP: 1.01 (ref 1–1.03)
UROBILINOGEN UR STRIP-ACNC: 0.2 E.U./DL
WBC # BLD AUTO: 6.9 10E3/UL (ref 4–11)

## 2024-07-30 PROCEDURE — 85027 COMPLETE CBC AUTOMATED: CPT | Performed by: INTERNAL MEDICINE

## 2024-07-30 PROCEDURE — 80061 LIPID PANEL: CPT | Performed by: INTERNAL MEDICINE

## 2024-07-30 PROCEDURE — 81003 URINALYSIS AUTO W/O SCOPE: CPT | Performed by: INTERNAL MEDICINE

## 2024-07-30 PROCEDURE — 36415 COLL VENOUS BLD VENIPUNCTURE: CPT | Performed by: INTERNAL MEDICINE

## 2024-07-30 PROCEDURE — G0103 PSA SCREENING: HCPCS | Performed by: INTERNAL MEDICINE

## 2024-07-30 PROCEDURE — 99214 OFFICE O/P EST MOD 30 MIN: CPT | Mod: 25 | Performed by: INTERNAL MEDICINE

## 2024-07-30 PROCEDURE — 80053 COMPREHEN METABOLIC PANEL: CPT | Performed by: INTERNAL MEDICINE

## 2024-07-30 PROCEDURE — G0439 PPPS, SUBSEQ VISIT: HCPCS | Performed by: INTERNAL MEDICINE

## 2024-07-30 PROCEDURE — 83036 HEMOGLOBIN GLYCOSYLATED A1C: CPT | Mod: GZ | Performed by: INTERNAL MEDICINE

## 2024-07-30 RX ORDER — ATORVASTATIN CALCIUM 10 MG/1
10 TABLET, FILM COATED ORAL DAILY
Qty: 90 TABLET | Refills: 3 | Status: SHIPPED | OUTPATIENT
Start: 2024-07-30 | End: 2024-09-04

## 2024-07-30 RX ORDER — IRBESARTAN 300 MG/1
300 TABLET ORAL DAILY
Qty: 90 TABLET | Refills: 3 | Status: SHIPPED | OUTPATIENT
Start: 2024-07-30

## 2024-07-30 RX ORDER — HYDROCHLOROTHIAZIDE 25 MG/1
TABLET ORAL
Qty: 90 TABLET | Refills: 3 | Status: SHIPPED | OUTPATIENT
Start: 2024-07-30

## 2024-07-30 RX ORDER — AMLODIPINE BESYLATE 10 MG/1
10 TABLET ORAL DAILY
Qty: 90 TABLET | Refills: 3 | Status: SHIPPED | OUTPATIENT
Start: 2024-07-30

## 2024-07-30 ASSESSMENT — PAIN SCALES - GENERAL: PAINLEVEL: MILD PAIN (3)

## 2024-07-30 ASSESSMENT — SOCIAL DETERMINANTS OF HEALTH (SDOH): HOW OFTEN DO YOU GET TOGETHER WITH FRIENDS OR RELATIVES?: TWICE A WEEK

## 2024-07-30 NOTE — PROGRESS NOTES
Preventive Care Visit  [facility]  Lam Rodríguez MD, Internal Medicine  Jul 30, 2024      Assessment & Plan     Encounter for Medicare annual wellness exam    - Comprehensive metabolic panel; Future  - CBC with platelets; Future  - HEMOGLOBIN A1C; Future    Benign essential hypertension  OK control; continue current drugs   - amLODIPine (NORVASC) 10 MG tablet; Take 1 tablet (10 mg) by mouth daily  - hydrochlorothiazide (HYDRODIURIL) 25 MG tablet; TAKE 1 TABLET BY MOUTH DAILY AS NEEDED  - irbesartan (AVAPRO) 300 MG tablet; Take 1 tablet (300 mg) by mouth daily  - OFFICE/OUTPT VISIT,EST,LEVL III    Hyperlipidemia LDL goal <100  On statin therapy; recheck   - Lipid panel reflex to direct LDL Non-fasting; Future  - atorvastatin (LIPITOR) 10 MG tablet; Take 1 tablet (10 mg) by mouth daily  - OFFICE/OUTPT VISIT,EST,LEVL III    Peripheral polyneuropathy      Squamous cell carcinoma of tongue (H)  Continues to follow up at [facility] for rechecks on this  Radiation hx is risk factor for carotid disease see below    Neck pain  Can try some PT for this; return to clinic if it does not help enough  - Physical Therapy  Referral; Future  - OFFICE/OUTPT VISIT,EST,LEVL III    Elevated prostate specific antigen (PSA)  Rechecking     Screening for prostate cancer    - PROSTATE SPEC ANTIGEN SCREEN; Future    Foamy urine  Checking UA   - UA Macroscopic with reflex to Microscopic and Culture - Lab Collect; Future  - OFFICE/OUTPT VISIT,EST,LEVL III    Right carotid bruit  Recommended ultrasound to evaluate further   - US Carotid Bilateral; Future    Patient has been advised of split billing requirements and indicates understanding: Yes        BMI  Estimated body mass index is 29.66 kg/m  as calculated from the following:    Height as of this encounter: 1.829 m (6').    Weight as of this encounter: 99.2 kg (218 lb 11.2 oz).   Weight management plan: Discussed healthy diet and exercise  guidelines    Counseling  Appropriate preventive services were addressed with this patient via screening, questionnaire, or discussion as appropriate for fall prevention, nutrition, physical activity, Tobacco-use cessation, weight loss and cognition.  Checklist reviewing preventive services available has been given to the patient.  Reviewed patient's diet, addressing concerns and/or questions.   The patient was provided with written information regarding signs of hearing loss.   -discussed COVID shot at pharmacy     FUTURE APPOINTMENTS:       - Follow-up for annual visit or as needed    Subjective   Chrystal is a 73 year old, presenting for the following:  Physical          Health Care Directive  Patient does not have a Health Care Directive or Living Will: Discussed advance care planning with patient; information given to patient to review.    HPI    Long history of neck pain dating back for years  Wants to try some physical therapy for this  He denies arm pain/numbness or tingling   Foamy urine for several weeks, so he wants to check a UA        7/30/2024   General Health   How would you rate your overall physical health? Good   Feel stress (tense, anxious, or unable to sleep) Not at all            7/30/2024   Nutrition   Diet: Regular (no restrictions)            6/28/2023   Exercise   Frequency of exercise: 4-5 days/week            7/30/2024   Social Factors   Frequency of gathering with friends or relatives Twice a week   Worry food won't last until get money to buy more No   Food not last or not have enough money for food? No   Do you have housing? (Housing is defined as stable permanent housing and does not include staying ouside in a car, in a tent, in an abandoned building, in an overnight shelter, or couch-surfing.) Yes   Are you worried about losing your housing? No   Lack of transportation? No   Unable to get utilities (heat,electricity)? No            7/30/2024   Fall Risk   Fallen 2 or more times in the  past year? No    No   Trouble with walking or balance? No    No       Multiple values from one day are sorted in reverse-chronological order          2024   Activities of Daily Living- Home Safety   Needs help with the following daily activites None of the above   Safety concerns in the home None of the above            2024   Dental   Dentist two times every year? Yes            2024   Hearing Screening   Hearing concerns? (!) IT'S HARDER TO UNDERSTAND WOMEN'S VOICES THAN MEN'S VOICES.    (!) TROUBLE UNDERSTANDING SOFT OR WHISPERED SPEECH.       Multiple values from one day are sorted in reverse-chronological order         2024   Driving Risk Screening   Patient/family members have concerns about driving No            2024   General Alertness/Fatigue Screening   Have you been more tired than usual lately? No            2024   Urinary Incontinence Screening   Bothered by leaking urine in past 6 months No            2024   TB Screening   Were you born outside of the [place]? No            Today's PHQ-2 Score:       2024     2:00 PM   PHQ-2 (  [employer])   Q1: Little interest or pleasure in doing things 1   Q2: Feeling down, depressed or hopeless 1   PHQ-2 Score 2   Q1: Little interest or pleasure in doing things Several days   Q2: Feeling down, depressed or hopeless Several days   PHQ-2 Score 2           2024   Substance Use   Alcohol more than 3/day or more than 7/wk Not Applicable   Do you have a current opioid prescription? No   How severe/bad is pain from 1 to 10? 3/10   Do you use any other substances recreationally? No        Social History     Tobacco Use    Smoking status: Former     Current packs/day: 0.00     Average packs/day: 0.3 packs/day for 2.0 years (0.5 ttl pk-yrs)     Types: Cigarettes     Start date: 1976     Quit date: 1978     Years since quittin.6    Smokeless tobacco: Never    Tobacco comments:     smoked socially in college   Substance Use  Topics    Alcohol use: Not Currently     Alcohol/week: 0.0 standard drinks of alcohol    Drug use: No           7/30/2024   AAA Screening   Family history of Abdominal Aortic Aneurysm (AAA)? No      ASCVD Risk   The 10-year ASCVD risk score (Karie ARAUZ, et al., 2019) is: 24.5%    Values used to calculate the score:      Age: 73 years      Sex: Male      Is Non- : No      Diabetic: No      Tobacco smoker: No      Systolic Blood Pressure: 125 mmHg      Is BP treated: Yes      HDL Cholesterol: 35 mg/dL      Total Cholesterol: 171 mg/dL            Reviewed and updated as needed this visit by Provider                    Past Medical History:   Diagnosis Date    Arthritis     Benign essential hypertension 01/11/2017    Bronchitis     Hearing loss, mixed, bilateral     Hernia, abdominal     Hyperlipidemia LDL goal <130 01/11/2017    Hypertension     Intervertebral cervical disc disorder with myelopathy, cervical region 2006    had injections    Malignant neoplasm (H) 12/06/2010    squamous cell of tongue     Mumps     Numbness and tingling     numbness in toes    Peripheral polyneuropathy 01/11/2017    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     Past Surgical History:   Procedure Laterality Date    BIOPSY      COLONOSCOPY  3/2009    COLONOSCOPY N/A 3/8/2017    Procedure: COMBINED COLONOSCOPY, SINGLE OR MULTIPLE BIOPSY/POLYPECTOMY BY BIOPSY;  Surgeon: Yung Maguire MD;  Location:  GI    COLONOSCOPY Left 3/3/2022    Procedure: COLONOSCOPY, WITH BIOPSY using cold bx forcep;  Surgeon: Yung Maguire MD;  Location:  GI    CYSTOSCOPY, TRANSURETHRAL RESECTION (TUR) PROSTATE, COMBINED N/A 3/24/2021    Procedure: Cystoscopy, transurethral resection of the prostate;  Surgeon: Yosi Campbell MD;  Location:  OR    ENT SURGERY      EXCISE LESION TRUNK N/A 2/3/2017    Procedure: EXCISE LESION TRUNK;  Surgeon: Jaswinder Lopez MD;  [facility]    EXCISE LESION  UPPER EXTREMITY Left 2/3/2017    Procedure: EXCISE LESION UPPER EXTREMITY;  Surgeon: Jaswinder Lopez MD;  Location: [facility]    HEAD & NECK SURGERY      HERNIA REPAIR Left 2002    ORTHOPEDIC SURGERY  2002    torn ligament right shoulder    SOFT TISSUE SURGERY  12/6/10    excisional biopsy of tongue lesion    tonsillectomy  Age 6     BP Readings from Last 3 Encounters:   07/30/24 125/75   06/28/23 130/75   02/07/23 110/80    Wt Readings from Last 3 Encounters:   07/30/24 99.2 kg (218 lb 11.2 oz)   06/28/23 104.7 kg (230 lb 12.8 oz)   02/07/23 108.9 kg (240 lb)                  Patient Active Problem List   Diagnosis    Squamous cell carcinoma of tongue (H)    Benign essential hypertension    Hyperlipidemia LDL goal <100    Peripheral polyneuropathy    Elevated prostate specific antigen (PSA)     Past Surgical History:   Procedure Laterality Date    BIOPSY      COLONOSCOPY  3/2009    COLONOSCOPY N/A 3/8/2017    Procedure: COMBINED COLONOSCOPY, SINGLE OR MULTIPLE BIOPSY/POLYPECTOMY BY BIOPSY;  Surgeon: Yung Maguire MD;  Location:  GI    COLONOSCOPY Left 3/3/2022    Procedure: COLONOSCOPY, WITH BIOPSY using cold bx forcep;  Surgeon: Yung Maguire MD;  Location:  GI    CYSTOSCOPY, TRANSURETHRAL RESECTION (TUR) PROSTATE, COMBINED N/A 3/24/2021    Procedure: Cystoscopy, transurethral resection of the prostate;  Surgeon: Yosi Campbell MD;  Location: [facility] OR    ENT SURGERY      EXCISE LESION TRUNK N/A 2/3/2017    Procedure: EXCISE LESION TRUNK;  Surgeon: Jaswinder Lopez MD;  Location: [facility]    EXCISE LESION UPPER EXTREMITY Left 2/3/2017    Procedure: EXCISE LESION UPPER EXTREMITY;  Surgeon: Jaswinder Lopez MD;  Location: [facility]    HEAD & NECK SURGERY      HERNIA REPAIR Left 2002    ORTHOPEDIC SURGERY  2002    torn ligament right shoulder    SOFT TISSUE SURGERY  12/6/10    excisional biopsy of tongue lesion    tonsillectomy  Age 6       Social History     Tobacco Use    Smoking status:  Former     Current packs/day: 0.00     Average packs/day: 0.3 packs/day for 2.0 years (0.5 ttl pk-yrs)     Types: Cigarettes     Start date: 1976     Quit date: 1978     Years since quittin.6    Smokeless tobacco: Never    Tobacco comments:     smoked socially in college   Substance Use Topics    Alcohol use: Not Currently     Alcohol/week: 0.0 standard drinks of alcohol     Family History   Problem Relation Age of Onset    Cerebrovascular Disease Mother     Hypertension Mother     Respiratory Father         heavy smoker    Cancer Maternal Grandfather         lip cancer--heavy smoker    Unknown/Adopted Son     Unknown/Adopted Daughter          Current Outpatient Medications   Medication Sig Dispense Refill    amLODIPine (NORVASC) 10 MG tablet Take 1 tablet (10 mg) by mouth daily 90 tablet 3    atorvastatin (LIPITOR) 10 MG tablet Take 1 tablet (10 mg) by mouth daily 90 tablet 3    hydrochlorothiazide (HYDRODIURIL) 25 MG tablet TAKE 1 TABLET BY MOUTH DAILY AS NEEDED 90 tablet 0    irbesartan (AVAPRO) 300 MG tablet Take 1 tablet (300 mg) by mouth daily 90 tablet 3     No Known Allergies  Current providers sharing in care for this patient include:  Patient Care Team:  Lam Rodríguez MD as PCP - General (Internal Medicine)  Lam Rodríguez MD as Assigned PCP  Yosi Campbell MD as Assigned Surgical Provider    The following health maintenance items are reviewed in Epic and correct as of today:  Health Maintenance   Topic Date Due    COVID-19 Vaccine (2023- season) 2024    MEDICARE ANNUAL WELLNESS VISIT  2024    LIPID  2024    ANNUAL REVIEW OF HM ORDERS  2024    INFLUENZA VACCINE (1) 2024    FALL RISK ASSESSMENT  2025    GLUCOSE  2026    COLORECTAL CANCER SCREENING  2027    ADVANCE CARE PLANNING  2029    DTAP/TDAP/TD IMMUNIZATION (3 - Td or Tdap) 2033    HEPATITIS C SCREENING  Completed    PHQ-2 (once per calendar year)  Completed     Pneumococcal Vaccine: 65+ Years  Completed    ZOSTER IMMUNIZATION  Completed    RSV VACCINE (Pregnancy & 60+)  Completed    AORTIC ANEURYSM SCREENING (SYSTEM ASSIGNED)  Completed    IPV IMMUNIZATION  Aged Out    HPV IMMUNIZATION  Aged Out    MENINGITIS IMMUNIZATION  Aged Out    RSV MONOCLONAL ANTIBODY  Aged Out            Objective    Exam  /75 (BP Location: Right arm, Patient Position: Sitting, Cuff Size: Adult Large)   Pulse 74   Temp 97.6  F (36.4  C) (Tympanic)   Resp 16   Ht 1.829 m (6')   Wt 99.2 kg (218 lb 11.2 oz)   SpO2 97%   BMI 29.66 kg/m     Estimated body mass index is 29.66 kg/m  as calculated from the following:    Height as of this encounter: 1.829 m (6').    Weight as of this encounter: 99.2 kg (218 lb 11.2 oz).    Physical Exam  GENERAL: alert and no distress  EYES: Eyes grossly normal to inspection, PERRL and conjunctivae and sclerae normal  HENT: ear canals and TM's normal, nose and mouth without ulcers or lesions  NECK: no adenopathy, no asymmetry, masses, or scars  RESP: lungs clear to auscultation - no rales, rhonchi or wheezes  CV: regular rate and rhythm, normal S1 S2, no S3 or S4, no murmur, click or rub, no peripheral edema; RIGHT CAROTID BRUIT NOTED  ABDOMEN: soft, nontender, no hepatosplenomegaly, no masses and bowel sounds normal  RECTAL: normal sphincter tone, no rectal masses, prostate normal size, smooth, nontender without nodules or masses  MS: no gross musculoskeletal defects noted, no edema  SKIN: no suspicious lesions or rashes  NEURO: Normal strength and tone, mentation intact and speech normal  PSYCH: mentation appears normal, affect normal/bright        7/30/2024   Mini Cog   Mini-Cog Not Completed (choose reason) Patient declines                 Signed Electronically by: Lam Rodríguez MD

## 2024-07-30 NOTE — PATIENT INSTRUCTIONS
Patient Education   Preventive Care Advice   This is general advice given by our system to help you stay healthy. However, your care team may have specific advice just for you. Please talk to your care team about your preventive care needs.  Nutrition  Eat 5 or more servings of fruits and vegetables each day.  Try wheat bread, brown rice and whole grain pasta (instead of white bread, rice, and pasta).  Get enough calcium and vitamin D. Check the label on foods and aim for 100% of the RDA (recommended daily allowance).  Lifestyle  Exercise at least 150 minutes each week  (30 minutes a day, 5 days a week).  Do muscle strengthening activities 2 days a week. These help control your weight and prevent disease.  No smoking.  Wear sunscreen to prevent skin cancer.  Have a dental exam and cleaning every 6 months.  Yearly exams  See your health care team every year to talk about:  Any changes in your health.  Any medicines your care team has prescribed.  Preventive care, family planning, and ways to prevent chronic diseases.  Shots (vaccines)   HPV shots (up to age 26), if you've never had them before.  Hepatitis B shots (up to age 59), if you've never had them before.  COVID-19 shot: Get this shot when it's due.  Flu shot: Get a flu shot every year.  Tetanus shot: Get a tetanus shot every 10 years.  Pneumococcal, hepatitis A, and RSV shots: Ask your care team if you need these based on your risk.  Shingles shot (for age 50 and up)  General health tests  Diabetes screening:  Starting at age 35, Get screened for diabetes at least every 3 years.  If you are younger than age 35, ask your care team if you should be screened for diabetes.  Cholesterol test: At age 39, start having a cholesterol test every 5 years, or more often if advised.  Bone density scan (DEXA): At age 50, ask your care team if you should have this scan for osteoporosis (brittle bones).  Hepatitis C: Get tested at least once in your life.  STIs (sexually  transmitted infections)  Before age 24: Ask your care team if you should be screened for STIs.  After age 24: Get screened for STIs if you're at risk. You are at risk for STIs (including HIV) if:  You are sexually active with more than one person.  You don't use condoms every time.  You or a partner was diagnosed with a sexually transmitted infection.  If you are at risk for HIV, ask about PrEP medicine to prevent HIV.  Get tested for HIV at least once in your life, whether you are at risk for HIV or not.  Cancer screening tests  Cervical cancer screening: If you have a cervix, begin getting regular cervical cancer screening tests starting at age 21.  Breast cancer scan (mammogram): If you've ever had breasts, begin having regular mammograms starting at age 40. This is a scan to check for breast cancer.  Colon cancer screening: It is important to start screening for colon cancer at age 45.  Have a colonoscopy test every 10 years (or more often if you're at risk) Or, ask your provider about stool tests like a FIT test every year or Cologuard test every 3 years.  To learn more about your testing options, visit:   .  For help making a decision, visit:   https://bit.ly/zv69890.  Prostate cancer screening test: If you have a prostate, ask your care team if a prostate cancer screening test (PSA) at age 55 is right for you.  Lung cancer screening: If you are a current or former smoker ages 50 to 80, ask your care team if ongoing lung cancer screenings are right for you.  For informational purposes only. Not to replace the advice of your health care provider. Copyright   2023 [facility] [employer]. All rights reserved. Clinically reviewed by the [facility]. [employer] 209964 - REV 01/24.  Hearing Loss: Care Instructions  Overview     Hearing loss is a sudden or slow decrease in how well you hear. It can range from slight to profound. Permanent hearing loss can occur with aging. It also can  happen when you are exposed long-term to loud noise. Examples include listening to loud music, riding motorcycles, or being around other loud machines.  Hearing loss can affect your work and home life. It can make you feel lonely or depressed. You may feel that you have lost your independence. But hearing aids and other devices can help you hear better and feel connected to others.  Follow-up care is a key part of your treatment and safety. Be sure to make and go to all appointments, and call your doctor if you are having problems. It's also a good idea to know your test results and keep a list of the medicines you take.  How can you care for yourself at home?  Avoid loud noises whenever possible. This helps keep your hearing from getting worse.  Always wear hearing protection around loud noises.  Wear a hearing aid as directed.  A professional can help you pick a hearing aid that will work best for you.  You can also get hearing aids over the counter for mild to moderate hearing loss.  Have hearing tests as your doctor suggests. They can show whether your hearing has changed. Your hearing aid may need to be adjusted.  Use other devices as needed. These may include:  Telephone amplifiers and hearing aids that can connect to a television, stereo, radio, or microphone.  Devices that use lights or vibrations. These alert you to the doorbell, a ringing telephone, or a baby monitor.  Television closed-captioning. This shows the words at the bottom of the screen. Most new TVs can do this.  TTY (text telephone). This lets you type messages back and forth on the telephone instead of talking or listening. These devices are also called TDD. When messages are typed on the keyboard, they are sent over the phone line to a receiving TTY. The message is shown on a monitor.  Use text messaging, social media, and email if it is hard for you to communicate by telephone.  Try to learn a listening technique called speechreading. It is  "not lipreading. You pay attention to people's gestures, expressions, posture, and tone of voice. These clues can help you understand what a person is saying. Face the person you are talking to, and have them face you. Make sure the lighting is good. You need to see the other person's face clearly.  Think about counseling if you need help to adjust to your hearing loss.  When should you call for help?  Watch closely for changes in your health, and be sure to contact your doctor if:    You think your hearing is getting worse.     You have new symptoms, such as dizziness or nausea.   Where can you learn more?  Go to https://www.[employer].net/patiented  Enter R798 in the search box to learn more about \"Hearing Loss: Care Instructions.\"  Current as of: September 27, 2023               Content Version: 14.0    [id number] [employer].   Care instructions adapted under license by your healthcare professional. If you have questions about a medical condition or this instruction, always ask your healthcare professional. [employer], [employer] disclaims any warranty or liability for your use of this information.         "

## 2024-07-31 LAB
ALBUMIN SERPL BCG-MCNC: 5 G/DL (ref 3.5–5.2)
ALP SERPL-CCNC: 80 U/L (ref 40–150)
ALT SERPL W P-5'-P-CCNC: 36 U/L (ref 0–70)
ANION GAP SERPL CALCULATED.3IONS-SCNC: 14 MMOL/L (ref 7–15)
AST SERPL W P-5'-P-CCNC: 35 U/L (ref 0–45)
BILIRUB SERPL-MCNC: 1.3 MG/DL
BUN SERPL-MCNC: 15.8 MG/DL (ref 8–23)
CALCIUM SERPL-MCNC: 9.4 MG/DL (ref 8.8–10.4)
CHLORIDE SERPL-SCNC: 97 MMOL/L (ref 98–107)
CHOLEST SERPL-MCNC: 148 MG/DL
CREAT SERPL-MCNC: 0.9 MG/DL (ref 0.67–1.17)
EGFRCR SERPLBLD CKD-EPI 2021: >90 ML/MIN/1.73M2
FASTING STATUS PATIENT QL REPORTED: YES
FASTING STATUS PATIENT QL REPORTED: YES
GLUCOSE SERPL-MCNC: 101 MG/DL (ref 70–99)
HCO3 SERPL-SCNC: 26 MMOL/L (ref 22–29)
HDLC SERPL-MCNC: 43 MG/DL
LDLC SERPL CALC-MCNC: 90 MG/DL
NONHDLC SERPL-MCNC: 105 MG/DL
POTASSIUM SERPL-SCNC: 3.7 MMOL/L (ref 3.4–5.3)
PROT SERPL-MCNC: 7 G/DL (ref 6.4–8.3)
PSA SERPL DL<=0.01 NG/ML-MCNC: 4.89 NG/ML (ref 0–6.5)
SODIUM SERPL-SCNC: 137 MMOL/L (ref 135–145)
TRIGL SERPL-MCNC: 75 MG/DL

## 2024-07-31 NOTE — RESULT ENCOUNTER NOTE
The following letter pertains to your most recent diagnostic tests:    -Liver and gallbladder tests are normal for you. (ALT,AST, Alk phos, bilirubin), kidney function is normal for you (Creatinine, GFR), Sodium is normal, Potassium is normal for you, Calcium is normal for you, Glucose (blood sugar) is normal for you.      -Your cholesterol panel looks healthy.     -Your prostate specific antigen (PSA) test result returned normal.     -The urine test does not reveal any dangerous causes for foamy urine.    -Your hemoglobin A1c test which averages your blood sugars over the last 3 months returned normal.  No evidence for diabetes or prediabetes.       -Your complete blood counts including your hemoglobin returned normal for you.           Bottom line:  Overall, the lab results look OK for you.        Follow up:  schedule the carotid ultrasound when you can.        Sincerely,    Dr. Rodríguez

## 2024-08-29 ENCOUNTER — HOSPITAL ENCOUNTER (OUTPATIENT)
Dept: ULTRASOUND IMAGING | Facility: CLINIC | Age: 74
Discharge: HOME | End: 2024-08-29
Attending: INTERNAL MEDICINE | Admitting: INTERNAL MEDICINE
Payer: COMMERCIAL

## 2024-08-29 DIAGNOSIS — R09.89 RIGHT CAROTID BRUIT: ICD-10-CM

## 2024-08-29 PROCEDURE — 93880 EXTRACRANIAL BILAT STUDY: CPT

## 2024-08-29 NOTE — RESULT ENCOUNTER NOTE
Can you please call Mr. Solis and help him set up a telephone or video appointment with Dr. Rodríguez next week to discuss his ultrasound result

## 2024-08-29 NOTE — RESULT ENCOUNTER NOTE
The following letter pertains to your most recent diagnostic tests:    The ultrasound does confirm a moderate blockage in the right carotid artery that explain the abnormal sound that I heard on you physical exam.  Fortunately, by ultrasound, the blockage is not severe enough to cause restriction of blood flow so you are not in any immediate danger and no immediate intervention is necessary.  That being said, there are some things that we can do to prevent the blockage from progressing.  I recommend that we schedule a telephone or video appointment next week to discuss the matter in more detail and discuss strategies for stabilizing the blockage and surveillance.  I will have my staff reach out to you to help you schedule an appointment.          Sincerely,    Dr. Rodríguez

## 2024-09-04 ENCOUNTER — VIRTUAL VISIT (OUTPATIENT)
Dept: FAMILY MEDICINE | Facility: CLINIC | Age: 74
End: 2024-09-04
Payer: COMMERCIAL

## 2024-09-04 ENCOUNTER — MYC MEDICAL ADVICE (OUTPATIENT)
Dept: FAMILY MEDICINE | Facility: CLINIC | Age: 74
End: 2024-09-04

## 2024-09-04 DIAGNOSIS — E78.5 HYPERLIPIDEMIA LDL GOAL <70: ICD-10-CM

## 2024-09-04 DIAGNOSIS — I65.22 LEFT CAROTID STENOSIS: Primary | ICD-10-CM

## 2024-09-04 PROCEDURE — 99443 PR PHYSICIAN TELEPHONE EVALUATION 21-30 MIN: CPT | Mod: 93 | Performed by: INTERNAL MEDICINE

## 2024-09-04 RX ORDER — ATORVASTATIN CALCIUM 40 MG/1
40 TABLET, FILM COATED ORAL DAILY
Qty: 90 TABLET | Refills: 3 | Status: SHIPPED | OUTPATIENT
Start: 2024-09-04

## 2024-09-04 NOTE — PROGRESS NOTES
Chrystal is a 73 year old who is being evaluated via a billable telephone visit.    What phone number would you like to be contacted at? [phone]  How would you like to obtain your AVS? MyChart  Originating Location (pt. Location): Home    Distant Location (provider location):  On-site    Assessment & Plan     Left carotid stenosis  Recommend CT to further evaluate  Recommend increasing statin dose for goal LDL < 55  Vascular surgery consult pending result    - CTA Head Neck with Contrast; Future    Hyperlipidemia LDL goal <70  He had a lot of questions about statin therapy, accounting for extended time of phone call; side effects and risks discussed   Recheck lipids 1 month after changing dose   - atorvastatin (LIPITOR) 40 MG tablet; Take 1 tablet (40 mg) by mouth daily.  - Lipid panel reflex to direct LDL Fasting; Future            FUTURE APPOINTMENTS:       - Schedule fasting labs in 1 months  Subjective   Chrystal is a 73 year old, presenting for the following health issues:  Results    History of Present Illness       Reason for visit:  Ultrasound results   He is taking medications regularly.     Discussed ultrasound result  No concerning symptoms specifically no amaurosis fugax, facial droop, slurred speech, unilateral weakness       Objective           Vitals:  No vitals were obtained today due to virtual visit.    Physical Exam   General: Alert and no distress //Respiratory: No audible wheeze, cough, or shortness of breath // Psychiatric:  Appropriate affect, tone, and pace of words            Phone call duration: 26 minutes  Signed Electronically by: aLm Rodríguez MD

## 2024-09-05 ENCOUNTER — TELEPHONE (OUTPATIENT)
Dept: OTHER | Facility: CLINIC | Age: 74
End: 2024-09-05
Payer: COMMERCIAL

## 2024-09-05 NOTE — TELEPHONE ENCOUNTER
Concerns noted in appt note for provider to review prior to consult.  Martha Bell, OLIVIERN, RN, CV-[facility] [place]

## 2024-09-05 NOTE — TELEPHONE ENCOUNTER
[facility]    Who is the name of the provider?:  NONE   What is the location you see this provider at/preferred location?: [place]  Person calling / Facility: Chrystal Solis  Phone number:  [phone] (home)   Nurse call back needed:  no     Reason for call:  Patient is calling to schedule an appointment. Patient stated that he was referred.please advise what  needs to be scheduled.    Pharmacy location:     [facility] [address] [id number] - [place], [place] - [facility] [address] [facility] AT [facility] [address]  Outside Imaging: n/a   Can we leave a detailed message on this number?  YES     9/5/2024, 9:34 AM

## 2024-09-05 NOTE — TELEPHONE ENCOUNTER
Referral received via REMOTV on 09/05/24.    Pt referred to [facility] by Dr. Lam Rodríguez for left carotid stenosis. US carotid bilateral completed 08/29/24.    Routing to scheduling to coordinate the following:  NEW VASCULAR PATIENT consult with Vascular Medicine  Please schedule this at next available    Referring provider requesting Vascular Surgery, however per clinic scheduling guidelines, patient is to be seen by Vascular Medicine for consultation first.     Appt note:  Pt referred to [facility] by Dr. Lam Rodríguez for left carotid stenosis.  US carotid bilateral completed 08/29/24.

## 2024-09-05 NOTE — TELEPHONE ENCOUNTER
Patient scheduled for consult with Dr Alanis on 09/24/24 and added to waitlist.     Patient states having concerns if CT w/ contrast scans are needed as he is worried about possible impacts on his head, ears, and brain. States he has had 35 radiation treatments due to cancer he previously had. Patient would like to know if MRI scans would be a safer alternative if ultrasound imaging and is not enough. States he is not refusing to do the potential recommended testing but would like to note concerns.     Routing to RN as FYI patient wanted provider to be aware of this concern going into the consult visit.

## 2024-09-10 ENCOUNTER — OFFICE VISIT (OUTPATIENT)
Dept: OTHER | Facility: CLINIC | Age: 74
End: 2024-09-10
Attending: INTERNAL MEDICINE
Payer: COMMERCIAL

## 2024-09-10 VITALS
BODY MASS INDEX: 29.95 KG/M2 | HEART RATE: 71 BPM | OXYGEN SATURATION: 98 % | SYSTOLIC BLOOD PRESSURE: 131 MMHG | WEIGHT: 220.8 LBS | DIASTOLIC BLOOD PRESSURE: 73 MMHG

## 2024-09-10 DIAGNOSIS — E78.5 HYPERLIPIDEMIA LDL GOAL <70: ICD-10-CM

## 2024-09-10 DIAGNOSIS — C02.9 SQUAMOUS CELL CARCINOMA OF TONGUE (H): ICD-10-CM

## 2024-09-10 DIAGNOSIS — I65.21 CAROTID STENOSIS, ASYMPTOMATIC, RIGHT: Primary | ICD-10-CM

## 2024-09-10 DIAGNOSIS — I10 BENIGN ESSENTIAL HYPERTENSION: ICD-10-CM

## 2024-09-10 PROCEDURE — 99205 OFFICE O/P NEW HI 60 MIN: CPT | Performed by: INTERNAL MEDICINE

## 2024-09-10 PROCEDURE — G2211 COMPLEX E/M VISIT ADD ON: HCPCS | Performed by: INTERNAL MEDICINE

## 2024-09-10 PROCEDURE — G0463 HOSPITAL OUTPT CLINIC VISIT: HCPCS | Performed by: INTERNAL MEDICINE

## 2024-09-10 NOTE — PATIENT INSTRUCTIONS
Take Baby aspirin 81 mg daily with food OTC     Please complete CTA of head and neck then see Dr. Orozco vascular surgeon     Take irbesartan at bed time and amlodipine in am , if dizziness may cut the irbesartan pill into half and take it

## 2024-09-10 NOTE — PROGRESS NOTES
[facility]        Patient is here for a consult to discuss left carotid stenosis.    Pt is currently taking Statin.    /73 (BP Location: Left arm, Patient Position: Sitting, Cuff Size: Adult Regular)   Pulse 71   Wt 220 lb 12.8 oz (100.2 kg)   SpO2 98%   BMI 29.95 kg/m      The provider has been notified that the patient has no concerns.     Questions patient would like addressed today are: N/A.    Refills are needed: N/A    Has homecare services and agency name:  Karoline Stewart MA

## 2024-09-10 NOTE — PROGRESS NOTES
[facility] INITIAL VASCULAR MEDICINE CONSULT    ( New patient visit)     PRIMARY HEALTH CARE PROVIDER:  Lam Rodríguez MD      REFERRING HEALTH CARE PROVIDER;  Lam Rodríguez MD      REASON FOR CONSULT: Evaluation and management of right-sided carotid stenosis more than left side and is asymptomatic with history of hypertension, hyperlipidemia previous smoker with history of tongue cancer status post surgery then followed by radiation treatment in 2011      HPI: Chrystal Solis is a 73 year old very pleasant male with past medical history of a tongue cancer diagnosed in 2010 underwent surgery then followed by multiple radiation treatments in 2011, mixed bilateral hearing loss, hypertension, hyperlipidemia, chronic back issues, peripheral neuropathy on routine exam recently Dr. Rodríguez heard right-sided carotid bruit and arranged bilateral carotid ultrasound results as delineated below moderate plaque with 50 to 69% stenosis on the right side and left side less than 50% stenosis and mild plaque.  He has no history of a TIA or strokelike symptoms.  Blood pressure well-controlled with the medications and LDL is not at goal he was taking atorvastatin 10 mg daily and that was increased recently to 40 mg daily and tolerating.  He denies any chest pain, shortness of breath or palpitations    He is new to me reviewed available records in the epic  He accompanied by his wife today for this visit      PAST MEDICAL HISTORY  Past Medical History:   Diagnosis Date    Arthritis     Benign essential hypertension 01/11/2017    Bronchitis     Hearing loss, mixed, bilateral     Hernia, abdominal     Hyperlipidemia LDL goal <130 01/11/2017    Hypertension     Intervertebral cervical disc disorder with myelopathy, cervical region 2006    had injections    Malignant neoplasm (H) 12/06/2010    squamous cell of tongue     Mumps     Numbness and tingling     numbness in toes    Peripheral polyneuropathy  01/11/2017    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       CURRENT MEDICATIONS  Current Outpatient Medications   Medication Sig Dispense Refill    amLODIPine (NORVASC) 10 MG tablet Take 1 tablet (10 mg) by mouth daily 90 tablet 3    atorvastatin (LIPITOR) 40 MG tablet Take 1 tablet (40 mg) by mouth daily. 90 tablet 3    hydrochlorothiazide (HYDRODIURIL) 25 MG tablet TAKE 1 TABLET BY MOUTH DAILY AS NEEDED 90 tablet 3    irbesartan (AVAPRO) 300 MG tablet Take 1 tablet (300 mg) by mouth daily 90 tablet 3     No current facility-administered medications for this visit.       PAST SURGICAL HISTORY:  Past Surgical History:   Procedure Laterality Date    BIOPSY      COLONOSCOPY  3/2009    COLONOSCOPY N/A 3/8/2017    Procedure: COMBINED COLONOSCOPY, SINGLE OR MULTIPLE BIOPSY/POLYPECTOMY BY BIOPSY;  Surgeon: Yung Maguire MD;  Location:  GI    COLONOSCOPY Left 3/3/2022    Procedure: COLONOSCOPY, WITH BIOPSY using cold bx forcep;  Surgeon: Yung Maguire MD;  Location:  GI    CYSTOSCOPY, TRANSURETHRAL RESECTION (TUR) PROSTATE, COMBINED N/A 3/24/2021    Procedure: Cystoscopy, transurethral resection of the prostate;  Surgeon: Yosi Campbell MD;  Location:  OR    ENT SURGERY      EXCISE LESION TRUNK N/A 2/3/2017    Procedure: EXCISE LESION TRUNK;  Surgeon: Jaswinder Lopez MD;  Location: [facility]    EXCISE LESION UPPER EXTREMITY Left 2/3/2017    Procedure: EXCISE LESION UPPER EXTREMITY;  Surgeon: Jaswinder Lopez MD;  Location: [facility]    HEAD & NECK SURGERY      HERNIA REPAIR Left 2002    ORTHOPEDIC SURGERY  2002    torn ligament right shoulder    SOFT TISSUE SURGERY  12/6/10    excisional biopsy of tongue lesion    tonsillectomy  Age 6       ALLERGIES   No Known Allergies    FAMILY HISTORY  Family History   Problem Relation Age of Onset    Cerebrovascular Disease Mother     Hypertension Mother     Respiratory Father         heavy smoker    Cancer Maternal Grandfather         lip  cancer--heavy smoker    Unknown/Adopted Son     Unknown/Adopted Daughter              SOCIAL HISTORY  Social History     Socioeconomic History    Marital status:      Spouse name: Not on file    Number of children: Not on file    Years of education: Not on file    Highest education level: Not on file   Occupational History    Not on file   Tobacco Use    Smoking status: Former     Current packs/day: 0.00     Average packs/day: 0.3 packs/day for 2.0 years (0.5 ttl pk-yrs)     Types: Cigarettes     Start date: 1976     Quit date: 1978     Years since quittin.7    Smokeless tobacco: Never    Tobacco comments:     smoked socially in college   Substance and Sexual Activity    Alcohol use: Not Currently     Alcohol/week: 0.0 standard drinks of alcohol    Drug use: No    Sexual activity: Yes     Partners: Female   Other Topics Concern    Parent/sibling w/ CABG, MI or angioplasty before 65F 55M? No   Social History Narrative    Not on file     Social Determinants of Health     Financial Resource Strain: Low Risk  (2024)    Financial Resource Strain     Within the past 12 months, have you or your family members you live with been unable to get utilities (heat, electricity) when it was really needed?: No   Food Insecurity: Low Risk  (2024)    Food Insecurity     Within the past 12 months, did you worry that your food would run out before you got money to buy more?: No     Within the past 12 months, did the food you bought just not last and you didn t have money to get more?: No   Transportation Needs: Low Risk  (2024)    Transportation Needs     Within the past 12 months, has lack of transportation kept you from medical appointments, getting your medicines, non-medical meetings or appointments, work, or from getting things that you need?: No   Physical Activity: Insufficiently Active (2023)    Received from [facility], [facility]    Exercise Vital Sign     Days of Exercise per Week: 3  days     Minutes of Exercise per Session: 20 min   Stress: No Stress Concern Present (7/30/2024)    [employer] [place] - Occupational Stress Questionnaire     Feeling of Stress : Not at all   Social Connections: Unknown (7/30/2024)    Social Connection and Isolation Panel [NHANES]     Frequency of Communication with Friends and Family: Not on file     Frequency of Social Gatherings with Friends and Family: Twice a week     Attends Christian Services: Not on file     Active Member of Clubs or Organizations: Not on file     Attends Club or Organization Meetings: Not on file     Marital Status: Not on file   Interpersonal Safety: Low Risk  (7/30/2024)    Interpersonal Safety     Do you feel physically and emotionally safe where you currently live?: Yes     Within the past 12 months, have you been hit, slapped, kicked or otherwise physically hurt by someone?: No     Within the past 12 months, have you been humiliated or emotionally abused in other ways by your partner or ex-partner?: No   Housing Stability: Low Risk  (7/30/2024)    Housing Stability     Do you have housing? : Yes     Are you worried about losing your housing?: No       ROS:   General: No change in weight, sleep or appetite.  Normal energy.  No fever or chills  Eyes: Negative for vision changes or eye problems  ENT: Bilateral mixed hearing loss  Resp: No coughing, wheezing or shortness of breath  CV: No chest pains or palpitations  GI: No nausea, vomiting,  heartburn, abdominal pain, diarrhea, constipation or change in bowel habits  : No urinary frequency or dysuria, bladder or kidney problems  Musculoskeletal: No significant muscle or joint pains  Neurologic: History of neuropathy  Psychiatric: No problems with anxiety, depression or mental health  Heme/immune/allergy: No history of bleeding or clotting problems or anemia.  No allergies or immune system problems  Endocrine: No history of thyroid disease, diabetes or other  endocrine disorders  Skin: No rashes,worrisome lesions or skin problems  Vascular:  No claudication, lifestyle limiting or otherwise; no ischemic rest pain; no non-healing ulcers. No weakness, No loss of sensation      EXAM:  /73 (BP Location: Left arm, Patient Position: Sitting, Cuff Size: Adult Regular)   Pulse 71   Wt 220 lb 12.8 oz (100.2 kg)   SpO2 98%   BMI 29.95 kg/m    In general, the patient is a pleasant male in no apparent distress.    HEENT: NC/AT.  PERRLA.  EOMI.  Neck: No adenopathy.  No thyromegaly. Carotids , appreciable right-sided carotid bruit.  No jugular venous distension.   Heart: RRR, no murmurs no gallop no rub  Lungs: CTA.  No ronchi, wheezes, rales.  No dullness to percussion.   Abdomen: Soft, nontender, nondistended. No organomegaly  No bruits.   Extremities: Vascular :  Right-sided carotid bruit  Bilateral symmetrical palpable peripheral pulses  No leg edema  No evidence of venous insufficiency or arterial insufficiency      Labs:  LIPID RESULTS:  Lab Results   Component Value Date    CHOL 148 07/30/2024    CHOL 125 10/21/2020    HDL 43 07/30/2024    HDL 43 10/21/2020    LDL 90 07/30/2024    LDL 57 10/21/2020    TRIG 75 07/30/2024    TRIG 123 10/21/2020    CHOLHDLRATIO 3.6 01/28/2014    CHOLHDLRATIO 2.9 08/09/2012       LIVER ENZYME RESULTS:  Lab Results   Component Value Date    AST 35 07/30/2024    AST 14 09/25/2020    ALT 36 07/30/2024    ALT 30 09/25/2020       CBC RESULTS:  Lab Results   Component Value Date    WBC 6.9 07/30/2024    WBC 9.8 09/25/2020    RBC 5.74 07/30/2024    RBC 5.52 09/25/2020    HGB 17.0 07/30/2024    HGB 16.4 03/24/2021    HCT 49.1 07/30/2024    HCT 47.9 09/25/2020    MCV 86 07/30/2024    MCV 87 09/25/2020    MCH 29.6 07/30/2024    MCH 29.9 09/25/2020    MCHC 34.6 07/30/2024    MCHC 34.4 09/25/2020    RDW 12.5 07/30/2024    RDW 13.2 09/25/2020     07/30/2024     09/25/2020       BMP RESULTS:  Lab Results   Component Value Date      07/30/2024     09/25/2020    POTASSIUM 3.7 07/30/2024    POTASSIUM 4.1 02/03/2022    POTASSIUM 3.8 03/24/2021    CHLORIDE 97 (L) 07/30/2024    CHLORIDE 103 02/03/2022    CHLORIDE 104 09/25/2020    CO2 26 07/30/2024    CO2 29 02/03/2022    CO2 24 09/25/2020    ANIONGAP 14 07/30/2024    ANIONGAP 4 02/03/2022    ANIONGAP 9 09/25/2020     (H) 07/30/2024     (H) 02/03/2022     (H) 09/25/2020    BUN 15.8 07/30/2024    BUN 12 02/03/2022    BUN 15 09/25/2020    CR 0.90 07/30/2024    CR 0.80 03/24/2021    GFRESTIMATED >90 07/30/2024    GFRESTIMATED >90 03/24/2021    GFRESTBLACK >90 03/24/2021    MARGARITA 9.4 07/30/2024    MARGARITA 9.3 09/25/2020        A1C RESULTS:  Lab Results   Component Value Date    A1C 5.4 07/30/2024    A1C 5.2 08/09/2012       THYROID RESULTS:  Lab Results   Component Value Date    TSH 2.48 02/03/2022    TSH 3.33 10/21/2020       Procedures:     [facility]  DATE: 8/29/2024     INDICATION: Right carotid bruit  TECHNIQUE: Duplex exam performed utilizing 2D gray-scale imaging,  Doppler interrogation with color-flow and spectral waveform analysis.  COMPARISON: None.     FINDINGS:  RIGHT: There is a moderate amount of atheromatous plaque within the  right common carotid artery. There is mild amount of atheromatous  plaque within the right internal carotid artery     LEFT: There is a mild amount of atheromatous plaque.      The vertebral artery waveforms are normal.     VELOCITY CHART:   The following velocities were obtained in the RIGHT carotid system.  CCA: 264 cm/s  ICA: 146 cm/s  ECA: 382 cm/s  ICA/CCA: PS 0.6     The following velocities were obtained in the LEFT carotid system.  CCA: 126 cm/s  ICA: 96 cm/s  ECA: 93 cm/s  ICA/CCA: PS 1.0                           CONCLUSION:  1.  On the right there is a mild amount of atheromatous plaque within  the internal carotid artery.  Peak systolic velocities in the ICA are  146 cm/s which correspond to the 50-69% stenosis range based on  NASCET  criteria.  2.  Moderate amount of atheromatous plaque within the right common  carotid artery. Peak systolic velocity within the right CCA is 264  cm/s.  3.  Elevated velocities within the proximal right external carotid  artery (382 cm/s).  4.  On the left there is a mild amount of atheromatous plaque.  Peak  systolic velocities in the ICA are 96 cm/s which correspond to the  less than 50% stenosis range based on NASCET criteria.  5.  Antegrade flow within the vertebral arteries bilaterally.     Evaluation based on velocities and NASCET criteria.     ERROL CONDE MD         SYSTEM ID:  [id number]      Assessment and Plan:     1. Carotid stenosis, asymptomatic, right CCA 50 to 69% stenosis with moderate plaque and mild stenosis on the left side less than 50% and mild plaque    2. HX Squamous cell carcinoma of tongue (H) status post surgery and radiation in 2011     - Vascular Surgery Referral; Future    2. Hyperlipidemia LDL goal <70    3. Benign essential hypertension    This is a very pleasant 73-year-old male with asymptomatic right-sided carotid stenosis.  He has a history of a squamous cell carcinoma of the tongue underwent multiple radiation treatments in 2011 and he was a former smoker.  Risk factors are he is 73 years old, hypertension well-controlled hyperlipidemia recently adjusted atorvastatin dose and he is not on aspirin.  He is worried about the risk of stroke.  He denies any chest pain, shortness of breath or palpitations  Reviewed carotid ultrasound with patient and his wife who accompanied him today  CTA of head and neck was ordered by Dr. Rodríguez and he has not scheduled yet suggested him to call and schedule CTA and if needed call our office to schedule the test.  He and his wife preferred to see  .  His wife worked within the [employer] system and retired last year.  Follow-up with me after vascular surgery evaluation  He was also having positional dizziness from sitting to  standing suggested patient to take irbesartan at bedtime and amlodipine in the morning if still dizziness may decrease the dose of irbesartan and take half a tablet daily    Take baby aspirin 81 mg daily with food over-the-counter    If he develops any issues with increased dose of atorvastatin which can cause DDI with amlodipine    we can switch to rosuvastatin 40 mg daily.    60  minutes spent on the date of the encounter doing chart review, history and exam, documentation, and further activities as noted above.    The longitudinal care of plan for the above diagnoses was addressed during this visit. Due to added complexity of care, we will continue to supprt Chrystal Solis and the subsequent management of this/these conditions and with ongoing continuity of care for this/these conditions.     Thank you for the consultation  This note was dictated by utilizing Dragon software  Copy of this note to primary care physician    Yaritza Alanis MD,BRENDON,FSVM,FNLA, FACP  Vascular Medicine  Clinical Hypertension Specialist   Clinical Lipidologist

## 2024-09-13 ENCOUNTER — TELEPHONE (OUTPATIENT)
Dept: OTHER | Facility: CLINIC | Age: 74
End: 2024-09-13
Payer: COMMERCIAL

## 2024-09-13 NOTE — TELEPHONE ENCOUNTER
Referral received via [employer] on 9/10/24.    Pt referred to [facility] by Dr. Alanis for carotid stenosis.    Routing to scheduling to coordinate the following:  NEW VASCULAR PATIENT consult with Dr. Orozco (per referring notes)  Please schedule this  after the currently scheduled 9/20/24 CTA head/neck , at next available    Appt note:  Ref by Dr. Alanis for carotid stenosis; notes and imaging in Epic; CTA scheduled 9/20/24, consult to be scheduled after this is completed.    Martha Bell, OLIVIERN, RN, -[facility] [place]

## 2024-09-18 DIAGNOSIS — I10 BENIGN ESSENTIAL HYPERTENSION: ICD-10-CM

## 2024-09-19 RX ORDER — AMLODIPINE BESYLATE 10 MG/1
10 TABLET ORAL DAILY
Qty: 90 TABLET | Refills: 3 | OUTPATIENT
Start: 2024-09-19

## 2024-09-20 ENCOUNTER — HOSPITAL ENCOUNTER (OUTPATIENT)
Dept: CT IMAGING | Facility: CLINIC | Age: 74
Discharge: HOME | End: 2024-09-20
Attending: INTERNAL MEDICINE | Admitting: INTERNAL MEDICINE
Payer: COMMERCIAL

## 2024-09-20 DIAGNOSIS — I65.22 LEFT CAROTID STENOSIS: ICD-10-CM

## 2024-09-20 PROCEDURE — 250N000009 HC RX 250: Performed by: INTERNAL MEDICINE

## 2024-09-20 PROCEDURE — 70496 CT ANGIOGRAPHY HEAD: CPT

## 2024-09-20 PROCEDURE — 250N000011 HC RX IP 250 OP 636: Performed by: INTERNAL MEDICINE

## 2024-09-20 RX ORDER — IOPAMIDOL 755 MG/ML
67 INJECTION, SOLUTION INTRAVASCULAR ONCE
Status: COMPLETED | OUTPATIENT
Start: 2024-09-20 | End: 2024-09-20

## 2024-09-20 RX ADMIN — IOPAMIDOL 67 ML: 755 INJECTION, SOLUTION INTRAVENOUS at 10:11

## 2024-09-20 RX ADMIN — SODIUM CHLORIDE 100 ML: 9 INJECTION, SOLUTION INTRAVENOUS at 10:12

## 2024-09-20 NOTE — RESULT ENCOUNTER NOTE
The following letter pertains to your most recent diagnostic tests:    Good news! The CT does NOT show any high grade carotid artery stenosis (narrowing).  I recommend aggressively addressing your risk factors for vascular disease including your blood pressure and cholesterol as we are.  We should take another picture of the arteries by ultrasound at your next annual check up.         Sincerely,    Dr. Rodríguez

## 2024-10-16 NOTE — PROGRESS NOTES
[facility]    Chrystal Solis is a healthy active 73-year-old patient followed by Dr. Alanis was found to have asymptomatic carotid stenosis.    Patient was noted to have bruits by Dr. Rodríguez.  Past history significant for squamous cell cancer of the tongue.  He underwent a radiation in 2011 with no recurrence.  No surgery was necessary.    PMH: Medications: Avapro, Norvasc, HCTZ, Lipitor, started aspirin recently   Medical: Hypertension    Hyperlipidemia on statin-LDL= 90    History of squamous cell cancer of the tongue-2011    Polyneuropathy  History elevated PSV= 4.89.  History of bladder tumor treated by Dr. Campbell of urology and has scheduled follow-up    7/30/2024 laboratory: K= 3.7   SCr= 0.90 A1c= 5.4 Hgb= 17.0    ROS: Has intentionally lost 20 pounds over the last 6 to 8 months with exercise and diet.  He walks 3-1/2 miles every other day with no claudication symptoms or chest discomfort.    FMH: Mother had a cerebral bleed.    Social: Lives independently with his wife who is an RN in [facility] which is approximately 30 miles from [facility].    --8/29/2024 carotid duplex:   Right CCA PSV= 264 cm/s.  ICA PSV= 146 cm/s ECA= 382  Left ICA PSV= 96 cm/s        --9/20/2024 CTA: Plaque stenosis right common carotid artery and both internal carotid arteries with no high-grade stenosis appreciated.  Normal vertebral arteries.  Normal intracerebral CTA findings.    Exam: Alert and appropriate.  Normal affect.  Here with his wife.  Talkative.   Blood pressure 144/81 left  (somewhat higher for him).  Pulse 61 regular  HEENT: Fairly soft mobile right neck.  Well-developed external jugular vein.  Thin.    Mild right carotid bruit and soft left.  Chest= clear  Cardiovascular= regular rate with no murmur  Extremities= unremarkable.  No swelling.  Normal sensation.    +3 PT pulses bilaterally.    I went over the duplex images and also the CTA.  Very minimal disease within the left  carotid system.  There is narrowing of the right common carotid artery most likely due to atherosclerosis (much more likely due to post irradiation changes) that is fairly smooth.  There is disease in the external carotid artery explaining the high velocities.  Mild disease is noted within a tortuous ICA approximately the centimeter from the origin.  By NASCET criteria stenosis is definitely less than 50% in both common and ICA.  Vertebral arteries are unremarkable.  No significant intracerebral disease nor aneurysms.    IMPRESSION:   #1.  Moderate changes within the right carotid bulb and ICA and ECA.  At the present findings I do not feel this is clinically significant and would not recommend surgery.  Discussed the symptoms that could be associated with this lesion and if this occurs prompt evaluation is indicated.  He is just started aspirin and this could be appropriate to continue.  Would recommend right carotid duplex only in 6 months here in the clinic with follow-up to see if there is progression.  If any symptoms develop or worsening would then consider an open carotid endarterectomy.  With his anatomical findings a statin would not be appropriate.  This was discussed at length.    #2.  Aware of the importance of good risk factor control.  LDL is fairly good less than 100 on Lipitor which she will continue.  He has a very healthy diet and is quite active with normal exercise.  No evidence of PAD.    45 minutes with patient today including chart review and going over images and recommendations.    Pascual Orozco MD   This note was created using Dragon voice recognition software which may result in transcription errors.      CC: Dr. Lam Alanis

## 2024-10-17 ENCOUNTER — OFFICE VISIT (OUTPATIENT)
Dept: OTHER | Facility: CLINIC | Age: 74
End: 2024-10-17
Attending: SURGERY
Payer: COMMERCIAL

## 2024-10-17 VITALS — OXYGEN SATURATION: 98 % | HEART RATE: 61 BPM | DIASTOLIC BLOOD PRESSURE: 81 MMHG | SYSTOLIC BLOOD PRESSURE: 144 MMHG

## 2024-10-17 DIAGNOSIS — E78.5 HYPERLIPIDEMIA LDL GOAL <100: Primary | ICD-10-CM

## 2024-10-17 DIAGNOSIS — I65.21 CAROTID STENOSIS, ASYMPTOMATIC, RIGHT: ICD-10-CM

## 2024-10-17 PROCEDURE — 99204 OFFICE O/P NEW MOD 45 MIN: CPT | Performed by: SURGERY

## 2024-10-17 PROCEDURE — G0463 HOSPITAL OUTPT CLINIC VISIT: HCPCS | Performed by: SURGERY

## 2024-10-17 RX ORDER — ASPIRIN 81 MG/1
81 TABLET ORAL DAILY
COMMUNITY

## 2024-10-17 NOTE — PROGRESS NOTES
Patient is here to discuss consult.    There were no vitals taken for this visit.    Questions patient would like addressed today are: N/A.    Refills are needed: N/A    Has homecare services and agency name:  Karoline Gee RN

## 2024-11-08 ENCOUNTER — TELEPHONE (OUTPATIENT)
Dept: FAMILY MEDICINE | Facility: CLINIC | Age: 74
End: 2024-11-08
Payer: COMMERCIAL

## 2024-11-08 NOTE — TELEPHONE ENCOUNTER
Pt calling stating that he was never called for follow up with vascular. Look like to follow up in 6 months         Please assist pt with scheduling.     Mercy Brambila RN

## 2024-11-08 NOTE — TELEPHONE ENCOUNTER
Patient saw Dr. Orozco on 10/17/24 with plan for follow up in 6 months with a carotid US. [employer] message sent to patient.    Shira ELIZONDO, ANA    [facility]  [facility]  Office: [phone]  Fax: [phone]

## 2024-11-29 ENCOUNTER — TRANSFERRED RECORDS (OUTPATIENT)
Dept: HEALTH INFORMATION MANAGEMENT | Facility: CLINIC | Age: 74
End: 2024-11-29
Payer: COMMERCIAL

## 2024-12-11 ENCOUNTER — TRANSFERRED RECORDS (OUTPATIENT)
Dept: HEALTH INFORMATION MANAGEMENT | Facility: CLINIC | Age: 74
End: 2024-12-11
Payer: COMMERCIAL

## 2024-12-31 DIAGNOSIS — I10 BENIGN ESSENTIAL HYPERTENSION: ICD-10-CM

## 2024-12-31 RX ORDER — HYDROCHLOROTHIAZIDE 25 MG/1
TABLET ORAL
Qty: 90 TABLET | Refills: 3 | OUTPATIENT
Start: 2024-12-31

## 2024-12-31 RX ORDER — HYDROCHLOROTHIAZIDE 25 MG/1
TABLET ORAL
Qty: 90 TABLET | Refills: 0 | Status: SHIPPED | OUTPATIENT
Start: 2024-12-31

## 2024-12-31 NOTE — TELEPHONE ENCOUNTER
To PCP:    Patient's refill for the Hydrochlorothiazide was denied on 12/31.     Patient calling stating he only has two pills left and needs the refill.    Medication and pharmacy pended for Provider to review.    Thank you.    Rolan Guardado RN  [facility] Internal Medicine

## 2025-02-06 ENCOUNTER — OFFICE VISIT (OUTPATIENT)
Dept: UROLOGY | Facility: CLINIC | Age: 75
End: 2025-02-06
Payer: COMMERCIAL

## 2025-02-06 VITALS
SYSTOLIC BLOOD PRESSURE: 116 MMHG | HEART RATE: 75 BPM | HEIGHT: 72 IN | BODY MASS INDEX: 26.68 KG/M2 | DIASTOLIC BLOOD PRESSURE: 78 MMHG | WEIGHT: 197 LBS | OXYGEN SATURATION: 97 %

## 2025-02-06 DIAGNOSIS — Z85.51 PERSONAL HISTORY OF MALIGNANT NEOPLASM OF BLADDER: Primary | ICD-10-CM

## 2025-02-06 DIAGNOSIS — N40.0 ENLARGED PROSTATE: ICD-10-CM

## 2025-02-06 LAB
ALBUMIN UR-MCNC: NEGATIVE MG/DL
APPEARANCE UR: CLEAR
BILIRUB UR QL STRIP: NEGATIVE
COLOR UR AUTO: YELLOW
GLUCOSE UR STRIP-MCNC: NEGATIVE MG/DL
HGB UR QL STRIP: NEGATIVE
KETONES UR STRIP-MCNC: NEGATIVE MG/DL
LEUKOCYTE ESTERASE UR QL STRIP: NEGATIVE
NITRATE UR QL: NEGATIVE
PH UR STRIP: 7 [PH] (ref 5–7)
SP GR UR STRIP: 1.01 (ref 1–1.03)
UROBILINOGEN UR STRIP-ACNC: 0.2 E.U./DL

## 2025-02-06 RX ORDER — LIDOCAINE HYDROCHLORIDE 20 MG/ML
JELLY TOPICAL ONCE
Status: COMPLETED | OUTPATIENT
Start: 2025-02-06 | End: 2025-02-06

## 2025-02-06 RX ADMIN — LIDOCAINE HYDROCHLORIDE 5 ML: 20 JELLY TOPICAL at 13:20

## 2025-02-06 NOTE — PROGRESS NOTES
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  His most recent PSA was 4.89 in July.  He has no urinary symptoms or complaints at this time.      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       PAST SURGICAL HISTORY:   Past Surgical History:   Procedure Laterality Date    BIOPSY      COLONOSCOPY  3/2009    COLONOSCOPY N/A 3/8/2017    Procedure: COMBINED COLONOSCOPY, SINGLE OR MULTIPLE BIOPSY/POLYPECTOMY BY BIOPSY;  Surgeon: Yung Maguire MD;  Location:  GI    COLONOSCOPY Left 3/3/2022    Procedure: COLONOSCOPY, WITH BIOPSY using cold bx forcep;  Surgeon: Yung Maguire MD;  Location:  GI    CYSTOSCOPY, TRANSURETHRAL RESECTION (TUR) PROSTATE, COMBINED N/A 3/24/2021    Procedure: Cystoscopy, transurethral resection of the prostate;  Surgeon: Yosi Campbell MD;  Location:  OR    ENT SURGERY      EXCISE LESION TRUNK N/A 2/3/2017    Procedure: EXCISE LESION TRUNK;  Surgeon: Jaswinder Lopez MD;  Location: [facility]    EXCISE LESION UPPER EXTREMITY Left 2/3/2017    Procedure: EXCISE LESION UPPER EXTREMITY;  Surgeon: Jaswinder Lopez MD;  Location: [facility]    HEAD & NECK SURGERY       HERNIA REPAIR Left 2002    ORTHOPEDIC SURGERY  2002    torn ligament right shoulder    SOFT TISSUE SURGERY  12/6/10    excisional biopsy of tongue lesion    tonsillectomy  Age 6       FAMILY HISTORY:   Family History   Problem Relation Age of Onset    Cerebrovascular Disease Mother     Hypertension Mother     Respiratory Father         heavy smoker    Cancer Maternal Grandfather         lip cancer--heavy smoker    Unknown/Adopted Son     Unknown/Adopted Daughter        SOCIAL HISTORY:   Social History     Socioeconomic History    Marital status:    Tobacco Use    Smoking status: Former     Current packs/day: 0.00     Average packs/day: 0.3 packs/day for 2.0 years (0.5 ttl pk-yrs)     Types: Cigarettes     Start date: 1976     Quit date: 1978     Years since quittin.1    Smokeless tobacco: Never    Tobacco comments:     smoked socially in college   Substance and Sexual Activity    Alcohol use: Not Currently     Alcohol/week: 0.0 standard drinks of alcohol    Drug use: No    Sexual activity: Yes     Partners: Female   Other Topics Concern    Parent/sibling w/ CABG, MI or angioplasty before 65F 55M? No     Social Drivers of Health     Financial Resource Strain: Low Risk  (2024)    Financial Resource Strain     Within the past 12 months, have you or your family members you live with been unable to get utilities (heat, electricity) when it was really needed?: No   Food Insecurity: Low Risk  (2024)    Food Insecurity     Within the past 12 months, did you worry that your food would run out before you got money to buy more?: No     Within the past 12 months, did the food you bought just not last and you didn t have money to get more?: No   Transportation Needs: Low Risk  (2024)    Transportation Needs     Within the past 12 months, has lack of transportation kept you from medical appointments, getting your medicines, non-medical meetings or appointments, work, or from getting things that  you need?: No   Physical Activity: Insufficiently Active (4/11/2023)    Received from [facility], [facility]    Exercise Vital Sign     Days of Exercise per Week: 3 days     Minutes of Exercise per Session: 20 min   Stress: No Stress Concern Present (7/30/2024)    [employer] [place] [employer] - Occupational Stress Questionnaire     Feeling of Stress : Not at all   Social Connections: Unknown (7/30/2024)    Social Connection and Isolation Panel [NHANES]     Frequency of Social Gatherings with Friends and Family: Twice a week   Interpersonal Safety: Low Risk  (7/30/2024)    Interpersonal Safety     Do you feel physically and emotionally safe where you currently live?: Yes     Within the past 12 months, have you been hit, slapped, kicked or otherwise physically hurt by someone?: No     Within the past 12 months, have you been humiliated or emotionally abused in other ways by your partner or ex-partner?: No   Housing Stability: Low Risk  (7/30/2024)    Housing Stability     Do you have housing? : Yes     Are you worried about losing your housing?: No       Review Of Systems:  Skin: No rash, pruritis, or skin pigmentation  Eyes: No changes in vision  Ears/Nose/Throat: No changes in hearing, no nosebleeds  Respiratory: No shortness of breath, dyspnea on exertion, cough, or hemoptysis  Cardiovascular: No chest pain or palpitations  Gastrointestinal: No diarrhea or constipation. No abdominal pain. No hematochezia  Genitourinary: see HPI  Musculoskeletal: No pain or swelling of joints, normal range of motion  Neurologic: No weakness or tremors  Psychiatric: No recent changes in memory or mood  Hematologic/Lymphatic/Immunologic: No easy bruising or enlarged lymph nodes  Endocrine: No weight gain or loss      PHYSICAL EXAM:    There were no vitals taken for this visit.    Constitutional: Well developed. Conversant and in no acute distress  Eyes: Anicteric sclera, conjunctiva clear, normal extraocular  movements  ENT: Normocephalic and atraumatic,   Skin: Warm and dry. No rashes or lesions  Cardiac: No peripheral edema  Back/Flank: Not done  CNS/PNS: Normal musculature and movements, moves all extremities normally  Respiratory: Normal non-labored breathing  Abdomen: Soft nontender and nondistended  Peripheral Vascular: No peripheral edema  Mental Status/Psych: Alert and Oriented x 3. Normal mood and affect    Penis: Not done  Scrotal Skin: Not done  Testicles: Not done  Epididymis: Not done  Digital Rectal Exam:     Cystoscopy: I performed flexible cystoscopy and the bladder was normal throughout.  No tumors identified throughout the bladder.  On retroflexion of the scope both lateral lobes were protruding intravesically.  In pulling back the scope he had bilateral enlarged and obstructing lateral lobes.    Imaging: None    Urinalysis: UA RESULTS:  Recent Labs   Lab Test 07/30/24  1527 10/21/20  0956 09/25/20  1538   COLOR Yellow   < > Yellow   APPEARANCE Clear   < > Clear   URINEGLC Negative   < > Negative   URINEBILI Negative   < > Small*   URINEKETONE Negative   < > Trace*   SG 1.015   < > 1.020   UBLD Negative   < > Moderate*   URINEPH 7.0   < > 6.0   PROTEIN Negative   < > 100*   UROBILINOGEN 0.2   < > 2.0*   NITRITE Negative   < > Negative   LEUKEST Negative   < > Small*   RBCU  --   --  5-10*   WBCU  --   --  25-50*    < > = values in this interval not displayed.       PSA: 4.89    Post Void Residual:     Other labs: None today      IMPRESSION:  Doing well    PLAN:  It has now been 4 years since he had a benign papilloma of the urothelium identified.  His cystoscopy remains normal.  I counseled him that he does not need any more screening cystoscopies unless he should develop hematuria in the future.  I recommended he continue with annual PSA checking into the age of 76 with his primary care provider.  He is welcome to follow-up with me as needed in the future.      Yosi Campbell M.D.

## 2025-02-06 NOTE — NURSING NOTE
Chief Complaint   Patient presents with    Personal history of malignant neoplasm of bladder     Patient here today for Cystoscopy with Dr Campbell                Prior to the start of the procedure and with procedural staff participation, I verbally confirmed the patient s identity using two indicators, relevant allergies, that the procedure was appropriate and matched the consent or emergent situation, and that the correct equipment/implants were available. Immediately prior to starting the procedure I conducted the Time Out with the procedural staff and re-confirmed the patient s name, procedure, and site/side. I have wiped the patient off with the povidone-Iodine solution, draped them,  used Lidocaine hydrochloride jelly, and instilled sterile water into the bladder. (The Joint Commission universal protocol was followed.)  Yes    Sedation (Moderate or Deep): None    5mL 2% lidocaine hydrochloride Urojet instilled into urethra.    NDC# [address]-0  Lot #: [id number]  Expiration Date:  06/26        TIKI Gibbons

## 2025-02-06 NOTE — LETTER
2/6/2025       RE: Chrystal Solis  [address] [place] [address]     Dear Colleague,    Thank you for referring your patient, Chrystal Solis, to the [facility]. Please see a copy of my visit note below.    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  His most recent PSA was 4.89 in July.  He has no urinary symptoms or complaints at this time.      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       PAST SURGICAL HISTORY:   Past Surgical History:   Procedure Laterality Date     BIOPSY       COLONOSCOPY  3/2009     COLONOSCOPY N/A 3/8/2017    Procedure: COMBINED COLONOSCOPY, SINGLE OR MULTIPLE BIOPSY/POLYPECTOMY BY BIOPSY;  Surgeon: Yung Maguire MD;  Location:  GI     COLONOSCOPY Left 3/3/2022    Procedure: COLONOSCOPY, WITH BIOPSY using cold bx forcep;  Surgeon: Yung Maguire MD;  Location:  GI     CYSTOSCOPY, TRANSURETHRAL RESECTION (TUR) PROSTATE, COMBINED N/A 3/24/2021    Procedure: Cystoscopy, transurethral resection of the prostate;  Surgeon: Yosi Campbell MD;   Location: [facility] OR     ENT SURGERY       EXCISE LESION TRUNK N/A 2/3/2017    Procedure: EXCISE LESION TRUNK;  Surgeon: Jaswinder Lopez MD;  Location:  [facility]     EXCISE LESION UPPER EXTREMITY Left 2/3/2017    Procedure: EXCISE LESION UPPER EXTREMITY;  Surgeon: Jaswinder Lopez MD;  Location: [facility]     HEAD & NECK SURGERY       HERNIA REPAIR Left      ORTHOPEDIC SURGERY  2002    torn ligament right shoulder     SOFT TISSUE SURGERY  12/6/10    excisional biopsy of tongue lesion     tonsillectomy  Age 6       FAMILY HISTORY:   Family History   Problem Relation Age of Onset     Cerebrovascular Disease Mother      Hypertension Mother      Respiratory Father         heavy smoker     Cancer Maternal Grandfather         lip cancer--heavy smoker     Unknown/Adopted Son      Unknown/Adopted Daughter        SOCIAL HISTORY:   Social History     Socioeconomic History     Marital status:    Tobacco Use     Smoking status: Former     Current packs/day: 0.00     Average packs/day: 0.3 packs/day for 2.0 years (0.5 ttl pk-yrs)     Types: Cigarettes     Start date: 1976     Quit date: 1978     Years since quittin.1     Smokeless tobacco: Never     Tobacco comments:     smoked socially in college   Substance and Sexual Activity     Alcohol use: Not Currently     Alcohol/week: 0.0 standard drinks of alcohol     Drug use: No     Sexual activity: Yes     Partners: Female   Other Topics Concern     Parent/sibling w/ CABG, MI or angioplasty before 65F 55M? No     Social Drivers of Health     Financial Resource Strain: Low Risk  (2024)    Financial Resource Strain      Within the past 12 months, have you or your family members you live with been unable to get utilities (heat, electricity) when it was really needed?: No   Food Insecurity: Low Risk  (2024)    Food Insecurity      Within the past 12 months, did you worry that your food would run out before you got money to buy more?: No      Within the  past 12 months, did the food you bought just not last and you didn t have money to get more?: No   Transportation Needs: Low Risk  (7/30/2024)    Transportation Needs      Within the past 12 months, has lack of transportation kept you from medical appointments, getting your medicines, non-medical meetings or appointments, work, or from getting things that you need?: No   Physical Activity: Insufficiently Active (4/11/2023)    Received from [facility], [facility]    Exercise Vital Sign      Days of Exercise per Week: 3 days      Minutes of Exercise per Session: 20 min   Stress: No Stress Concern Present (7/30/2024)    [place] [employer] [employer] - Occupational Stress Questionnaire      Feeling of Stress : Not at all   Social Connections: Unknown (7/30/2024)    Social Connection and Isolation Panel [NHANES]      Frequency of Social Gatherings with Friends and Family: Twice a week   Interpersonal Safety: Low Risk  (7/30/2024)    Interpersonal Safety      Do you feel physically and emotionally safe where you currently live?: Yes      Within the past 12 months, have you been hit, slapped, kicked or otherwise physically hurt by someone?: No      Within the past 12 months, have you been humiliated or emotionally abused in other ways by your partner or ex-partner?: No   Housing Stability: Low Risk  (7/30/2024)    Housing Stability      Do you have housing? : Yes      Are you worried about losing your housing?: No       Review Of Systems:  Skin: No rash, pruritis, or skin pigmentation  Eyes: No changes in vision  Ears/Nose/Throat: No changes in hearing, no nosebleeds  Respiratory: No shortness of breath, dyspnea on exertion, cough, or hemoptysis  Cardiovascular: No chest pain or palpitations  Gastrointestinal: No diarrhea or constipation. No abdominal pain. No hematochezia  Genitourinary: see HPI  Musculoskeletal: No pain or swelling of joints, normal range of motion  Neurologic: No weakness or  tremors  Psychiatric: No recent changes in memory or mood  Hematologic/Lymphatic/Immunologic: No easy bruising or enlarged lymph nodes  Endocrine: No weight gain or loss      PHYSICAL EXAM:    There were no vitals taken for this visit.    Constitutional: Well developed. Conversant and in no acute distress  Eyes: Anicteric sclera, conjunctiva clear, normal extraocular movements  ENT: Normocephalic and atraumatic,   Skin: Warm and dry. No rashes or lesions  Cardiac: No peripheral edema  Back/Flank: Not done  CNS/PNS: Normal musculature and movements, moves all extremities normally  Respiratory: Normal non-labored breathing  Abdomen: Soft nontender and nondistended  Peripheral Vascular: No peripheral edema  Mental Status/Psych: Alert and Oriented x 3. Normal mood and affect    Penis: Not done  Scrotal Skin: Not done  Testicles: Not done  Epididymis: Not done  Digital Rectal Exam:     Cystoscopy: I performed flexible cystoscopy and the bladder was normal throughout.  No tumors identified throughout the bladder.  On retroflexion of the scope both lateral lobes were protruding intravesically.  In pulling back the scope he had bilateral enlarged and obstructing lateral lobes.    Imaging: None    Urinalysis: UA RESULTS:  Recent Labs   Lab Test 07/30/24  1527 10/21/20  0956 09/25/20  1538   COLOR Yellow   < > Yellow   APPEARANCE Clear   < > Clear   URINEGLC Negative   < > Negative   URINEBILI Negative   < > Small*   URINEKETONE Negative   < > Trace*   SG 1.015   < > 1.020   UBLD Negative   < > Moderate*   URINEPH 7.0   < > 6.0   PROTEIN Negative   < > 100*   UROBILINOGEN 0.2   < > 2.0*   NITRITE Negative   < > Negative   LEUKEST Negative   < > Small*   RBCU  --   --  5-10*   WBCU  --   --  25-50*    < > = values in this interval not displayed.       PSA: 4.89    Post Void Residual:     Other labs: None today      IMPRESSION:  Doing well    PLAN:  It has now been 4 years since he had a benign papilloma of the urothelium  identified.  His cystoscopy remains normal.  I counseled him that he does not need any more screening cystoscopies unless he should develop hematuria in the future.  I recommended he continue with annual PSA checking into the age of 76 with his primary care provider.  He is welcome to follow-up with me as needed in the future.      Yosi Campbell M.D.              Again, thank you for allowing me to participate in the care of your patient.      Sincerely,    Yosi Campbell MD

## 2025-03-20 ENCOUNTER — LAB (OUTPATIENT)
Dept: LAB | Facility: CLINIC | Age: 75
End: 2025-03-20
Payer: COMMERCIAL

## 2025-03-20 ENCOUNTER — TELEPHONE (OUTPATIENT)
Dept: UROLOGY | Facility: CLINIC | Age: 75
End: 2025-03-20
Payer: COMMERCIAL

## 2025-03-20 DIAGNOSIS — R30.0 DYSURIA: ICD-10-CM

## 2025-03-20 DIAGNOSIS — R30.0 DYSURIA: Primary | ICD-10-CM

## 2025-03-20 LAB
ALBUMIN UR-MCNC: 30 MG/DL
APPEARANCE UR: ABNORMAL
BILIRUB UR QL STRIP: NEGATIVE
COLOR UR AUTO: YELLOW
GLUCOSE UR STRIP-MCNC: NEGATIVE MG/DL
HGB UR QL STRIP: ABNORMAL
KETONES UR STRIP-MCNC: NEGATIVE MG/DL
LEUKOCYTE ESTERASE UR QL STRIP: ABNORMAL
NITRATE UR QL: NEGATIVE
PH UR STRIP: 7 [PH] (ref 5–7)
SP GR UR STRIP: 1.01 (ref 1–1.03)
UROBILINOGEN UR STRIP-ACNC: 0.2 E.U./DL

## 2025-03-20 PROCEDURE — 81003 URINALYSIS AUTO W/O SCOPE: CPT | Mod: QW

## 2025-03-20 PROCEDURE — 87088 URINE BACTERIA CULTURE: CPT

## 2025-03-20 PROCEDURE — 87086 URINE CULTURE/COLONY COUNT: CPT

## 2025-03-20 NOTE — TELEPHONE ENCOUNTER
Health Call Center    Phone Message    May a detailed message be left on voicemail: yes    Reason for Call: Symptoms or Concerns     If patient has red-flag symptoms, warm transfer to triage line    Current symptom or concern: burning with urination, frequent urination, cloudy urine    Symptoms have been present for:  off and on since appt on 2/6/25 with symptoms worsening in the last couple of weeks.    Has patient previously been seen for this? Yes    By : Dr. Campbell    Date: 2/6/25    Are there any new or worsening symptoms? Yes: see above, patient is asking if UA/UC can be done at the [facility]. Patient is requesting a call back. Preferred Pharmacy is [facility] in [facility]    Action Taken: Message routed to:  Other: Urology    Travel Screening: Not Applicable

## 2025-03-20 NOTE — TELEPHONE ENCOUNTER
Spoke with patient, he's been having urinary frequency/urgency and dysuria since his cystoscopy with Dr. Campbell on 2/6/25.  Recommended patient to leave a UA/UC to r/o UTI. Will go to [facility] lab today.  Also recommended that in the future if patient has these symptoms to call us within a few days of experiencing symptoms.  If patient has fever/chills, worsening flank pain and gross hematuria to go to the ED immediately.  Patient states understanding.    Dai Mccormack, RN, BSN  Urology Care Coordinator  [facility]  [phone]

## 2025-03-21 LAB — BACTERIA UR CULT: ABNORMAL

## 2025-04-10 ENCOUNTER — NURSE TRIAGE (OUTPATIENT)
Dept: FAMILY MEDICINE | Facility: CLINIC | Age: 75
End: 2025-04-10
Payer: COMMERCIAL

## 2025-04-10 DIAGNOSIS — B86 SCABIES: Primary | ICD-10-CM

## 2025-04-10 RX ORDER — PERMETHRIN 50 MG/G
CREAM TOPICAL
Qty: 60 G | Refills: 1 | Status: SHIPPED | OUTPATIENT
Start: 2025-04-10

## 2025-04-10 NOTE — TELEPHONE ENCOUNTER
Nurse Triage SBAR    Is this a 2nd Level Triage? NO    Situation: Pt suspects possible scabies and is requesting Rx without an appt.     Background: Pt has localized itching and pinkish dots on the RT and LT hip for 1 week. Pt reports his wife is a nurse and thinks is scabies. They recently traveled and stayed in a hotel. They stayed in separate rooms and wife doesn't have symptoms.     Assessment: Pt wants tx of scabies     Protocol Recommended Disposition:   See More Appropriate Protocol    Recommendation: Pt lives in [facility]. He is unable to do VV since he doesn't have a smart phone. His wife has a smart phone, but is unavailable during call. Pt doesn't wants to see UC. Appt with team tomorrow will not work for pt. Pt wants message sent to PCP with rx request.     Routed to provider Would PCP be willing to prescribe medication for scabies without an appt? Pt needs a call back with providers advice,     Does the patient meet one of the following criteria for ADS visit consideration? No          Reason for Disposition   Insect bites suspected    Additional Information   Negative: Life-threatening reaction (anaphylaxis) in the past to similar substance (e.g., food, insect bite/sting, chemical, etc.) and < 2 hours since exposure   Negative: Difficulty breathing or wheezing   Negative: Difficulty swallowing or slurred speech and sudden onset   Negative: Sounds like a life-threatening emergency to the triager   Negative: Passed out (e.g., fainted, lost consciousness, blacked out and was not responding)   Negative: Wheezing or difficulty breathing   Negative: Hoarseness, cough or tightness in the throat or chest   Negative: Swollen tongue or difficulty swallowing   Negative: Life-threatening reaction (anaphylaxis) in the past to bite from same insect and < 2 hours since bite   Negative: Sounds like a life-threatening emergency to the triager   Negative: Bee sting(s)   Negative: Spider bite(s)   Negative: Tick  bite(s)   Negative: Mosquito bite(s)   Negative: Doesn't sound like an insect bite   Negative: Patient sounds very sick or weak to the triager    Protocols used: Itching - Widespread-A-OH, Insect Bite-A-OH

## 2025-04-10 NOTE — TELEPHONE ENCOUNTER
Please inform him that best practice would be to schedule office visit appointment to accurately diagnose the potential scabies infestation and offer to schedule an office visit appointment.  If he declines our recommendation for an office visit, I did send per prescription for permethrin to his pharmacy

## 2025-04-28 ENCOUNTER — HOSPITAL ENCOUNTER (OUTPATIENT)
Dept: ULTRASOUND IMAGING | Facility: CLINIC | Age: 75
Discharge: HOME | End: 2025-04-28
Attending: SURGERY | Admitting: SURGERY
Payer: COMMERCIAL

## 2025-04-28 DIAGNOSIS — I65.21 CAROTID STENOSIS, ASYMPTOMATIC, RIGHT: ICD-10-CM

## 2025-04-28 PROCEDURE — 93882 EXTRACRANIAL UNI/LTD STUDY: CPT | Mod: RT

## 2025-05-05 NOTE — PROGRESS NOTES
[place] [facility]      Chrystal Solis returns for carotid vascular follow-up.  Patient noted to have asymptomatic carotid bruits by Dr. Rodríguez primary care.  Evaluated by Dr. Alanis of vascular medicine.    --9/20/2024 CTA: Plaque stenosis right common carotid in both internal carotid arteries with no high-grade stenosis.  Duplex: Right CCA PSV= 264 cm/s.  ICA PSV= 146 cm/s.  Normal velocities on the left.      PMH: Medications: Avapro, Norvasc, HCTZ, Lipitor, aspirin   Medical: Hypertension    Hyperlipidemia on statin-7/30/2024 LDL= 90    Squamous cell cancer of tongue with radiation 2011 no recurrence    Bladder tumor treated by Dr. Paula.  Stable.      Polyneuropathy    4/28/2025 right carotid duplex: CCA PSV= 326 cm/s.  ICA PSV= 143 cm/s.  Very elevated velocity ECA at 329 cm/s.        ROS: Significant for numerous changes in dietary to very little cholesterol.  Has decreased 195 pounds with dieting.  No claudication symptoms.  No neurological issues.  Non-smoker    Exam: Alert and appropriate.  Normal affect.  Ambulatory.   Blood pressure 138/62 right arm.  Pulse 61   HEENT= somewhat firm neck but relatively soft on the right.    Loud bruits more prominent on the right    Wears glasses.   Chest= clear    Cardiovascular= regular rate   Extremities= unremarkable.     Reviewed CTA images and duplex images with patient.    IMPRESSION:   #1. Possibly worsening right common carotid artery stenosis.  Likely combination of atherosclerosis plus of radiation changes.  Very little if any disease in the internal carotid artery there with somewhat tortuous in its midportion and by CTA essentially no disease on the left.    This is of some concern and discussed the possibility of surgical intervention.  I would consider open surgery since with his particular lesion stenting I do not feel would be effective so would like to evaluate this further especially considering the CTA images which reveal only a  moderate stenosis of the distal right common carotid artery.      In 3 months to evaluate this further.  We have decided we performed a CTA of the head and neck.  Would not repeat duplex ultrasound but make decisions depending on CTA findings.    #2.  Overall very good risk factor control.  Has lost weight with diet and remains very active.  Non-smoker.  Well-controlled hypertension.  LDL close to goal on present statin.  No diabetes with A1c =5.4.  Thus, very little patient can otherwise do for his carotid disease     Over 25 minutes with patient today including chart review.    Pascual Orozco MD   This note was created using Dragon voice recognition software which may result in transcription errors.

## 2025-05-08 ENCOUNTER — OFFICE VISIT (OUTPATIENT)
Dept: OTHER | Facility: CLINIC | Age: 75
End: 2025-05-08
Attending: SURGERY
Payer: COMMERCIAL

## 2025-05-08 VITALS — HEART RATE: 61 BPM | SYSTOLIC BLOOD PRESSURE: 138 MMHG | DIASTOLIC BLOOD PRESSURE: 62 MMHG

## 2025-05-08 DIAGNOSIS — I65.21 CAROTID STENOSIS, ASYMPTOMATIC, RIGHT: Primary | ICD-10-CM

## 2025-05-08 DIAGNOSIS — E78.5 HYPERLIPIDEMIA LDL GOAL <70: ICD-10-CM

## 2025-05-08 PROCEDURE — G0463 HOSPITAL OUTPT CLINIC VISIT: HCPCS | Performed by: SURGERY

## 2025-05-08 NOTE — PROGRESS NOTES
[facility]        Patient is here for a  follow up.    Pt is currently taking Aspirin and Statin.    /62 (BP Location: Right arm, Patient Position: Chair, Cuff Size: Adult Regular)   Pulse 61     The provider has been notified that the patient has no concerns.     Questions patient would like addressed today are: N/A.    Refills are needed: N/A    Has homecare services and agency name:  Karoline Boss MA

## 2025-05-11 DIAGNOSIS — I10 BENIGN ESSENTIAL HYPERTENSION: ICD-10-CM

## 2025-05-12 RX ORDER — HYDROCHLOROTHIAZIDE 25 MG/1
25 TABLET ORAL
Qty: 90 TABLET | Refills: 0 | Status: SHIPPED | OUTPATIENT
Start: 2025-05-12

## 2025-07-03 ENCOUNTER — NURSE TRIAGE (OUTPATIENT)
Dept: FAMILY MEDICINE | Facility: CLINIC | Age: 75
End: 2025-07-03

## 2025-07-03 ENCOUNTER — OFFICE VISIT (OUTPATIENT)
Dept: FAMILY MEDICINE | Facility: CLINIC | Age: 75
End: 2025-07-03
Payer: COMMERCIAL

## 2025-07-03 VITALS
HEIGHT: 72 IN | HEART RATE: 63 BPM | WEIGHT: 198 LBS | DIASTOLIC BLOOD PRESSURE: 61 MMHG | SYSTOLIC BLOOD PRESSURE: 130 MMHG | OXYGEN SATURATION: 98 % | TEMPERATURE: 97.5 F | RESPIRATION RATE: 16 BRPM | BODY MASS INDEX: 26.82 KG/M2

## 2025-07-03 DIAGNOSIS — A69.20 LYME DISEASE: ICD-10-CM

## 2025-07-03 DIAGNOSIS — R21 RASH: Primary | ICD-10-CM

## 2025-07-03 DIAGNOSIS — B86 SCABIES: ICD-10-CM

## 2025-07-03 DIAGNOSIS — E78.5 HYPERLIPIDEMIA LDL GOAL <70: ICD-10-CM

## 2025-07-03 DIAGNOSIS — I65.22 LEFT CAROTID STENOSIS: ICD-10-CM

## 2025-07-03 RX ORDER — CLONAZEPAM 0.5 MG/1
TABLET ORAL PRN
COMMUNITY
Start: 2025-04-27

## 2025-07-03 RX ORDER — PERMETHRIN 50 MG/G
CREAM TOPICAL
Qty: 60 G | Refills: 1 | Status: SHIPPED | OUTPATIENT
Start: 2025-07-03

## 2025-07-03 RX ORDER — DOXYCYCLINE 100 MG/1
100 TABLET ORAL 2 TIMES DAILY
Qty: 42 TABLET | Refills: 0 | Status: SHIPPED | OUTPATIENT
Start: 2025-07-03

## 2025-07-03 ASSESSMENT — PAIN SCALES - GENERAL: PAINLEVEL_OUTOF10: NO PAIN (0)

## 2025-07-03 NOTE — TELEPHONE ENCOUNTER
"S-(situation): Pt developed rash/welts within last 2 days no itching (red, bullseyes/welts, groin, front and back of thighs). States he was outside using a chainsaw a few days ago, saw tick (thinks it was juvenile wood tick) in \"crotch\" and it came off easily with no issues.     B-(background): Previously had welts in March (thought it may be scabies at that time, Permethrin prescribed).     A-(assessment): Welts have puffed up today, one in groin is tender to the touch    R-(recommendations): Scheduled VV with PCP at 5:30, Pt requesting in person are you able to switch this to IN PERSON?     Heidi Banerjee RN on 7/3/2025 at 10:36 AM      Reason for Disposition    Red or very tender (to touch) area and started over 24 hours after the bite    Additional Information    Negative: Sudden onset of rash (within last 2 hours) and difficulty with breathing or swallowing    Negative: Difficult to awaken or acting confused (e.g., disoriented, slurred speech)    Negative: Fever and purple or blood-colored spots or dots    Negative: Too weak or sick to stand    Negative: Life-threatening reaction (anaphylaxis) in the past to similar substance (e.g., food, insect bite/sting, chemical, etc.) and < 2 hours since exposure    Negative: Sounds like a life-threatening emergency to the triager    Negative: Not a tick bite    Negative: Patient sounds very sick or weak to the triager    Negative: Fever or severe headache occurs, 2 to 14 days following the bite    Negative: Widespread rash occurs, 2 to 14 days following the bite    Negative: Can't remove live tick (after using Care Advice)    Negative: Fever and spreading red area or streak    Negative: Fever and area is very tender to touch    Negative: Red streak or red line and length > 2 inches (5 cm)    Protocols used: Rash or Redness - Widespread-A-OH, Tick Bite-A-OH    "

## 2025-07-03 NOTE — TELEPHONE ENCOUNTER
Called pt and informed to arrive for in person appt at 5:10 pm for 5:30 pm appt.     Heidi Banerjee RN on 7/3/2025 at 12:20 PM

## 2025-07-03 NOTE — TELEPHONE ENCOUNTER
Reason for Call:  Appointment Request    Patient requesting this type of appt:  skin condition flare up     Requested provider: Lam Rodríguez    Reason patient unable to be scheduled: Not within requested timeframe    When does patient want to be seen/preferred time: Same day    Comments: Flare up legs and buttocks after a vacation but had the experience before    Could we send this information to you in [facility] or would you prefer to receive a phone call?:   Patient would prefer a phone call   Okay to leave a detailed message?: Yes at Cell number on file:    Telephone Information:   Mobile [phone]       Call taken on 7/3/2025 at 9:49 AM by Linda Harper   Advance Care Planning     Advance Care Planning Activator (Inpatient)  Conversation Note      Date of ACP Conversation: 12/2/2020    Conversation Conducted with: Patient with Decision Making Capacity   Healthcare Decision Maker: Next of Kin by law (only applies in absence of above) (name) Elan Major Activator: [address] makes decisions on behalf of the incapacitated patient: Decision Maker is asked to consider and make decisions based on patient values, known preferences, or best interests. Health Care Decision Maker: yessenia Panchal    Current Designated Health Care Decision Maker:   (If there is a valid Health Care Decision Maker named in the [address] [facility]\" box in the ACP activity, but it is not visible above, be sure to open that field and then select the health care decision maker relationship (ie \"primary\") in the blank space to the right of the name.) Validate  this information as still accurate & up-to-date; edit [address] field as needed.)    Note: Assess and validate information in current ACP documents, as indicated. If no Decision Maker listed above or available through scanned documents, then:    If no Authorized Decision Maker has previously been identified, then patient chooses [address]:  \"Who would you like to name as your primary health care decision-maker? \"               Name: Luisa Madrigal        Relationship: son          Phone number: [phone]  Nii Peeks this person be reached easily? \" Yes  \"Who would you like to name as your back-up decision maker? \"   Name: Tiana Danielle        Relationship: daughter in law          Phone number: [phone]  Nii Peeks this person be reached easily? \" Yes    Note: If the relationship of these Decision-Makers to the patient does NOT follow your state's Next of Kin hierarchy, recommend that patient complete ACP document that meets state-specific requirements to allow them to act on the patient's behalf when appropriate. Care Preferences    Ventilation: \"If you were in your present state of health and suddenly became very ill and were unable to breathe on your own, what would your preference be about the use of a ventilator (breathing machine) if it were available to you? \"      Would the patient desire the use of ventilator (breathing machine)?: yes    \"If your health worsens and it becomes clear that your chance of recovery is unlikely, what would your preference be about the use of a ventilator (breathing machine) if it were available to you? \"     Would the patient desire the use of ventilator (breathing machine)?: Yes      Resuscitation  \"CPR works best to restart the heart when there is a sudden event, like a heart attack, in someone who is otherwise healthy. Unfortunately, CPR does not typically restart the heart for people who have serious health conditions or who are very sick. \"    \"In the event your heart stopped as a result of an underlying serious health condition, would you want attempts to be made to restart your heart (answer \"yes\" for attempt to resuscitate) or would you prefer a natural death (answer \"no\" for do not attempt to resuscitate)? \" yes      NOTE: If the patient has a valid advance directive AND now provides care preference(s) that are inconsistent with that prior directive, advise the patient to consider either: creating a new advance directive that complies with state-specific requirements; or, if that is not possible, orally revoking that prior directive in accordance with state-specific requirements, which must be documented in the EHR. [] Yes   [x] No   Educated Patient / Kimberlee Proud regarding differences between Advance Directives and portable DNR orders.     Length of ACP Conversation in minutes:      Conversation Outcomes:  [x] ACP discussion completed  [] Existing advance directive reviewed with patient; no changes to patient's previously recorded wishes  [] New Advance Directive completed  [] Portable Do Not Rescitate prepared for Provider review and signature  [] POLST/POST/MOLST/MOST prepared for Provider review and signature      Follow-up plan:    [] Schedule follow-up conversation to continue planning  [x] Referred individual to Provider for additional questions/concerns   [] Advised patient/agent/surrogate to review completed ACP document and update if needed with changes in condition, patient preferences or care setting    [] This note routed to one or more involved healthcare providers

## 2025-07-03 NOTE — PROGRESS NOTES
Assessment & Plan     Rash  Unclear etiology could be scabies or bedbugs could also be a disseminated rash from Lyme disease  Could be cautious, we decided to treat for both as below  Side effects and risks of doxycycline were discussed    Lyme disease    - doxycycline monohydrate (ADOXA) 100 MG tablet; Take 1 tablet (100 mg) by mouth 2 times daily.    Scabies    - permethrin (ELIMITE) 5 % external cream; Apply cream from head to toe (except the face); leave on for 8-14 hours then wash off with water; reapply in 1 week if live mites appear.    Left carotid stenosis  Continue follow-up with vascular surgery as directed    Hyperlipidemia LDL goal <70  I am not certain that he has had his LDL rechecked since increasing atorvastatin from 10 mg to 40 mg.  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    She presents with concerns about a rash    He removed an engorged tick from his scrotum about 5 days ago    Shortly after removing the tick, he noticed a blotchy red rash that extended down both of his legs    The rash was noted to be itchy    Rash was similar to a rash that he had in March that was treated with permethrin and improved    He denies fevers, chills, headache, fatigue or malaise    He also has some questions about his left carotid stenosis    His vascular surgeon plans a CT scan at the 3-month interval to further evaluate    He is taking atorvastatin 40 mg and his last LDL was 90         Objective    /61 (BP Location: Left arm, Patient Position: Sitting, Cuff Size: Adult Regular)   Pulse 63   Temp 97.5  F (36.4  C)   Resp 16   Ht 1.829 m (6')   Wt 89.8 kg (198 lb)   SpO2 98%   BMI 26.85 kg/m    Body mass index is 26.85 kg/m .  Physical Exam   General: This is a well-appearing, comfortable appearing man in no acute distress.  Skin: There is an erythematous macular eruption on the bilateral legs sparing the arms and trunk            Signed Electronically by: Lam Rodríguez MD

## 2025-07-04 ENCOUNTER — RESULTS FOLLOW-UP (OUTPATIENT)
Dept: FAMILY MEDICINE | Facility: CLINIC | Age: 75
End: 2025-07-04

## 2025-08-12 ENCOUNTER — OFFICE VISIT (OUTPATIENT)
Dept: FAMILY MEDICINE | Facility: CLINIC | Age: 75
End: 2025-08-12
Payer: COMMERCIAL

## 2025-08-12 VITALS
TEMPERATURE: 97.7 F | HEART RATE: 66 BPM | SYSTOLIC BLOOD PRESSURE: 125 MMHG | BODY MASS INDEX: 26.18 KG/M2 | HEIGHT: 72 IN | DIASTOLIC BLOOD PRESSURE: 71 MMHG | OXYGEN SATURATION: 99 % | RESPIRATION RATE: 16 BRPM | WEIGHT: 193.3 LBS

## 2025-08-12 DIAGNOSIS — Z00.00 ENCOUNTER FOR MEDICARE ANNUAL WELLNESS EXAM: Primary | ICD-10-CM

## 2025-08-12 DIAGNOSIS — I10 BENIGN ESSENTIAL HYPERTENSION: ICD-10-CM

## 2025-08-12 DIAGNOSIS — E78.5 HYPERLIPIDEMIA LDL GOAL <70: ICD-10-CM

## 2025-08-12 DIAGNOSIS — Z13.1 SCREENING FOR DIABETES MELLITUS: ICD-10-CM

## 2025-08-12 DIAGNOSIS — I65.21 CAROTID STENOSIS, RIGHT: ICD-10-CM

## 2025-08-12 DIAGNOSIS — Z12.5 SCREENING FOR PROSTATE CANCER: ICD-10-CM

## 2025-08-12 DIAGNOSIS — Z85.810 HISTORY OF TONGUE CANCER: ICD-10-CM

## 2025-08-12 PROBLEM — Z71.89 ADVANCED DIRECTIVES, COUNSELING/DISCUSSION: Status: RESOLVED | Noted: 2017-12-18 | Resolved: 2024-06-17

## 2025-08-12 LAB
ERYTHROCYTE [DISTWIDTH] IN BLOOD BY AUTOMATED COUNT: 12.5 % (ref 10–15)
EST. AVERAGE GLUCOSE BLD GHB EST-MCNC: 103 MG/DL
HBA1C MFR BLD: 5.2 % (ref 0–5.6)
HCT VFR BLD AUTO: 49 % (ref 40–53)
HGB BLD-MCNC: 16.9 G/DL (ref 13.3–17.7)
MCH RBC QN AUTO: 30.4 PG (ref 26.5–33)
MCHC RBC AUTO-ENTMCNC: 34.5 G/DL (ref 31.5–36.5)
MCV RBC AUTO: 88.1 FL (ref 78–100)
PLATELET # BLD AUTO: 182 10E3/UL (ref 150–450)
RBC # BLD AUTO: 5.56 10E6/UL (ref 4.4–5.9)
WBC # BLD AUTO: 5.44 10E3/UL (ref 4–11)

## 2025-08-12 PROCEDURE — 36415 COLL VENOUS BLD VENIPUNCTURE: CPT | Performed by: INTERNAL MEDICINE

## 2025-08-12 PROCEDURE — 85027 COMPLETE CBC AUTOMATED: CPT | Performed by: INTERNAL MEDICINE

## 2025-08-12 PROCEDURE — 83036 HEMOGLOBIN GLYCOSYLATED A1C: CPT | Performed by: INTERNAL MEDICINE

## 2025-08-12 SDOH — HEALTH STABILITY: PHYSICAL HEALTH: ON AVERAGE, HOW MANY DAYS PER WEEK DO YOU ENGAGE IN MODERATE TO STRENUOUS EXERCISE (LIKE A BRISK WALK)?: 4 DAYS

## 2025-08-12 SDOH — HEALTH STABILITY: PHYSICAL HEALTH: ON AVERAGE, HOW MANY MINUTES DO YOU ENGAGE IN EXERCISE AT THIS LEVEL?: 20 MIN

## 2025-08-12 ASSESSMENT — SOCIAL DETERMINANTS OF HEALTH (SDOH): HOW OFTEN DO YOU GET TOGETHER WITH FRIENDS OR RELATIVES?: TWICE A WEEK

## 2025-08-12 ASSESSMENT — PAIN SCALES - GENERAL: PAINLEVEL_OUTOF10: MILD PAIN (2)

## 2025-08-13 LAB
ALBUMIN SERPL BCG-MCNC: 4.8 G/DL (ref 3.5–5.2)
ALP SERPL-CCNC: 69 U/L (ref 40–150)
ALT SERPL W P-5'-P-CCNC: 27 U/L (ref 0–70)
ANION GAP SERPL CALCULATED.3IONS-SCNC: 11 MMOL/L (ref 7–15)
AST SERPL W P-5'-P-CCNC: 24 U/L (ref 0–45)
BILIRUB SERPL-MCNC: 1.2 MG/DL
BUN SERPL-MCNC: 13.2 MG/DL (ref 8–23)
CALCIUM SERPL-MCNC: 9.9 MG/DL (ref 8.8–10.4)
CHLORIDE SERPL-SCNC: 98 MMOL/L (ref 98–107)
CHOLEST SERPL-MCNC: 114 MG/DL
CREAT SERPL-MCNC: 0.75 MG/DL (ref 0.67–1.17)
EGFRCR SERPLBLD CKD-EPI 2021: >90 ML/MIN/1.73M2
FASTING STATUS PATIENT QL REPORTED: YES
FASTING STATUS PATIENT QL REPORTED: YES
GLUCOSE SERPL-MCNC: 105 MG/DL (ref 70–99)
HCO3 SERPL-SCNC: 29 MMOL/L (ref 22–29)
HDLC SERPL-MCNC: 38 MG/DL
LDLC SERPL CALC-MCNC: 58 MG/DL
NONHDLC SERPL-MCNC: 76 MG/DL
POTASSIUM SERPL-SCNC: 4.3 MMOL/L (ref 3.4–5.3)
PROT SERPL-MCNC: 6.6 G/DL (ref 6.4–8.3)
PSA SERPL DL<=0.01 NG/ML-MCNC: 2.96 NG/ML (ref 0–6.5)
SODIUM SERPL-SCNC: 138 MMOL/L (ref 135–145)
TRIGL SERPL-MCNC: 92 MG/DL

## 2025-08-19 ENCOUNTER — TELEPHONE (OUTPATIENT)
Dept: OTHER | Facility: CLINIC | Age: 75
End: 2025-08-19
Payer: COMMERCIAL

## 2025-08-20 DIAGNOSIS — I10 BENIGN ESSENTIAL HYPERTENSION: ICD-10-CM

## 2025-08-20 RX ORDER — HYDROCHLOROTHIAZIDE 25 MG/1
25 TABLET ORAL DAILY
Qty: 90 TABLET | Refills: 1 | Status: SHIPPED | OUTPATIENT
Start: 2025-08-20

## (undated) DEVICE — GLOVE PROTEXIS W/NEU-THERA 7.5  2D73TE75

## (undated) DEVICE — DRAPE LAP W/ARMBOARD 29410

## (undated) DEVICE — SYR 30ML LL W/O NDL 302832

## (undated) DEVICE — KIT ENDO TURNOVER/PROCEDURE CARRY-ON 101822

## (undated) DEVICE — LINEN HALF SHEET 5512

## (undated) DEVICE — SU ETHILON 3-0 FS-1 18" 669H

## (undated) DEVICE — PREP CHLORAPREP 26ML TINTED ORANGE  260815

## (undated) DEVICE — ESU GROUND PAD ADULT W/CORD E7507

## (undated) DEVICE — SOL NACL 0.9% IRRIG 3000ML BAG 2B7477

## (undated) DEVICE — SOL NACL 0.9% IRRIG 1000ML BOTTLE 07138-09

## (undated) DEVICE — SU VICRYL 3-0 SH 27" J316H

## (undated) DEVICE — KIT ENDO TURNOVER/PROCEDURE W/CLEAN A SCOPE LINERS 103888

## (undated) DEVICE — Device

## (undated) DEVICE — PACK CYSTO CUSTOM RIDGES

## (undated) DEVICE — DRAPE SHEET REV FOLD 3/4 9349

## (undated) DEVICE — LINEN TOWEL PACK X5 5464

## (undated) DEVICE — DRSG GAUZE 4X4" 3033

## (undated) DEVICE — GLOVE PROTEXIS POWDER FREE SMT 7.5  2D72PT75X

## (undated) DEVICE — SU MONOCRYL 4-0 PS-2 18" UND Y496G

## (undated) DEVICE — SYR 50ML CATH TIP W/O NDL 309620

## (undated) DEVICE — PACK MINOR SBA15MIFSE

## (undated) DEVICE — SYR 10ML LL W/O NDL

## (undated) DEVICE — LINEN FULL SHEET 5511

## (undated) DEVICE — BAG CLEAR TRASH 1.3M 39X33" P4040C

## (undated) DEVICE — TUBING SET IRRIGATION 4 LEAD 90" DYND19124

## (undated) DEVICE — BASIN SET MINOR DISP

## (undated) DEVICE — DRSG STERI STRIP 1/2X4" R1547

## (undated) DEVICE — SU VICRYL 2-0 SH 27" J317H

## (undated) DEVICE — PAD CHUX UNDERPAD 30X36" P3036C

## (undated) DEVICE — ENDO FORCEP ENDOJAW BIOPSY 2.8MMX230CM FB-220U

## (undated) DEVICE — SOL NACL 0.9% IRRIG 1000ML BOTTLE 2F7124

## (undated) DEVICE — ESU ELEC BLADE 2.75" COATED/INSULATED E1455

## (undated) DEVICE — GLOVE PROTEXIS BLUE W/NEU-THERA 7.5  2D73EB75

## (undated) DEVICE — NDL 19GA 1.5"

## (undated) DEVICE — ENDO FORCEP BX CAPTURA JUMBO SPIKE 2.8MMX230CM G53042

## (undated) RX ORDER — CEFAZOLIN SODIUM 2 G/100ML
INJECTION, SOLUTION INTRAVENOUS
Status: DISPENSED
Start: 2021-03-24

## (undated) RX ORDER — PROPOFOL 10 MG/ML
INJECTION, EMULSION INTRAVENOUS
Status: DISPENSED
Start: 2021-03-24

## (undated) RX ORDER — DEXAMETHASONE SODIUM PHOSPHATE 4 MG/ML
INJECTION, SOLUTION INTRA-ARTICULAR; INTRALESIONAL; INTRAMUSCULAR; INTRAVENOUS; SOFT TISSUE
Status: DISPENSED
Start: 2021-03-24

## (undated) RX ORDER — FENTANYL CITRATE 0.05 MG/ML
INJECTION, SOLUTION INTRAMUSCULAR; INTRAVENOUS
Status: DISPENSED
Start: 2022-03-03

## (undated) RX ORDER — CEFAZOLIN SODIUM 2 G/100ML
INJECTION, SOLUTION INTRAVENOUS
Status: DISPENSED
Start: 2017-02-03

## (undated) RX ORDER — OXYCODONE HYDROCHLORIDE 5 MG/1
TABLET ORAL
Status: DISPENSED
Start: 2021-03-24

## (undated) RX ORDER — LIDOCAINE HYDROCHLORIDE 20 MG/ML
INJECTION, SOLUTION EPIDURAL; INFILTRATION; INTRACAUDAL; PERINEURAL
Status: DISPENSED
Start: 2017-02-03

## (undated) RX ORDER — FENTANYL CITRATE 50 UG/ML
INJECTION, SOLUTION INTRAMUSCULAR; INTRAVENOUS
Status: DISPENSED
Start: 2021-03-24

## (undated) RX ORDER — FENTANYL CITRATE 50 UG/ML
INJECTION, SOLUTION INTRAMUSCULAR; INTRAVENOUS
Status: DISPENSED
Start: 2017-03-08

## (undated) RX ORDER — PROPOFOL 10 MG/ML
INJECTION, EMULSION INTRAVENOUS
Status: DISPENSED
Start: 2017-02-03

## (undated) RX ORDER — FENTANYL CITRATE 50 UG/ML
INJECTION, SOLUTION INTRAMUSCULAR; INTRAVENOUS
Status: DISPENSED
Start: 2017-02-03

## (undated) RX ORDER — ONDANSETRON 2 MG/ML
INJECTION INTRAMUSCULAR; INTRAVENOUS
Status: DISPENSED
Start: 2017-02-03

## (undated) RX ORDER — LIDOCAINE HYDROCHLORIDE 10 MG/ML
INJECTION, SOLUTION EPIDURAL; INFILTRATION; INTRACAUDAL; PERINEURAL
Status: DISPENSED
Start: 2021-03-24

## (undated) RX ORDER — GLYCOPYRROLATE 0.2 MG/ML
INJECTION INTRAMUSCULAR; INTRAVENOUS
Status: DISPENSED
Start: 2021-03-24